# Patient Record
Sex: FEMALE | Race: WHITE | Employment: FULL TIME | URBAN - METROPOLITAN AREA
[De-identification: names, ages, dates, MRNs, and addresses within clinical notes are randomized per-mention and may not be internally consistent; named-entity substitution may affect disease eponyms.]

---

## 2017-01-09 ENCOUNTER — HOSPITAL ENCOUNTER (EMERGENCY)
Age: 47
Discharge: HOME | End: 2017-01-09
Attending: EMERGENCY MEDICINE
Payer: COMMERCIAL

## 2017-01-09 VITALS
HEART RATE: 86 BPM | OXYGEN SATURATION: 100 % | SYSTOLIC BLOOD PRESSURE: 143 MMHG | DIASTOLIC BLOOD PRESSURE: 87 MMHG | TEMPERATURE: 98 F | BODY MASS INDEX: 47.09 KG/M2 | RESPIRATION RATE: 16 BRPM | HEIGHT: 66 IN | WEIGHT: 293 LBS

## 2017-01-09 DIAGNOSIS — M43.6 ACUTE MUSCLE STIFFNESS OF NECK: Primary | ICD-10-CM

## 2017-01-09 PROCEDURE — 99283 EMERGENCY DEPT VISIT LOW MDM: CPT

## 2017-01-09 PROCEDURE — 96372 THER/PROPH/DIAG INJ SC/IM: CPT

## 2017-01-09 RX ORDER — LORAZEPAM 0.5 MG/1
0.5 TABLET ORAL EVERY 4 HOURS PRN
COMMUNITY
End: 2017-07-18

## 2017-01-09 RX ORDER — LOSARTAN POTASSIUM 25 MG/1
50 TABLET ORAL DAILY
COMMUNITY
End: 2017-07-08 | Stop reason: ALTCHOICE

## 2017-01-09 RX ORDER — AMLODIPINE BESYLATE 5 MG/1
10 TABLET ORAL DAILY
COMMUNITY
End: 2017-07-08 | Stop reason: ALTCHOICE

## 2017-01-09 RX ORDER — CYCLOBENZAPRINE HCL 10 MG
10 TABLET ORAL 3 TIMES DAILY PRN
Qty: 20 TABLET | Refills: 0 | Status: SHIPPED | OUTPATIENT
Start: 2017-01-09 | End: 2017-01-16

## 2017-01-09 RX ORDER — LORAZEPAM 2 MG/ML
1 INJECTION INTRAMUSCULAR ONCE
Status: COMPLETED | OUTPATIENT
Start: 2017-01-09 | End: 2017-01-09

## 2017-01-09 RX ORDER — ESCITALOPRAM OXALATE 20 MG/1
20 TABLET ORAL DAILY
COMMUNITY
End: 2018-02-22

## 2017-01-09 NOTE — ED PROVIDER NOTES
Patient Seen in: THE [facility] Department In [place]    History   Patient presents with:  Neck Pain (musculoskeletal, neurologic)    Stated Complaint: NECK PA IN    HPI    She is a 71-year-old female coming with neck pain worse on the left side.   Tray Davidson 01/09/17 0510 100 %   O2 Device 01/09/17 0510 None (Room air)       Current:/87 mmHg  Pulse 86  Temp(Src) 97.8 °F (36.6 °C) (Temporal)  Resp 16  Ht 167.6 cm (5' 6\")  Wt 136.079 kg  BMI 48.44 kg/m2  SpO2 100%        Physical Exam   Constitutional: Sh Medication List    START taking these medications    Cyclobenzaprine HCl 10 MG Oral Tab  Take 1 tablet (10 mg total) by mouth 3 (three) times daily as needed for Muscle spasms.   Qty: 20 tablet Refills: 0

## 2017-06-03 ENCOUNTER — HOSPITAL ENCOUNTER (OUTPATIENT)
Dept: CT IMAGING | Facility: HOSPITAL | Age: 47
Discharge: HOME | End: 2017-06-03
Attending: FAMILY MEDICINE

## 2017-06-03 DIAGNOSIS — Z13.9 SCREENING PROCEDURE: ICD-10-CM

## 2017-07-08 ENCOUNTER — OFFICE VISIT (OUTPATIENT)
Dept: FAMILY MEDICINE CLINIC | Facility: CLINIC | Age: 47
End: 2017-07-08

## 2017-07-08 VITALS
TEMPERATURE: 99 F | BODY MASS INDEX: 47.09 KG/M2 | SYSTOLIC BLOOD PRESSURE: 138 MMHG | DIASTOLIC BLOOD PRESSURE: 88 MMHG | RESPIRATION RATE: 72 BRPM | WEIGHT: 293 LBS | HEIGHT: 66 IN | HEART RATE: 72 BPM

## 2017-07-08 DIAGNOSIS — I10 ESSENTIAL HYPERTENSION: ICD-10-CM

## 2017-07-08 DIAGNOSIS — K42.9 UMBILICAL HERNIA WITHOUT OBSTRUCTION AND WITHOUT GANGRENE: Primary | ICD-10-CM

## 2017-07-08 DIAGNOSIS — F32.A ANXIETY AND DEPRESSION: ICD-10-CM

## 2017-07-08 DIAGNOSIS — F41.9 ANXIETY AND DEPRESSION: ICD-10-CM

## 2017-07-08 DIAGNOSIS — E03.9 ACQUIRED HYPOTHYROIDISM: ICD-10-CM

## 2017-07-08 PROCEDURE — 99202 OFFICE O/P NEW SF 15 MIN: CPT | Performed by: NURSE PRACTITIONER

## 2017-07-08 RX ORDER — LOSARTAN POTASSIUM 100 MG/1
1 TABLET ORAL NIGHTLY
COMMUNITY
End: 2017-11-10

## 2017-07-08 RX ORDER — CHLORAL HYDRATE 500 MG
2 CAPSULE ORAL DAILY
COMMUNITY

## 2017-07-08 RX ORDER — AMLODIPINE BESYLATE 10 MG/1
10 TABLET ORAL NIGHTLY
COMMUNITY
End: 2018-02-22

## 2017-07-08 NOTE — PROGRESS NOTES
[facility] Internal Medicine Office Note  Chief Complaint:   Patient presents with:  Establish Care: former PCP was Dr. Adry Bermudez @ [facility]/ [facility]. Hernia: brought in results of CT Scan results from 12/27/2016 @ [facility].     HP Rfl:    LEVOTHYROXINE SODIUM OR Take 100 mcg by mouth. Disp:  Rfl:    escitalopram 20 MG Oral Tab Take 20 mg by mouth daily. Disp:  Rfl:    LORazepam 0.5 MG Oral Tab Take 0.5 mg by mouth every 4 (four) hours as needed for Anxiety.  Disp:  Rfl:          REVI and no guarding. Unable to palpate umbilical hernia, difficult exam r/t to body habitus    Lymphadenopathy:     She has no cervical adenopathy. Neurological: She is alert and oriented to person, place, and time.      ASSESSMENT AND PLAN:   Yanet Sampson

## 2017-07-08 NOTE — PATIENT INSTRUCTIONS
Thank you for choosing [facility] at [place] [address]  To Do: Janiya Bradley  1. Schedule appt with Dr. Saman White for physical exam  2. Schedule appt with general surgery (for hernia eval.) and make sure to bring CT results with you.     Effectiv even beyond those discussed today.  All therapies have potential risk of harm or side effects or medication interactions.  It is your duty and for your safety to discuss with the pharmacist and our office with questions, and to notify us and stop treatment

## 2017-07-13 PROBLEM — Z90.711 HISTORY OF HYSTERECTOMY, SUPRACERVICAL: Status: ACTIVE | Noted: 2017-07-13

## 2017-07-18 ENCOUNTER — OFFICE VISIT (OUTPATIENT)
Dept: FAMILY MEDICINE CLINIC | Facility: CLINIC | Age: 47
End: 2017-07-18

## 2017-07-18 ENCOUNTER — APPOINTMENT (OUTPATIENT)
Dept: LAB | Age: 47
End: 2017-07-18
Attending: INTERNAL MEDICINE
Payer: COMMERCIAL

## 2017-07-18 VITALS
RESPIRATION RATE: 16 BRPM | BODY MASS INDEX: 47.09 KG/M2 | HEIGHT: 66 IN | DIASTOLIC BLOOD PRESSURE: 84 MMHG | WEIGHT: 293 LBS | TEMPERATURE: 98 F | SYSTOLIC BLOOD PRESSURE: 138 MMHG | HEART RATE: 72 BPM

## 2017-07-18 DIAGNOSIS — R74.8 ELEVATED LIVER ENZYMES: ICD-10-CM

## 2017-07-18 DIAGNOSIS — F32.A ANXIETY AND DEPRESSION: ICD-10-CM

## 2017-07-18 DIAGNOSIS — E03.9 HYPOTHYROIDISM, UNSPECIFIED TYPE: ICD-10-CM

## 2017-07-18 DIAGNOSIS — K42.9 UMBILICAL HERNIA WITHOUT OBSTRUCTION AND WITHOUT GANGRENE: ICD-10-CM

## 2017-07-18 DIAGNOSIS — I10 ESSENTIAL HYPERTENSION: Primary | ICD-10-CM

## 2017-07-18 DIAGNOSIS — R91.8 PULMONARY NODULES: ICD-10-CM

## 2017-07-18 DIAGNOSIS — F41.9 ANXIETY AND DEPRESSION: ICD-10-CM

## 2017-07-18 DIAGNOSIS — B37.9 YEAST INFECTION: ICD-10-CM

## 2017-07-18 LAB
ALBUMIN SERPL-MCNC: 3.6 G/DL (ref 3.5–4.8)
ALP LIVER SERPL-CCNC: 88 U/L (ref 37–98)
ALT SERPL-CCNC: 73 U/L (ref 14–54)
AST SERPL-CCNC: 49 U/L (ref 15–41)
BILIRUB DIRECT SERPL-MCNC: <0.1 MG/DL (ref 0.1–0.5)
BILIRUB SERPL-MCNC: 0.3 MG/DL (ref 0.1–2)
FREE T4: 1.1 NG/DL (ref 0.9–1.8)
M PROTEIN MFR SERPL ELPH: 7.6 G/DL (ref 6.1–8.3)
TSI SER-ACNC: 0.76 MIU/ML (ref 0.35–5.5)

## 2017-07-18 PROCEDURE — 84439 ASSAY OF FREE THYROXINE: CPT | Performed by: INTERNAL MEDICINE

## 2017-07-18 PROCEDURE — 80076 HEPATIC FUNCTION PANEL: CPT | Performed by: INTERNAL MEDICINE

## 2017-07-18 PROCEDURE — 99214 OFFICE O/P EST MOD 30 MIN: CPT | Performed by: INTERNAL MEDICINE

## 2017-07-18 PROCEDURE — 36415 COLL VENOUS BLD VENIPUNCTURE: CPT | Performed by: INTERNAL MEDICINE

## 2017-07-18 PROCEDURE — 84443 ASSAY THYROID STIM HORMONE: CPT | Performed by: INTERNAL MEDICINE

## 2017-07-18 RX ORDER — ALPRAZOLAM 0.25 MG/1
0.25 TABLET ORAL 2 TIMES DAILY PRN
Qty: 30 TABLET | Refills: 3 | Status: SHIPPED | OUTPATIENT
Start: 2017-07-18 | End: 2018-02-22

## 2017-07-18 RX ORDER — FLUCONAZOLE 150 MG/1
150 TABLET ORAL ONCE
Qty: 1 TABLET | Refills: 0 | Status: SHIPPED | OUTPATIENT
Start: 2017-07-18 | End: 2017-07-18

## 2017-07-18 NOTE — PROGRESS NOTES
[facility] Internal Medicine Office Note  Chief Complaint:   Patient presents with:  Establish Care  Hernia: has appt with Dr. Hernadez Certain  Lab: previous doctor informed her the liver enzymes were elevated - would like to discuss CT calcium score  Kevin Hysterectomy  10/2013    Ovaries Remain     Family History   Problem Relation Age of Onset   • Heart Disorder Father    • Stroke Father    • COPD [OTHER] Father    • Diabetes Mother      Diet Controlled   • Hypertension Mother    • Thyroid Disorder Mother Psychiatric/Behavioral: Positive for depressed mood. The patient is nervous/anxious. EXAM:   /84   Pulse 72   Temp 97.9 °F (36.6 °C) (Temporal)   Resp 16   Ht 66\"   Wt (!) 305 lb 12.8 oz   BMI 49.36 kg/m²  Estimated body mass index is 49. 3 escitalopram 20mg  -     OP REFERRAL TO [facility]    Umbilical hernia without obstruction and without gangrene - appt scheduled with surgeon     Hypothyroidism, unspecified type - due for thyroid check   -     TSH+FREE T4; Future    Elevated liver enzymes -

## 2017-07-27 ENCOUNTER — OFFICE VISIT (OUTPATIENT)
Dept: SURGERY | Facility: CLINIC | Age: 47
End: 2017-07-27

## 2017-07-27 VITALS
SYSTOLIC BLOOD PRESSURE: 120 MMHG | DIASTOLIC BLOOD PRESSURE: 78 MMHG | HEART RATE: 67 BPM | HEIGHT: 66 IN | WEIGHT: 293 LBS | BODY MASS INDEX: 47.09 KG/M2

## 2017-07-27 DIAGNOSIS — K42.9 UMBILICAL HERNIA WITHOUT OBSTRUCTION AND WITHOUT GANGRENE: Primary | ICD-10-CM

## 2017-07-27 DIAGNOSIS — K43.9 VENTRAL HERNIA WITHOUT OBSTRUCTION OR GANGRENE: ICD-10-CM

## 2017-07-27 PROCEDURE — 99244 OFF/OP CNSLTJ NEW/EST MOD 40: CPT | Performed by: SURGERY

## 2017-07-27 NOTE — H&P
New Patient Visit Note       Active Problems      1. Umbilical hernia without obstruction and without gangrene    2.  Ventral hernia without obstruction or gangrene        Chief Complaint   Patient presents with:  Hernia: New pt ref by Dr. Dane Beard for umbilic The past medical and past surgical history have been reviewed by me today.     Past Medical History:   Diagnosis Date   • Anxiety    • Depression    • Essential hypertension    • Hyperlipidemia    • Hypothyroidism      Past Surgical History:  10/2013: KUSUM omega-3 fatty acids 1000 MG Oral Cap Take 2 capsules by mouth daily. Disp:  Rfl:    LEVOTHYROXINE SODIUM OR Take 100 mcg by mouth. Disp:  Rfl:    escitalopram 20 MG Oral Tab Take 20 mg by mouth daily.  Disp:  Rfl:          Review of Systems  The Review of Bowel sounds are normal. She exhibits no distension. There is no tenderness. There is no rebound and no guarding. A hernia is present. Hernia confirmed positive in the ventral area. Umbilical and ventral hernia, reducible.    Musculoskeletal: Normal r

## 2017-08-28 RX ORDER — HEPARIN SODIUM 5000 [USP'U]/ML
5000 INJECTION, SOLUTION INTRAVENOUS; SUBCUTANEOUS ONCE
Status: CANCELLED | OUTPATIENT
Start: 2017-08-28

## 2017-09-01 ENCOUNTER — APPOINTMENT (OUTPATIENT)
Dept: LAB | Facility: HOSPITAL | Age: 47
End: 2017-09-01
Payer: COMMERCIAL

## 2017-09-01 DIAGNOSIS — K43.9 VENTRAL HERNIA WITHOUT OBSTRUCTION OR GANGRENE: ICD-10-CM

## 2017-09-01 LAB
ATRIAL RATE: 63 BPM
BUN BLD-MCNC: 11 MG/DL (ref 8–20)
CALCIUM BLD-MCNC: 9.2 MG/DL (ref 8.3–10.3)
CHLORIDE: 104 MMOL/L (ref 101–111)
CO2: 30 MMOL/L (ref 22–32)
CREAT BLD-MCNC: 0.81 MG/DL (ref 0.55–1.02)
GLUCOSE BLD-MCNC: 127 MG/DL (ref 70–99)
P AXIS: 42 DEGREES
P-R INTERVAL: 158 MS
POTASSIUM SERPL-SCNC: 4.1 MMOL/L (ref 3.6–5.1)
Q-T INTERVAL: 418 MS
QRS DURATION: 76 MS
QTC CALCULATION (BEZET): 427 MS
R AXIS: 4 DEGREES
SODIUM SERPL-SCNC: 140 MMOL/L (ref 136–144)
T AXIS: 27 DEGREES
VENTRICULAR RATE: 63 BPM

## 2017-09-01 PROCEDURE — 80048 BASIC METABOLIC PNL TOTAL CA: CPT

## 2017-09-01 PROCEDURE — 93010 ELECTROCARDIOGRAM REPORT: CPT | Performed by: INTERNAL MEDICINE

## 2017-09-01 PROCEDURE — 36415 COLL VENOUS BLD VENIPUNCTURE: CPT

## 2017-09-01 PROCEDURE — 93005 ELECTROCARDIOGRAM TRACING: CPT

## 2017-09-22 ENCOUNTER — SURGERY (OUTPATIENT)
Age: 47
End: 2017-09-22

## 2017-09-22 ENCOUNTER — HOSPITAL ENCOUNTER (OUTPATIENT)
Facility: HOSPITAL | Age: 47
Discharge: HOME | End: 2017-09-23
Attending: SURGERY | Admitting: SURGERY
Payer: COMMERCIAL

## 2017-09-22 ENCOUNTER — ANESTHESIA (OUTPATIENT)
Dept: SURGERY | Facility: HOSPITAL | Age: 47
End: 2017-09-22
Payer: COMMERCIAL

## 2017-09-22 ENCOUNTER — ANESTHESIA EVENT (OUTPATIENT)
Dept: SURGERY | Facility: HOSPITAL | Age: 47
End: 2017-09-22
Payer: COMMERCIAL

## 2017-09-22 DIAGNOSIS — K43.9 VENTRAL HERNIA WITHOUT OBSTRUCTION OR GANGRENE: Primary | ICD-10-CM

## 2017-09-22 PROCEDURE — 0WUF0JZ SUPPLEMENT ABDOMINAL WALL WITH SYNTHETIC SUBSTITUTE, OPEN APPROACH: ICD-10-PCS | Performed by: SURGERY

## 2017-09-22 DEVICE — VENTRALEX ST HERNIA PATCH
Type: IMPLANTABLE DEVICE | Site: ABDOMEN | Status: FUNCTIONAL
Brand: VENTRALEX ST HERNIA PATCH

## 2017-09-22 DEVICE — VENTRIO ST HERNIA PATCH
Type: IMPLANTABLE DEVICE | Site: ABDOMEN | Status: FUNCTIONAL
Brand: VENTRIO ST HERNIA PATCH

## 2017-09-22 RX ORDER — ONDANSETRON 2 MG/ML
4 INJECTION INTRAMUSCULAR; INTRAVENOUS EVERY 6 HOURS PRN
Status: DISCONTINUED | OUTPATIENT
Start: 2017-09-22 | End: 2017-09-23

## 2017-09-22 RX ORDER — NALOXONE HYDROCHLORIDE 0.4 MG/ML
80 INJECTION, SOLUTION INTRAMUSCULAR; INTRAVENOUS; SUBCUTANEOUS AS NEEDED
Status: DISCONTINUED | OUTPATIENT
Start: 2017-09-22 | End: 2017-09-22 | Stop reason: HOSPADM

## 2017-09-22 RX ORDER — ALPRAZOLAM 0.25 MG/1
0.25 TABLET ORAL 2 TIMES DAILY PRN
Status: DISCONTINUED | OUTPATIENT
Start: 2017-09-22 | End: 2017-09-23

## 2017-09-22 RX ORDER — HYDROMORPHONE HYDROCHLORIDE 1 MG/ML
INJECTION, SOLUTION INTRAMUSCULAR; INTRAVENOUS; SUBCUTANEOUS
Status: COMPLETED
Start: 2017-09-22 | End: 2017-09-22

## 2017-09-22 RX ORDER — HYDROCODONE BITARTRATE AND ACETAMINOPHEN 5; 325 MG/1; MG/1
1 TABLET ORAL ONCE
Status: COMPLETED | OUTPATIENT
Start: 2017-09-22 | End: 2017-09-22

## 2017-09-22 RX ORDER — AMLODIPINE BESYLATE 5 MG/1
10 TABLET ORAL NIGHTLY
Status: DISCONTINUED | OUTPATIENT
Start: 2017-09-22 | End: 2017-09-23

## 2017-09-22 RX ORDER — HYDROMORPHONE HYDROCHLORIDE 1 MG/ML
1.2 INJECTION, SOLUTION INTRAMUSCULAR; INTRAVENOUS; SUBCUTANEOUS EVERY 2 HOUR PRN
Status: DISCONTINUED | OUTPATIENT
Start: 2017-09-22 | End: 2017-09-23

## 2017-09-22 RX ORDER — HEPARIN SODIUM 5000 [USP'U]/ML
5000 INJECTION, SOLUTION INTRAVENOUS; SUBCUTANEOUS ONCE
Status: COMPLETED | OUTPATIENT
Start: 2017-09-22 | End: 2017-09-22

## 2017-09-22 RX ORDER — HYDROMORPHONE HYDROCHLORIDE 1 MG/ML
0.8 INJECTION, SOLUTION INTRAMUSCULAR; INTRAVENOUS; SUBCUTANEOUS EVERY 2 HOUR PRN
Status: DISCONTINUED | OUTPATIENT
Start: 2017-09-22 | End: 2017-09-23

## 2017-09-22 RX ORDER — BACITRACIN 50000 [USP'U]/1
INJECTION, POWDER, LYOPHILIZED, FOR SOLUTION INTRAMUSCULAR AS NEEDED
Status: DISCONTINUED | OUTPATIENT
Start: 2017-09-22 | End: 2017-09-22 | Stop reason: HOSPADM

## 2017-09-22 RX ORDER — IBUPROFEN 400 MG/1
400 TABLET ORAL EVERY 4 HOURS PRN
Status: DISCONTINUED | OUTPATIENT
Start: 2017-09-22 | End: 2017-09-23

## 2017-09-22 RX ORDER — IBUPROFEN 600 MG/1
600 TABLET ORAL EVERY 4 HOURS PRN
Status: DISCONTINUED | OUTPATIENT
Start: 2017-09-22 | End: 2017-09-23

## 2017-09-22 RX ORDER — SODIUM CHLORIDE, SODIUM LACTATE, POTASSIUM CHLORIDE, CALCIUM CHLORIDE 600; 310; 30; 20 MG/100ML; MG/100ML; MG/100ML; MG/100ML
INJECTION, SOLUTION INTRAVENOUS CONTINUOUS
Status: DISCONTINUED | OUTPATIENT
Start: 2017-09-22 | End: 2017-09-22

## 2017-09-22 RX ORDER — HYDROCODONE BITARTRATE AND ACETAMINOPHEN 5; 325 MG/1; MG/1
1 TABLET ORAL EVERY 4 HOURS PRN
Status: DISCONTINUED | OUTPATIENT
Start: 2017-09-22 | End: 2017-09-23

## 2017-09-22 RX ORDER — AMLODIPINE BESYLATE 5 MG/1
10 TABLET ORAL DAILY
Status: DISCONTINUED | OUTPATIENT
Start: 2017-09-22 | End: 2017-09-22

## 2017-09-22 RX ORDER — HYDROCODONE BITARTRATE AND ACETAMINOPHEN 5; 325 MG/1; MG/1
2 TABLET ORAL EVERY 4 HOURS PRN
Status: DISCONTINUED | OUTPATIENT
Start: 2017-09-22 | End: 2017-09-23

## 2017-09-22 RX ORDER — LOSARTAN POTASSIUM 100 MG/1
100 TABLET ORAL DAILY
Status: DISCONTINUED | OUTPATIENT
Start: 2017-09-22 | End: 2017-09-22

## 2017-09-22 RX ORDER — ESCITALOPRAM OXALATE 20 MG/1
20 TABLET ORAL DAILY
Status: DISCONTINUED | OUTPATIENT
Start: 2017-09-23 | End: 2017-09-23

## 2017-09-22 RX ORDER — METOCLOPRAMIDE HYDROCHLORIDE 5 MG/ML
10 INJECTION INTRAMUSCULAR; INTRAVENOUS EVERY 6 HOURS PRN
Status: DISCONTINUED | OUTPATIENT
Start: 2017-09-22 | End: 2017-09-23

## 2017-09-22 RX ORDER — HYDROCODONE BITARTRATE AND ACETAMINOPHEN 5; 325 MG/1; MG/1
TABLET ORAL
Qty: 30 TABLET | Refills: 0 | Status: SHIPPED | OUTPATIENT
Start: 2017-09-22 | End: 2017-10-05 | Stop reason: ALTCHOICE

## 2017-09-22 RX ORDER — IBUPROFEN 200 MG
200 TABLET ORAL EVERY 4 HOURS PRN
Status: DISCONTINUED | OUTPATIENT
Start: 2017-09-22 | End: 2017-09-23

## 2017-09-22 RX ORDER — HYDROMORPHONE HYDROCHLORIDE 1 MG/ML
0.4 INJECTION, SOLUTION INTRAMUSCULAR; INTRAVENOUS; SUBCUTANEOUS EVERY 2 HOUR PRN
Status: DISCONTINUED | OUTPATIENT
Start: 2017-09-22 | End: 2017-09-23

## 2017-09-22 RX ORDER — HYDROCODONE BITARTRATE AND ACETAMINOPHEN 5; 325 MG/1; MG/1
2 TABLET ORAL ONCE
Status: COMPLETED | OUTPATIENT
Start: 2017-09-22 | End: 2017-09-22

## 2017-09-22 RX ORDER — MAGNESIUM HYDROXIDE 1200 MG/15ML
LIQUID ORAL CONTINUOUS PRN
Status: DISCONTINUED | OUTPATIENT
Start: 2017-09-22 | End: 2017-09-22

## 2017-09-22 RX ORDER — SODIUM CHLORIDE, SODIUM LACTATE, POTASSIUM CHLORIDE, CALCIUM CHLORIDE 600; 310; 30; 20 MG/100ML; MG/100ML; MG/100ML; MG/100ML
INJECTION, SOLUTION INTRAVENOUS CONTINUOUS
Status: DISCONTINUED | OUTPATIENT
Start: 2017-09-22 | End: 2017-09-23

## 2017-09-22 RX ORDER — CEFADROXIL 500 MG/1
500 CAPSULE ORAL 2 TIMES DAILY
Qty: 20 CAPSULE | Refills: 0 | Status: SHIPPED | OUTPATIENT
Start: 2017-09-22 | End: 2017-10-02

## 2017-09-22 RX ORDER — LEVOTHYROXINE SODIUM 0.1 MG/1
100 TABLET ORAL
Status: DISCONTINUED | OUTPATIENT
Start: 2017-09-22 | End: 2017-09-23

## 2017-09-22 RX ORDER — LOSARTAN POTASSIUM 100 MG/1
100 TABLET ORAL NIGHTLY
Status: DISCONTINUED | OUTPATIENT
Start: 2017-09-22 | End: 2017-09-23

## 2017-09-22 RX ORDER — BUPIVACAINE HYDROCHLORIDE AND EPINEPHRINE 5; 5 MG/ML; UG/ML
INJECTION, SOLUTION EPIDURAL; INTRACAUDAL; PERINEURAL AS NEEDED
Status: DISCONTINUED | OUTPATIENT
Start: 2017-09-22 | End: 2017-09-22 | Stop reason: HOSPADM

## 2017-09-22 RX ORDER — HYDROMORPHONE HYDROCHLORIDE 1 MG/ML
0.4 INJECTION, SOLUTION INTRAMUSCULAR; INTRAVENOUS; SUBCUTANEOUS EVERY 5 MIN PRN
Status: DISCONTINUED | OUTPATIENT
Start: 2017-09-22 | End: 2017-09-22 | Stop reason: HOSPADM

## 2017-09-22 NOTE — H&P
History & Physical Examination    Patient Name: Damián Holden  MRN: [id number]  CSN: [id number]  YOB: 1970    Diagnosis: Ventral incisional hernia ×2    Present Illness:  The patient is a pleasant 43-year-old female who presents in consult Allergies: No Known Allergies    Past Medical History:   Diagnosis Date   • Anxiety    • Depression    • Essential hypertension    • Hyperlipidemia    • Hypothyroidism      Past Surgical History:  No date: CARPAL TUNNEL RELEASE Bilateral  No date: CO

## 2017-09-22 NOTE — OR NURSING
3319-Patient received from PACU.   Transferred to recChanning Homer  Pt unable to keep O2 Sat >92%-O2 applied  1700-O2 removed to test if patient could keep O2>92% and she could not  Anesthesia notified and he stated that patient would perhaps be admitted because of

## 2017-09-22 NOTE — ANESTHESIA PREPROCEDURE EVALUATION
PRE-OP EVALUATION    Patient Name: Maryam Foil    Pre-op Diagnosis: Ventral hernia without obstruction or gangrene [K43.9]    Procedure(s):  VENTRAL HERNIA REPAIR WITH MESH-COMPLEX X2 ,MODERATE X2      Surgeon(s) and Role:     Fabio Camacho, 140 09/01/2017   K 4.1 09/01/2017    09/01/2017   CO2 30.0 09/01/2017   BUN 11 09/01/2017   CREATSERUM 0.81 09/01/2017    (H) 09/01/2017   CA 9.2 09/01/2017            Airway      Mallampati: II  Mouth opening: 3 FB  TM distance: 4 - 6 cm  Nec

## 2017-09-22 NOTE — ANESTHESIA POSTPROCEDURE EVALUATION
[address] Patient Status:  Hospital Outpatient Surgery   Age/Gender 56year old female MRN [id number]   [facility] SURGERY Attending Alvarado Berumen MD   [facility] Day # 0 PCP Jonnathan Bonner MD       Anesthesia Post-

## 2017-09-22 NOTE — OPERATIVE REPORT
PREOPERATIVE DIAGNOSIS:  Incisional ventral hernia x 2.    POSTOPERATIVE DIAGNOSIS:  Incisional ventral hernia x 2.    PROCEDURE PERFORMED: Repair of incarcerated ventral hernia with mesh x 2  Symone Webster MD  ASSISTANT:  Bebo Paz fascial edges were grasped. Subcutaneous flaps were created. The hernia contents were reduced back into the abdomen. This hernia defect measured approximately 3\" x 1.5\".   A Ventrio mesh measuring approximately 4\" x 3\" was passed on the field.  It wa

## 2017-09-23 VITALS
DIASTOLIC BLOOD PRESSURE: 69 MMHG | RESPIRATION RATE: 20 BRPM | HEART RATE: 60 BPM | OXYGEN SATURATION: 99 % | HEIGHT: 66 IN | TEMPERATURE: 98 F | BODY MASS INDEX: 46.12 KG/M2 | WEIGHT: 287 LBS | SYSTOLIC BLOOD PRESSURE: 115 MMHG

## 2017-09-23 NOTE — PROGRESS NOTES
NURSING DISCHARGE NOTE    Discharged Home via Wheelchair. Accompanied by Family member and Spouse  Belongings Taken by patient/family. PATIENT DISCHARGED HOME IN STABLE CONDITION.  PATIENT LEFT FLOOR PER WHEELCHAIR AND WAS ACCOMPANIED BY  AND

## 2017-09-23 NOTE — PLAN OF CARE
DISCHARGE PLANNING    • Discharge to home or other facility with appropriate resources Completed        GASTROINTESTINAL - ADULT    • Minimal or absence of nausea and vomiting Completed    • Maintains or returns to baseline bowel function Completed    • Ma

## 2017-09-23 NOTE — PROGRESS NOTES
[facility]  Progress Note    Scott Barron Patient Status:  Outpatient in a Bed    1970 MRN [id number]   [facility] 3NW-A Attending Angel Lopez MD   [facility] Day # 0 PCP Nicole Mcgarry MD     Subjective:  Pt feeling good.

## 2017-09-28 ENCOUNTER — OFFICE VISIT (OUTPATIENT)
Dept: SURGERY | Facility: CLINIC | Age: 47
End: 2017-09-28

## 2017-09-28 VITALS
HEIGHT: 66 IN | WEIGHT: 293 LBS | HEART RATE: 61 BPM | DIASTOLIC BLOOD PRESSURE: 69 MMHG | TEMPERATURE: 98 F | SYSTOLIC BLOOD PRESSURE: 109 MMHG | BODY MASS INDEX: 47.09 KG/M2

## 2017-09-28 DIAGNOSIS — K43.9 VENTRAL HERNIA WITHOUT OBSTRUCTION OR GANGRENE: Primary | ICD-10-CM

## 2017-09-28 PROCEDURE — 99024 POSTOP FOLLOW-UP VISIT: CPT | Performed by: PHYSICIAN ASSISTANT

## 2017-09-28 RX ORDER — FLUCONAZOLE 200 MG/1
200 TABLET ORAL DAILY
Qty: 3 TABLET | Refills: 0 | Status: SHIPPED | OUTPATIENT
Start: 2017-09-28 | End: 2017-10-01

## 2017-09-28 NOTE — PROGRESS NOTES
Post Operative Visit Note       Active Problems  1.  Ventral hernia without obstruction or gangrene         Chief Complaint   Patient presents with:  Post-Op: 9/22 Repair of incarcerated ventral hernia with mesh x 2, off Norco, WILIAN Hunter-P drain date: TONSILLECTOMY    The family history and social history have been reviewed by me today.     Family History   Problem Relation Age of Onset   • Heart Disorder Father    • Stroke Father    • COPD [OTHER] Father    • Diabetes Mother      Diet Controlled nightly. Disp:  Rfl:    LEVOTHYROXINE SODIUM OR Take 100 mcg by mouth. Disp:  Rfl:    escitalopram 20 MG Oral Tab Take 20 mg by mouth daily.  Disp:  Rfl:    HYDROcodone-acetaminophen (NORCO) 5-325 MG Oral Tab 1-2 tablets by mouth every 4-6 hours as needed tenderness. There is no rebound and no guarding. No hernia. The patient's abdomen is soft, nontender, nondistended, with normoactive bowel sounds. Her midline incision is clean, dry, intact, without erythema or drainage.   Her surgical staples are in [place], [place]

## 2017-10-05 ENCOUNTER — OFFICE VISIT (OUTPATIENT)
Dept: SURGERY | Facility: CLINIC | Age: 47
End: 2017-10-05

## 2017-10-05 VITALS
RESPIRATION RATE: 18 BRPM | DIASTOLIC BLOOD PRESSURE: 77 MMHG | BODY MASS INDEX: 47.09 KG/M2 | HEIGHT: 66 IN | WEIGHT: 293 LBS | SYSTOLIC BLOOD PRESSURE: 118 MMHG | TEMPERATURE: 98 F | HEART RATE: 67 BPM

## 2017-10-05 DIAGNOSIS — K43.9 VENTRAL HERNIA WITHOUT OBSTRUCTION OR GANGRENE: Primary | ICD-10-CM

## 2017-10-05 PROCEDURE — 99024 POSTOP FOLLOW-UP VISIT: CPT | Performed by: PHYSICIAN ASSISTANT

## 2017-10-05 NOTE — PROGRESS NOTES
Post Operative Visit Note       Active Problems  1. Ventral hernia without obstruction or gangrene         Chief Complaint   Patient presents with:  Post-Op: 2nd p/o hernia repair- need staples removed.  PT thinks that she still has a yeast infection but de • Hypertension Mother    • Thyroid Disorder Mother    • Cancer Mother      Thyroid & Skin   • Cancer Maternal Grandmother      Liver   • Heart Disorder Maternal Grandfather    • Stroke Maternal Grandfather    • Diabetes Paternal Grandmother    • Heart At loss, nosebleeds, sore throat and trouble swallowing. Respiratory: Negative for apnea, cough, shortness of breath and wheezing. Cardiovascular: Negative for chest pain, palpitations and leg swelling.    Gastrointestinal: Negative for abdominal distent sterilely without complication. She tolerated this well. No further bandages or dressings were required. I discussed with the patient she may continue to shower like normal allowing soap and water to run over the incision.   She should avoid soaking in a

## 2017-11-10 RX ORDER — LOSARTAN POTASSIUM 100 MG/1
100 TABLET ORAL NIGHTLY
Qty: 90 TABLET | Refills: 1 | Status: SHIPPED | OUTPATIENT
Start: 2017-11-10 | End: 2018-02-22

## 2017-11-10 NOTE — TELEPHONE ENCOUNTER
Requesting Losartan 100mg daily  LOV: 7/18/17  RTC: 6 months  Last Relevant Labs: 9/1/17  Filled: last filled by old PCP    No future appointments. Passed protocol - med approved.

## 2018-01-11 ENCOUNTER — HOSPITAL ENCOUNTER (OUTPATIENT)
Dept: OTHER | Facility: HOSPITAL | Age: 48
Setting detail: THERAPIES SERIES
Discharge: HOME | End: 2018-01-11
Attending: INTERNAL MEDICINE

## 2018-01-11 VITALS
BODY MASS INDEX: 47.09 KG/M2 | DIASTOLIC BLOOD PRESSURE: 80 MMHG | WEIGHT: 293 LBS | SYSTOLIC BLOOD PRESSURE: 114 MMHG | HEIGHT: 66 IN | HEART RATE: 72 BPM

## 2018-01-11 NOTE — PROGRESS NOTES
Patient seen for Phase 1 Lifestyle Under Construction this am. Start date is 02/06/18. Her initial measurements were done and documented on flow sheet. Fasting Cholesterol and Blood sugar complete.  Patient has seen Alanis Cole the dietician for initial evaluation

## 2018-01-16 NOTE — ADDENDUM NOTE
Encounter addended by: Danie Salas on: 1/16/2018 11:56 AM<BR>    Actions taken: Charge Capture section accepted

## 2018-02-06 ENCOUNTER — HOSPITAL ENCOUNTER (OUTPATIENT)
Dept: OTHER | Facility: HOSPITAL | Age: 48
Setting detail: THERAPIES SERIES
Discharge: HOME | End: 2018-02-06
Attending: INTERNAL MEDICINE

## 2018-02-13 ENCOUNTER — HOSPITAL ENCOUNTER (OUTPATIENT)
Dept: OTHER | Facility: HOSPITAL | Age: 48
Setting detail: THERAPIES SERIES
Discharge: HOME | End: 2018-02-13
Attending: INTERNAL MEDICINE

## 2018-02-15 VITALS
WEIGHT: 293 LBS | HEART RATE: 82 BPM | DIASTOLIC BLOOD PRESSURE: 80 MMHG | SYSTOLIC BLOOD PRESSURE: 126 MMHG | BODY MASS INDEX: 49 KG/M2

## 2018-02-20 ENCOUNTER — HOSPITAL ENCOUNTER (OUTPATIENT)
Dept: OTHER | Facility: HOSPITAL | Age: 48
Setting detail: THERAPIES SERIES
Discharge: HOME | End: 2018-02-20
Attending: INTERNAL MEDICINE

## 2018-02-22 ENCOUNTER — OFFICE VISIT (OUTPATIENT)
Dept: INTERNAL MEDICINE CLINIC | Facility: CLINIC | Age: 48
End: 2018-02-22

## 2018-02-22 ENCOUNTER — LAB ENCOUNTER (OUTPATIENT)
Dept: LAB | Age: 48
End: 2018-02-22
Attending: INTERNAL MEDICINE
Payer: COMMERCIAL

## 2018-02-22 VITALS
BODY MASS INDEX: 47.09 KG/M2 | HEIGHT: 66 IN | DIASTOLIC BLOOD PRESSURE: 78 MMHG | WEIGHT: 293 LBS | RESPIRATION RATE: 16 BRPM | SYSTOLIC BLOOD PRESSURE: 128 MMHG | HEART RATE: 78 BPM

## 2018-02-22 DIAGNOSIS — E03.9 ACQUIRED HYPOTHYROIDISM: ICD-10-CM

## 2018-02-22 DIAGNOSIS — Z00.00 WELLNESS EXAMINATION: Primary | ICD-10-CM

## 2018-02-22 DIAGNOSIS — F32.A ANXIETY AND DEPRESSION: ICD-10-CM

## 2018-02-22 DIAGNOSIS — Z00.00 LABORATORY EXAM ORDERED AS PART OF ROUTINE GENERAL MEDICAL EXAMINATION: ICD-10-CM

## 2018-02-22 DIAGNOSIS — F41.9 ANXIETY AND DEPRESSION: ICD-10-CM

## 2018-02-22 DIAGNOSIS — I10 ESSENTIAL HYPERTENSION: ICD-10-CM

## 2018-02-22 LAB
ALBUMIN SERPL-MCNC: 4 G/DL (ref 3.5–4.8)
ALP LIVER SERPL-CCNC: 81 U/L (ref 39–100)
ALT SERPL-CCNC: 82 U/L (ref 14–54)
AST SERPL-CCNC: 61 U/L (ref 15–41)
BASOPHILS # BLD AUTO: 0.05 X10(3) UL (ref 0–0.1)
BASOPHILS NFR BLD AUTO: 0.5 %
BILIRUB SERPL-MCNC: 0.5 MG/DL (ref 0.1–2)
BUN BLD-MCNC: 12 MG/DL (ref 8–20)
CALCIUM BLD-MCNC: 9.4 MG/DL (ref 8.3–10.3)
CHLORIDE: 105 MMOL/L (ref 101–111)
CHOLEST SMN-MCNC: 177 MG/DL (ref ?–200)
CO2: 26 MMOL/L (ref 22–32)
CREAT BLD-MCNC: 0.75 MG/DL (ref 0.55–1.02)
EOSINOPHIL # BLD AUTO: 0.15 X10(3) UL (ref 0–0.3)
EOSINOPHIL NFR BLD AUTO: 1.6 %
ERYTHROCYTE [DISTWIDTH] IN BLOOD BY AUTOMATED COUNT: 13 % (ref 11.5–16)
FREE T4: 1.3 NG/DL (ref 0.9–1.8)
GLUCOSE BLD-MCNC: 95 MG/DL (ref 70–99)
HCT VFR BLD AUTO: 45.8 % (ref 34–50)
HDLC SERPL-MCNC: 37 MG/DL (ref 45–?)
HDLC SERPL: 4.78 {RATIO} (ref ?–4.44)
HGB BLD-MCNC: 15 G/DL (ref 12–16)
IMMATURE GRANULOCYTE COUNT: 0.02 X10(3) UL (ref 0–1)
IMMATURE GRANULOCYTE RATIO %: 0.2 %
LDLC SERPL CALC-MCNC: 109 MG/DL (ref ?–130)
LYMPHOCYTES # BLD AUTO: 3.04 X10(3) UL (ref 0.9–4)
LYMPHOCYTES NFR BLD AUTO: 33.1 %
M PROTEIN MFR SERPL ELPH: 8.1 G/DL (ref 6.1–8.3)
MCH RBC QN AUTO: 30.9 PG (ref 27–33.2)
MCHC RBC AUTO-ENTMCNC: 32.8 G/DL (ref 31–37)
MCV RBC AUTO: 94.4 FL (ref 81–100)
MONOCYTES # BLD AUTO: 0.68 X10(3) UL (ref 0.1–1)
MONOCYTES NFR BLD AUTO: 7.4 %
NEUTROPHIL ABS PRELIM: 5.25 X10 (3) UL (ref 1.3–6.7)
NEUTROPHILS # BLD AUTO: 5.25 X10(3) UL (ref 1.3–6.7)
NEUTROPHILS NFR BLD AUTO: 57.2 %
NONHDLC SERPL-MCNC: 140 MG/DL (ref ?–130)
PLATELET # BLD AUTO: 276 10(3)UL (ref 150–450)
POTASSIUM SERPL-SCNC: 4.3 MMOL/L (ref 3.6–5.1)
RBC # BLD AUTO: 4.85 X10(6)UL (ref 3.8–5.1)
RED CELL DISTRIBUTION WIDTH-SD: 44.9 FL (ref 35.1–46.3)
SODIUM SERPL-SCNC: 139 MMOL/L (ref 136–144)
TRIGL SERPL-MCNC: 154 MG/DL (ref ?–150)
TSI SER-ACNC: 0.76 MIU/ML (ref 0.35–5.5)
VLDLC SERPL CALC-MCNC: 31 MG/DL (ref 5–40)
WBC # BLD AUTO: 9.2 X10(3) UL (ref 4–13)

## 2018-02-22 PROCEDURE — 90471 IMMUNIZATION ADMIN: CPT | Performed by: INTERNAL MEDICINE

## 2018-02-22 PROCEDURE — 36415 COLL VENOUS BLD VENIPUNCTURE: CPT | Performed by: INTERNAL MEDICINE

## 2018-02-22 PROCEDURE — 80050 GENERAL HEALTH PANEL: CPT | Performed by: INTERNAL MEDICINE

## 2018-02-22 PROCEDURE — 80061 LIPID PANEL: CPT | Performed by: INTERNAL MEDICINE

## 2018-02-22 PROCEDURE — 90715 TDAP VACCINE 7 YRS/> IM: CPT | Performed by: INTERNAL MEDICINE

## 2018-02-22 PROCEDURE — 84439 ASSAY OF FREE THYROXINE: CPT | Performed by: INTERNAL MEDICINE

## 2018-02-22 PROCEDURE — 99396 PREV VISIT EST AGE 40-64: CPT | Performed by: INTERNAL MEDICINE

## 2018-02-22 RX ORDER — ALPRAZOLAM 0.25 MG/1
0.25 TABLET ORAL 2 TIMES DAILY PRN
Qty: 30 TABLET | Refills: 5 | Status: SHIPPED | OUTPATIENT
Start: 2018-02-22 | End: 2019-09-17

## 2018-02-22 RX ORDER — LEVOTHYROXINE SODIUM 0.1 MG/1
100 TABLET ORAL
Qty: 90 TABLET | Refills: 3 | Status: SHIPPED | OUTPATIENT
Start: 2018-02-22 | End: 2019-02-25

## 2018-02-22 RX ORDER — LEVOTHYROXINE SODIUM 0.1 MG/1
100 TABLET ORAL
Refills: 2 | COMMUNITY
Start: 2018-02-03 | End: 2018-02-22

## 2018-02-22 RX ORDER — ESCITALOPRAM OXALATE 20 MG/1
20 TABLET ORAL DAILY
Qty: 90 TABLET | Refills: 3 | Status: SHIPPED | OUTPATIENT
Start: 2018-02-22 | End: 2019-02-25

## 2018-02-22 RX ORDER — LOSARTAN POTASSIUM 100 MG/1
100 TABLET ORAL NIGHTLY
Qty: 90 TABLET | Refills: 3 | Status: SHIPPED | OUTPATIENT
Start: 2018-02-22 | End: 2019-02-25

## 2018-02-22 RX ORDER — AMLODIPINE BESYLATE 10 MG/1
10 TABLET ORAL NIGHTLY
Qty: 90 TABLET | Refills: 3 | Status: SHIPPED | OUTPATIENT
Start: 2018-02-22 | End: 2019-03-09

## 2018-02-22 NOTE — PROGRESS NOTES
[facility] Internal Medicine Office Note  Chief Complaint:   Patient presents with:  Physical      HPI:   This is a 56year old female coming in for physical  HPI  HTN; blood pressure has been well controlled    She enrolled in a wellness program shows  Smoking status: Never Smoker                                                              Smokeless tobacco: Never Used                      Alcohol use:  Yes              Comment: RARE    Allergies:  No Known Allergies    Current Outpatient Prescrip posterior oropharyngeal erythema. Eyes: Conjunctivae are normal.   Neck: Neck supple. Cardiovascular: Normal rate, regular rhythm and normal heart sounds. Pulmonary/Chest: Effort normal and breath sounds normal.   Abdominal: Soft.  There is no tender [E]      Comp Metabolic Panel (14) [E]      Lipid Panel [E]      TSH and Free T4 [E]      TdaP (Adacel, Boostrix) (35787) (DX V06.1/Z23)    Meds & Refills for this Visit:  Signed Prescriptions Disp Refills    Levothyroxine Sodium 100 MCG Oral Tab 90 tablet

## 2018-02-26 ENCOUNTER — TELEPHONE (OUTPATIENT)
Dept: INTERNAL MEDICINE CLINIC | Facility: CLINIC | Age: 48
End: 2018-02-26

## 2018-02-26 DIAGNOSIS — R74.8 ELEVATED LIVER ENZYMES: Primary | ICD-10-CM

## 2018-02-26 NOTE — TELEPHONE ENCOUNTER
Pt stated that she was to hold off on sending in refill on levothyroxine, losartan and amlodipine until lab results came back. Would like to know if she can send in refills. Please advise. TY!

## 2018-02-27 ENCOUNTER — HOSPITAL ENCOUNTER (OUTPATIENT)
Dept: OTHER | Facility: HOSPITAL | Age: 48
Setting detail: THERAPIES SERIES
Discharge: HOME | End: 2018-02-27
Attending: INTERNAL MEDICINE

## 2018-02-27 ENCOUNTER — APPOINTMENT (OUTPATIENT)
Dept: LAB | Facility: HOSPITAL | Age: 48
End: 2018-02-27
Attending: INTERNAL MEDICINE
Payer: COMMERCIAL

## 2018-02-27 DIAGNOSIS — R74.8 ELEVATED LIVER ENZYMES: ICD-10-CM

## 2018-02-27 LAB
HBV SURFACE AB SER QL: NONREACTIVE
HBV SURFACE AB SERPL IA-ACNC: 3.41 MIU/ML
HBV SURFACE AG SERPL QL IA: NONREACTIVE
HEPATITIS B SURFACE ANTIGEN INDEX: <0.1
HEPATITIS C VIRUS AB INTERPRETATION: NONREACTIVE
TRANSFERRIN: 212 MG/DL (ref 200–360)

## 2018-02-27 PROCEDURE — 87340 HEPATITIS B SURFACE AG IA: CPT

## 2018-02-27 PROCEDURE — 84466 ASSAY OF TRANSFERRIN: CPT

## 2018-02-27 PROCEDURE — 36415 COLL VENOUS BLD VENIPUNCTURE: CPT

## 2018-02-27 PROCEDURE — 86803 HEPATITIS C AB TEST: CPT

## 2018-02-27 PROCEDURE — 86706 HEP B SURFACE ANTIBODY: CPT

## 2018-02-27 NOTE — TELEPHONE ENCOUNTER
Results were discussed with her already per result note.   Ok to refill levothyroxine and other meds

## 2018-03-01 VITALS
DIASTOLIC BLOOD PRESSURE: 70 MMHG | BODY MASS INDEX: 49 KG/M2 | WEIGHT: 293 LBS | SYSTOLIC BLOOD PRESSURE: 128 MMHG | HEART RATE: 77 BPM

## 2018-03-01 VITALS
SYSTOLIC BLOOD PRESSURE: 122 MMHG | WEIGHT: 293 LBS | DIASTOLIC BLOOD PRESSURE: 85 MMHG | DIASTOLIC BLOOD PRESSURE: 80 MMHG | SYSTOLIC BLOOD PRESSURE: 120 MMHG | HEART RATE: 78 BPM | HEART RATE: 82 BPM | BODY MASS INDEX: 48 KG/M2

## 2018-03-06 ENCOUNTER — HOSPITAL ENCOUNTER (OUTPATIENT)
Dept: OTHER | Facility: HOSPITAL | Age: 48
Setting detail: THERAPIES SERIES
Discharge: HOME | End: 2018-03-06
Attending: INTERNAL MEDICINE

## 2018-03-08 VITALS
SYSTOLIC BLOOD PRESSURE: 128 MMHG | HEART RATE: 84 BPM | DIASTOLIC BLOOD PRESSURE: 74 MMHG | BODY MASS INDEX: 48 KG/M2 | WEIGHT: 293 LBS

## 2018-03-13 ENCOUNTER — HOSPITAL ENCOUNTER (OUTPATIENT)
Dept: OTHER | Facility: HOSPITAL | Age: 48
Setting detail: THERAPIES SERIES
Discharge: HOME | End: 2018-03-13
Attending: INTERNAL MEDICINE

## 2018-03-20 ENCOUNTER — HOSPITAL ENCOUNTER (OUTPATIENT)
Dept: OTHER | Facility: HOSPITAL | Age: 48
Setting detail: THERAPIES SERIES
Discharge: HOME | End: 2018-03-20
Attending: INTERNAL MEDICINE

## 2018-03-22 VITALS — WEIGHT: 292.5 LBS | BODY MASS INDEX: 47 KG/M2

## 2018-03-22 VITALS — BODY MASS INDEX: 48 KG/M2 | WEIGHT: 293 LBS

## 2018-03-27 ENCOUNTER — HOSPITAL ENCOUNTER (OUTPATIENT)
Dept: OTHER | Facility: HOSPITAL | Age: 48
Setting detail: THERAPIES SERIES
Discharge: HOME | End: 2018-03-27
Attending: INTERNAL MEDICINE

## 2018-03-29 VITALS
DIASTOLIC BLOOD PRESSURE: 70 MMHG | HEART RATE: 72 BPM | BODY MASS INDEX: 47 KG/M2 | SYSTOLIC BLOOD PRESSURE: 122 MMHG | WEIGHT: 292.13 LBS

## 2018-04-03 ENCOUNTER — HOSPITAL ENCOUNTER (OUTPATIENT)
Dept: OTHER | Facility: HOSPITAL | Age: 48
Setting detail: THERAPIES SERIES
Discharge: HOME | End: 2018-04-03
Attending: INTERNAL MEDICINE

## 2018-04-07 VITALS
HEART RATE: 74 BPM | WEIGHT: 288.38 LBS | BODY MASS INDEX: 47 KG/M2 | DIASTOLIC BLOOD PRESSURE: 70 MMHG | SYSTOLIC BLOOD PRESSURE: 120 MMHG

## 2018-04-10 ENCOUNTER — HOSPITAL ENCOUNTER (OUTPATIENT)
Dept: OTHER | Facility: HOSPITAL | Age: 48
Setting detail: THERAPIES SERIES
Discharge: HOME | End: 2018-04-10
Attending: INTERNAL MEDICINE

## 2018-04-12 VITALS
DIASTOLIC BLOOD PRESSURE: 76 MMHG | SYSTOLIC BLOOD PRESSURE: 126 MMHG | HEART RATE: 73 BPM | BODY MASS INDEX: 46 KG/M2 | WEIGHT: 287 LBS

## 2018-04-17 ENCOUNTER — HOSPITAL ENCOUNTER (OUTPATIENT)
Dept: OTHER | Facility: HOSPITAL | Age: 48
Setting detail: THERAPIES SERIES
Discharge: HOME | End: 2018-04-17
Attending: INTERNAL MEDICINE

## 2018-04-19 VITALS
DIASTOLIC BLOOD PRESSURE: 80 MMHG | HEART RATE: 63 BPM | SYSTOLIC BLOOD PRESSURE: 120 MMHG | BODY MASS INDEX: 47 KG/M2 | WEIGHT: 289 LBS

## 2018-04-24 ENCOUNTER — HOSPITAL ENCOUNTER (OUTPATIENT)
Dept: OTHER | Facility: HOSPITAL | Age: 48
Setting detail: THERAPIES SERIES
Discharge: HOME | End: 2018-04-24
Attending: INTERNAL MEDICINE

## 2018-04-26 VITALS
HEART RATE: 81 BPM | SYSTOLIC BLOOD PRESSURE: 122 MMHG | WEIGHT: 283 LBS | DIASTOLIC BLOOD PRESSURE: 80 MMHG | BODY MASS INDEX: 46 KG/M2

## 2018-05-17 ENCOUNTER — HOSPITAL ENCOUNTER (OUTPATIENT)
Dept: CARDIOLOGY CLINIC | Facility: HOSPITAL | Age: 48
Discharge: HOME | End: 2018-05-17
Attending: INTERNAL MEDICINE

## 2018-05-17 DIAGNOSIS — Z13.9 ENCOUNTER FOR SCREENING: ICD-10-CM

## 2018-05-22 VITALS
HEART RATE: 60 BPM | SYSTOLIC BLOOD PRESSURE: 122 MMHG | DIASTOLIC BLOOD PRESSURE: 79 MMHG | WEIGHT: 275.25 LBS | BODY MASS INDEX: 44.24 KG/M2 | HEIGHT: 66 IN

## 2018-05-22 NOTE — PROGRESS NOTES
Pt completed LUCERO program and attended 12/12 sessions. Pt did diet journal consistently and increased exercise. Pt los a total of 31 pounds and 3  inches off of her waist. Pt is extremely motivated to continue with lifestyle changes.      Fasting cholest

## 2018-07-05 NOTE — PATIENT INSTRUCTIONS
Rehab consult noted. Care manager met with patient and his wife to discuss choices for inpatient rehab. They have chosen [address] [place] [address] location. Referral made through [facility].  Maricel Camejo RN,CRM Thank you for choosing Hay Tello MD at [place] [address]  To Do: Saleem Garcia  1. Blood work today  2. CT chest - due in December for follow up. Call central scheduling 1-2 months prior to schedule  3.  Start daily probiotic and yogurt such as activ treatment even beyond those discussed today.  All therapies have potential risk of harm or side effects or medication interactions.  It is your duty and for your safety to discuss with the pharmacist and our office with questions, and to notify us and stop

## 2019-02-05 ENCOUNTER — TELEPHONE (OUTPATIENT)
Dept: INTERNAL MEDICINE CLINIC | Facility: CLINIC | Age: 49
End: 2019-02-05

## 2019-02-05 DIAGNOSIS — Z00.00 LABORATORY EXAM ORDERED AS PART OF ROUTINE GENERAL MEDICAL EXAMINATION: ICD-10-CM

## 2019-02-05 DIAGNOSIS — E03.9 ACQUIRED HYPOTHYROIDISM: ICD-10-CM

## 2019-02-05 DIAGNOSIS — Z00.00 LABORATORY EXAMINATION ORDERED AS PART OF A ROUTINE GENERAL MEDICAL EXAMINATION: Primary | ICD-10-CM

## 2019-02-05 NOTE — TELEPHONE ENCOUNTER
Pt requested we order labs to be completed before her appt on 3/9/19. Please notify pt when order is in system.

## 2019-02-20 ENCOUNTER — APPOINTMENT (OUTPATIENT)
Dept: LAB | Age: 49
End: 2019-02-20
Attending: INTERNAL MEDICINE
Payer: COMMERCIAL

## 2019-02-20 LAB
ALBUMIN SERPL-MCNC: 3.8 G/DL (ref 3.4–5)
ALBUMIN/GLOB SERPL: 1 {RATIO} (ref 1–2)
ALP LIVER SERPL-CCNC: 74 U/L (ref 39–100)
ALT SERPL-CCNC: 29 U/L (ref 13–56)
ANION GAP SERPL CALC-SCNC: 7 MMOL/L (ref 0–18)
AST SERPL-CCNC: 27 U/L (ref 15–37)
BASOPHILS # BLD AUTO: 0.08 X10(3) UL (ref 0–0.2)
BASOPHILS NFR BLD AUTO: 0.9 %
BILIRUB SERPL-MCNC: 0.4 MG/DL (ref 0.1–2)
BUN BLD-MCNC: 12 MG/DL (ref 7–18)
BUN/CREAT SERPL: 15.2 (ref 10–20)
CALCIUM BLD-MCNC: 9.3 MG/DL (ref 8.5–10.1)
CHLORIDE SERPL-SCNC: 105 MMOL/L (ref 98–107)
CHOLEST SMN-MCNC: 186 MG/DL (ref ?–200)
CO2 SERPL-SCNC: 28 MMOL/L (ref 21–32)
CREAT BLD-MCNC: 0.79 MG/DL (ref 0.55–1.02)
DEPRECATED RDW RBC AUTO: 41.9 FL (ref 35.1–46.3)
EOSINOPHIL # BLD AUTO: 0.22 X10(3) UL (ref 0–0.7)
EOSINOPHIL NFR BLD AUTO: 2.5 %
ERYTHROCYTE [DISTWIDTH] IN BLOOD BY AUTOMATED COUNT: 12.2 % (ref 11–15)
GLOBULIN PLAS-MCNC: 3.9 G/DL (ref 2.8–4.4)
GLUCOSE BLD-MCNC: 115 MG/DL (ref 70–99)
HCT VFR BLD AUTO: 43.9 % (ref 35–48)
HDLC SERPL-MCNC: 39 MG/DL (ref 40–59)
HGB BLD-MCNC: 14.4 G/DL (ref 12–16)
IMM GRANULOCYTES # BLD AUTO: 0.03 X10(3) UL (ref 0–1)
IMM GRANULOCYTES NFR BLD: 0.3 %
LDLC SERPL CALC-MCNC: 104 MG/DL (ref ?–100)
LYMPHOCYTES # BLD AUTO: 2.34 X10(3) UL (ref 1–4)
LYMPHOCYTES NFR BLD AUTO: 26.8 %
M PROTEIN MFR SERPL ELPH: 7.7 G/DL (ref 6.4–8.2)
MCH RBC QN AUTO: 30.3 PG (ref 26–34)
MCHC RBC AUTO-ENTMCNC: 32.8 G/DL (ref 31–37)
MCV RBC AUTO: 92.4 FL (ref 80–100)
MONOCYTES # BLD AUTO: 0.52 X10(3) UL (ref 0.1–1)
MONOCYTES NFR BLD AUTO: 6 %
NEUTROPHILS # BLD AUTO: 5.54 X10 (3) UL (ref 1.5–7.7)
NEUTROPHILS # BLD AUTO: 5.54 X10(3) UL (ref 1.5–7.7)
NEUTROPHILS NFR BLD AUTO: 63.5 %
NONHDLC SERPL-MCNC: 147 MG/DL (ref ?–130)
OSMOLALITY SERPL CALC.SUM OF ELEC: 291 MOSM/KG (ref 275–295)
PLATELET # BLD AUTO: 270 10(3)UL (ref 150–450)
POTASSIUM SERPL-SCNC: 4 MMOL/L (ref 3.5–5.1)
RBC # BLD AUTO: 4.75 X10(6)UL (ref 3.8–5.3)
SODIUM SERPL-SCNC: 140 MMOL/L (ref 136–145)
T4 FREE SERPL-MCNC: 1 NG/DL (ref 0.8–1.7)
TRIGL SERPL-MCNC: 213 MG/DL (ref 30–149)
TSI SER-ACNC: 0.93 MIU/ML (ref 0.36–3.74)
VLDLC SERPL CALC-MCNC: 43 MG/DL (ref 0–30)
WBC # BLD AUTO: 8.7 X10(3) UL (ref 4–11)

## 2019-02-26 RX ORDER — ESCITALOPRAM OXALATE 20 MG/1
TABLET ORAL
Qty: 90 TABLET | Refills: 3 | Status: SHIPPED | OUTPATIENT
Start: 2019-02-26 | End: 2020-02-26

## 2019-02-26 RX ORDER — LOSARTAN POTASSIUM 100 MG/1
TABLET ORAL
Qty: 90 TABLET | Refills: 0 | Status: SHIPPED | OUTPATIENT
Start: 2019-02-26 | End: 2019-05-15

## 2019-02-26 RX ORDER — LEVOTHYROXINE SODIUM 0.1 MG/1
TABLET ORAL
Qty: 90 TABLET | Refills: 0 | Status: SHIPPED | OUTPATIENT
Start: 2019-02-26 | End: 2019-05-15

## 2019-02-26 NOTE — TELEPHONE ENCOUNTER
Refill requested:   Requested Prescriptions     Pending Prescriptions Disp Refills   • ESCITALOPRAM 20 MG Oral Tab [Pharmacy Med Name: ESCITALOPRAM TABS 20MG] 90 tablet 3     Sig: TAKE 1 TABLET DAILY   • LEVOTHYROXINE SODIUM 100 MCG Oral Tab [Pharmacy Med

## 2019-03-09 ENCOUNTER — TELEPHONE (OUTPATIENT)
Dept: INTERNAL MEDICINE CLINIC | Facility: CLINIC | Age: 49
End: 2019-03-09

## 2019-03-09 ENCOUNTER — OFFICE VISIT (OUTPATIENT)
Dept: INTERNAL MEDICINE CLINIC | Facility: CLINIC | Age: 49
End: 2019-03-09
Payer: COMMERCIAL

## 2019-03-09 VITALS
HEIGHT: 65.75 IN | DIASTOLIC BLOOD PRESSURE: 88 MMHG | TEMPERATURE: 98 F | WEIGHT: 291.5 LBS | HEART RATE: 78 BPM | RESPIRATION RATE: 14 BRPM | BODY MASS INDEX: 47.41 KG/M2 | SYSTOLIC BLOOD PRESSURE: 128 MMHG

## 2019-03-09 DIAGNOSIS — Z12.39 SCREENING FOR BREAST CANCER: ICD-10-CM

## 2019-03-09 DIAGNOSIS — Z00.00 WELLNESS EXAMINATION: Primary | ICD-10-CM

## 2019-03-09 DIAGNOSIS — E66.01 CLASS 3 SEVERE OBESITY DUE TO EXCESS CALORIES WITH SERIOUS COMORBIDITY AND BODY MASS INDEX (BMI) OF 45.0 TO 49.9 IN ADULT (HCC): ICD-10-CM

## 2019-03-09 DIAGNOSIS — F41.9 ANXIETY AND DEPRESSION: ICD-10-CM

## 2019-03-09 DIAGNOSIS — R73.01 ELEVATED FASTING GLUCOSE: ICD-10-CM

## 2019-03-09 DIAGNOSIS — F32.A ANXIETY AND DEPRESSION: ICD-10-CM

## 2019-03-09 DIAGNOSIS — E78.1 HYPERTRIGLYCERIDEMIA: ICD-10-CM

## 2019-03-09 DIAGNOSIS — I10 ESSENTIAL HYPERTENSION: ICD-10-CM

## 2019-03-09 PROBLEM — E66.813 CLASS 3 SEVERE OBESITY DUE TO EXCESS CALORIES WITH SERIOUS COMORBIDITY AND BODY MASS INDEX (BMI) OF 45.0 TO 49.9 IN ADULT (HCC): Status: ACTIVE | Noted: 2019-03-09

## 2019-03-09 PROBLEM — E66.813 CLASS 3 SEVERE OBESITY DUE TO EXCESS CALORIES WITH SERIOUS COMORBIDITY AND BODY MASS INDEX (BMI) OF 45.0 TO 49.9 IN ADULT: Status: ACTIVE | Noted: 2019-03-09

## 2019-03-09 LAB
CARTRIDGE LOT#: 973 NUMERIC
HEMOGLOBIN A1C: 5.4 % (ref 4.3–5.6)

## 2019-03-09 PROCEDURE — 83036 HEMOGLOBIN GLYCOSYLATED A1C: CPT | Performed by: INTERNAL MEDICINE

## 2019-03-09 PROCEDURE — 99396 PREV VISIT EST AGE 40-64: CPT | Performed by: INTERNAL MEDICINE

## 2019-03-09 RX ORDER — AMLODIPINE BESYLATE 10 MG/1
10 TABLET ORAL NIGHTLY
Qty: 90 TABLET | Refills: 3 | Status: SHIPPED | OUTPATIENT
Start: 2019-03-09 | End: 2020-03-17

## 2019-03-09 NOTE — TELEPHONE ENCOUNTER
Patient brought in wellness form. Wellness form completed and faxed to [phone]. Confirmation was received and copy sent to scanning. Original placed in teal accordion file. Patient aware.

## 2019-03-09 NOTE — PATIENT INSTRUCTIONS
Decrease carbs (bread, rice, pasta, potatoes, chips, crackers, juice, soda, alcohol)     Exercise on most days of the week goal 30 min/day      Consider trial off of escitalopram if loose stools continue - can cut tablet in half to take 10mg daily for 5 da

## 2019-03-09 NOTE — PROGRESS NOTES
[facility] Internal Medicine Office Note  Chief Complaint:   Patient presents with:  Physical: Labs done      HPI:   This is a 53year old female coming in for  HPI  High glu of 115 and high trig on recent blood work    hypothryoidism - TSH at go (10 mg total) by mouth nightly.  Disp: 90 tablet Rfl: 3   ESCITALOPRAM 20 MG Oral Tab TAKE 1 TABLET DAILY Disp: 90 tablet Rfl: 3   LEVOTHYROXINE SODIUM 100 MCG Oral Tab TAKE 1 TABLET DAILY Disp: 90 tablet Rfl: 0   LOSARTAN 100 MG Oral Tab TAKE 1 TABLET NIGH Shantell Callahan is a 53year old female with  Wellness examination  (primary encounter diagnosis)  Elevated fasting glucose  Hypertriglyceridemia  Essential hypertension  Anxiety and depression  Screening for breast cancer  Class 3 severe obesity due barriers to learning. Medical education done. Outcome: Patient verbalizes understanding. Patient is notified to call with any questions, complications, allergies, or worsening or changing symptoms.   Patient is to call with any side effects or complications

## 2019-05-16 RX ORDER — LEVOTHYROXINE SODIUM 0.1 MG/1
TABLET ORAL
Qty: 90 TABLET | Refills: 1 | Status: SHIPPED | OUTPATIENT
Start: 2019-05-16 | End: 2019-11-18

## 2019-05-16 RX ORDER — LOSARTAN POTASSIUM 100 MG/1
TABLET ORAL
Qty: 90 TABLET | Refills: 1 | Status: SHIPPED | OUTPATIENT
Start: 2019-05-16 | End: 2019-11-18

## 2019-05-16 NOTE — TELEPHONE ENCOUNTER
Losartan 50mg  Last OV relevant to medication: 3/9/2019  Last refill date: 2/26/2019    #/refills: 0  When pt was asked to return for OV: 9/9/2019   Upcoming appt/reason: Blood pressure Follow-Up    Levothyroxine 100mcg  Last OV relevant to medication: 3/9

## 2019-09-17 ENCOUNTER — TELEPHONE (OUTPATIENT)
Dept: INTERNAL MEDICINE CLINIC | Facility: CLINIC | Age: 49
End: 2019-09-17

## 2019-09-17 NOTE — TELEPHONE ENCOUNTER
Records Requested from: Tasneem Flores     Fax: [phone]    **Mammogram**    Confirmation was received. Copy of form made and placed in teal accordion file. Original form sent to scanning.

## 2019-09-17 NOTE — PROGRESS NOTES
[facility] Internal Medicine Office Note  Chief Complaint:   Patient presents with:   Follow - Up: 6 months/BP check      HPI:   This is a 51year old female coming in for follow up  HPI  Mood concerns since going off of escitalopram   She notes t Sister    • Other (Prothrombin Gene Mutation) Sister         I reviewed her's Past Medical History, Past Surgical History, Family History and   Social History updated shows  Social History    Tobacco Use      Smoking status: Never Smoker      Smokeless tob Cardiovascular: Normal rate, regular rhythm and normal heart sounds. Pulmonary/Chest: Effort normal and breath sounds normal.   Neurological: She is alert. Skin: Skin is warm and dry.    Psychiatric:   +tearful       ASSESSMENT AND PLAN:   Soheila Marroquin

## 2019-09-17 NOTE — PATIENT INSTRUCTIONS
Start escitalopram half tablet for the first 10 days     Continue exercising every day for at least 20 minutes     Focus on healthy eating - increase the number of servings daily of fruits and vegetables.  Limit processed foods, fast food, restaurant food

## 2019-10-15 NOTE — PATIENT INSTRUCTIONS
See how things go from a side effect and mood perspective for another 1-2 weeks.  Consider zoloft/sertraline      Consider mindfulness/meditation to help with stress management such as Headspace lee

## 2019-10-15 NOTE — PROGRESS NOTES
[facility] Internal Medicine Office Note  Chief Complaint:   Patient presents with:   Follow - Up: 1 month-declines flu shot      HPI:   This is a 51year old female coming in for f/u for depression  HPI  She started escitalopram at 10mg and then Maternal Grandmother         Liver   • Heart Disorder Maternal Grandfather    • Stroke Maternal Grandfather    • Diabetes Paternal Grandmother    • Heart Attack Paternal Grandmother    • Stroke Paternal Grandfather    • Other (Brain Tumor) Paternal Ayush Alter following:    Height as of this encounter: 66\". Weight as of this encounter: 297 lb (134.7 kg). Vital signs reviewed. Appears stated age, well groomed. Physical Exam   Vitals reviewed. Constitutional: She appears well-developed. No distress.    HEN with any side effects or complications from the treatments as a result of today.      Over 30 min spent face to face with patient, over half in counseling regarding depression/anxiety     Ganesh Tobin MD

## 2019-11-19 RX ORDER — LEVOTHYROXINE SODIUM 0.1 MG/1
100 TABLET ORAL
Qty: 90 TABLET | Refills: 1 | Status: SHIPPED | OUTPATIENT
Start: 2019-11-19 | End: 2020-03-17

## 2019-11-19 RX ORDER — LOSARTAN POTASSIUM 100 MG/1
100 TABLET ORAL NIGHTLY
Qty: 90 TABLET | Refills: 1 | Status: SHIPPED | OUTPATIENT
Start: 2019-11-19 | End: 2020-03-03

## 2019-11-19 NOTE — TELEPHONE ENCOUNTER
Levothyroxine Sodium 100 mcg Oral Tab    Passed Protocol    Last OV relevant to medication: 10/15/2019  Last refill date: 5/16/2019     #/refills: #90 w/ 1 refill   When pt was asked to return for OV: 6 weeks  Upcoming appt/reason: No future appointments

## 2020-01-09 ENCOUNTER — OFFICE VISIT (OUTPATIENT)
Dept: INTERNAL MEDICINE CLINIC | Facility: CLINIC | Age: 50
End: 2020-01-09
Payer: COMMERCIAL

## 2020-01-09 ENCOUNTER — TELEPHONE (OUTPATIENT)
Dept: INTERNAL MEDICINE CLINIC | Facility: CLINIC | Age: 50
End: 2020-01-09

## 2020-01-09 VITALS
OXYGEN SATURATION: 98 % | DIASTOLIC BLOOD PRESSURE: 72 MMHG | SYSTOLIC BLOOD PRESSURE: 124 MMHG | HEART RATE: 68 BPM | RESPIRATION RATE: 16 BRPM | TEMPERATURE: 98 F | WEIGHT: 293 LBS | HEIGHT: 66 IN | BODY MASS INDEX: 47.09 KG/M2

## 2020-01-09 DIAGNOSIS — F41.9 ANXIETY AND DEPRESSION: ICD-10-CM

## 2020-01-09 DIAGNOSIS — I10 ESSENTIAL HYPERTENSION: Primary | ICD-10-CM

## 2020-01-09 DIAGNOSIS — K64.4 EXTERNAL HEMORRHOID: ICD-10-CM

## 2020-01-09 DIAGNOSIS — F32.A ANXIETY AND DEPRESSION: ICD-10-CM

## 2020-01-09 PROCEDURE — 99213 OFFICE O/P EST LOW 20 MIN: CPT | Performed by: INTERNAL MEDICINE

## 2020-01-09 NOTE — TELEPHONE ENCOUNTER
Pt notified of provider message below and verbalized understanding. Pt agreeable to come in for OV. OV scheduled.   Future Appointments   Date Time Provider Shannan Ling   1/9/2020 12:30 PM Chantal Mccord MD [facility] [place]

## 2020-01-09 NOTE — PROGRESS NOTES
[facility] Internal Medicine Office Note  Chief Complaint:   Patient presents with:  Hemorrhoids      HPI:   This is a 51year old female coming in for anal pain  HPI   For the past 4-6 weeks, she has had irritation at the anal area  For 20 years Sister    • Other (Prothrombin Gene Mutation) Sister    • Other (Blood Clot) Sister    • Other (Prothrombin Gene Mutation) Sister         I reviewed her's Past Medical History, Past Surgical History, Family History and   Social History updated shows  Alta signs reviewed. Appears stated age, well groomed. Physical Exam   Vitals reviewed. Constitutional: She appears well-developed. No distress. HENT:   Head: Normocephalic and atraumatic. Eyes: Conjunctivae are normal.   Neck: Neck supple.    Cardiovascu

## 2020-01-09 NOTE — TELEPHONE ENCOUNTER
Patient has had external hemorrhoid ongoing but this morning she feels it is much worse; she would like to discuss with nurse please callback

## 2020-01-09 NOTE — TELEPHONE ENCOUNTER
Pt believes she has an external hemorrhoid (describes as being on the outside of rectum). Pt states it has been \"causing problems\" the past couple weeks.  Pt states within last 12-24 hours she is having severe pain even with sitting, laying in bed, or aft

## 2020-01-09 NOTE — TELEPHONE ENCOUNTER
Ok to use preparation H but if severe pain, may be related to anal fissure.  I can see her today at 12:30 if she would like

## 2020-01-09 NOTE — PATIENT INSTRUCTIONS
Prescription - Proctofoam    Preparation H     Witch hazel pads (Tucks)     Sitz bath/take a bath after bowel movements and at least twice daily     Try [facility] or [facility] or [facility] for donut pillow     Call today to make appointmen

## 2020-01-28 ENCOUNTER — OFFICE VISIT (OUTPATIENT)
Dept: SURGERY | Facility: CLINIC | Age: 50
End: 2020-01-28
Payer: COMMERCIAL

## 2020-01-28 VITALS
HEART RATE: 67 BPM | RESPIRATION RATE: 18 BRPM | SYSTOLIC BLOOD PRESSURE: 177 MMHG | DIASTOLIC BLOOD PRESSURE: 92 MMHG | WEIGHT: 293 LBS | TEMPERATURE: 98 F | HEIGHT: 66 IN | BODY MASS INDEX: 47.09 KG/M2

## 2020-01-28 DIAGNOSIS — I10 ESSENTIAL HYPERTENSION: ICD-10-CM

## 2020-01-28 DIAGNOSIS — K62.0 ANAL POLYP: Primary | ICD-10-CM

## 2020-01-28 DIAGNOSIS — E66.01 CLASS 3 SEVERE OBESITY DUE TO EXCESS CALORIES WITH SERIOUS COMORBIDITY AND BODY MASS INDEX (BMI) OF 45.0 TO 49.9 IN ADULT (HCC): ICD-10-CM

## 2020-01-28 DIAGNOSIS — E03.9 ACQUIRED HYPOTHYROIDISM: ICD-10-CM

## 2020-01-28 PROCEDURE — 99215 OFFICE O/P EST HI 40 MIN: CPT | Performed by: COLON & RECTAL SURGERY

## 2020-01-28 NOTE — H&P
New Patient Visit Note       Active Problems      1. Anal polyp    2. Class 3 severe obesity due to excess calories with serious comorbidity and body mass index (BMI) of 45.0 to 49.9 in adult ([facility]    3. Essential hypertension    4.  Acquired hypothyroidism distention, nausea, vomiting, or unintentional weight loss. She does state that she has had a nodule on her rectum that has been there for many years but has never caused her any issues.   The patient has had a colonoscopy greater than 10 years ago alcides Diagnosis Date   • Anxiety    • Depression    • Essential hypertension    • Hyperlipidemia    • Hypothyroidism      Past Surgical History:   Procedure Laterality Date   • CARPAL TUNNEL RELEASE Bilateral    • COLONOSCOPY      X2   • COLONOSCOPY     • D & 100 MG Oral Tab, Take 1 tablet (100 mg total) by mouth nightly., Disp: 90 tablet, Rfl: 1  •  Levothyroxine Sodium 100 MCG Oral Tab, Take 1 tablet (100 mcg total) by mouth once daily. , Disp: 90 tablet, Rfl: 1  •  ALPRAZolam 0.25 MG Oral Tab, Take 1 tablet ( 47.94 kg/m²   Physical Exam   Constitutional: She is oriented to person, place, and time. She appears well-developed and well-nourished. She is cooperative. No distress. Eyes: Conjunctivae are normal. No scleral icterus.    Neck: Trachea normal. Neck supp examination of the patient's rectum reveals a large anterior midline anal canal polyp. This is exquisitely tender to palpation. I am unable to determine if it is associated with a fissure due to the patient's pain at this time.   There are no other visibl is afraid to have bowel movements due to the severe pain. She states that the pain is greater on the right than the left. She states that after she does have a bowel movement she states she will feel rectal spasms when she is sitting.   She also feels t activity normal pitch. There  is no rebounding tenderness or guarding. There are no signs of ascites or peritonitis. The liver and spleen are nonpalpable. There are no palpable masses or hernias. Body mass index is 47.94 kg/m².    Clinical examination encounter. Imaging & Referrals   None    Follow Up  Return for Surgery.     Warrick Dancer, MD

## 2020-01-29 ENCOUNTER — TELEPHONE (OUTPATIENT)
Dept: SURGERY | Facility: CLINIC | Age: 50
End: 2020-01-29

## 2020-01-29 NOTE — PATIENT INSTRUCTIONS
The patient presents today in consultation of Dr. Daniela Armendariz for multiple rectal complaints. The patient states that approximately 2 months ago she was in the shower when she began to feel a pain in her rectum.   The patient states that the pain became very has a past surgical history significant for a hysterectomy secondary to fibroids. She has subsequently underwent a ventral hernia repair with Dr. Maggie Crow. The patient is a G0, P0.     The patient has a past medical history significant for hypertension, hyp scheduled to undergo a transanal [facility] an [facility]. All risks, benefits, complications and alternatives to the proposed operation were fully discussed with the patient.  All questions from t

## 2020-01-29 NOTE — TELEPHONE ENCOUNTER
Pt. Contacted the office stating she needs to get in sooner for surgery. Pt. States she is in 10/10 pain and just found out she has a family history of colon and rectal cancer.  Pt. Stated she is following the guidelines that SANGEETHA told her to yesterday, and

## 2020-01-29 NOTE — TELEPHONE ENCOUNTER
Pt called back to surgery scheduler and asked her if she could be moved up earlier. Surgery scheduler transferred her to us stating there is no opening unless someone else cancels. Pt is not currently taking any pain medication.  Writer informed pt tylenol

## 2020-02-05 ENCOUNTER — PATIENT MESSAGE (OUTPATIENT)
Dept: INTERNAL MEDICINE CLINIC | Facility: CLINIC | Age: 50
End: 2020-02-05

## 2020-02-06 ENCOUNTER — APPOINTMENT (OUTPATIENT)
Dept: LAB | Age: 50
End: 2020-02-06
Attending: COLON & RECTAL SURGERY
Payer: COMMERCIAL

## 2020-02-06 DIAGNOSIS — K63.5 POLYP OF SIGMOID COLON, UNSPECIFIED TYPE: ICD-10-CM

## 2020-02-06 LAB
ALBUMIN SERPL-MCNC: 4.1 G/DL (ref 3.4–5)
ALBUMIN/GLOB SERPL: 0.9 {RATIO} (ref 1–2)
ALP LIVER SERPL-CCNC: 82 U/L (ref 39–100)
ALT SERPL-CCNC: 54 U/L (ref 13–56)
ANION GAP SERPL CALC-SCNC: 4 MMOL/L (ref 0–18)
AST SERPL-CCNC: 40 U/L (ref 15–37)
ATRIAL RATE: 61 BPM
BILIRUB SERPL-MCNC: 0.7 MG/DL (ref 0.1–2)
BUN BLD-MCNC: 9 MG/DL (ref 7–18)
BUN/CREAT SERPL: 9.6 (ref 10–20)
CALCIUM BLD-MCNC: 8.8 MG/DL (ref 8.5–10.1)
CHLORIDE SERPL-SCNC: 106 MMOL/L (ref 98–112)
CO2 SERPL-SCNC: 27 MMOL/L (ref 21–32)
CREAT BLD-MCNC: 0.94 MG/DL (ref 0.55–1.02)
GLOBULIN PLAS-MCNC: 4.4 G/DL (ref 2.8–4.4)
GLUCOSE BLD-MCNC: 128 MG/DL (ref 70–99)
M PROTEIN MFR SERPL ELPH: 8.5 G/DL (ref 6.4–8.2)
OSMOLALITY SERPL CALC.SUM OF ELEC: 284 MOSM/KG (ref 275–295)
P AXIS: 46 DEGREES
P-R INTERVAL: 158 MS
PATIENT FASTING Y/N/NP: YES
POTASSIUM SERPL-SCNC: 4.3 MMOL/L (ref 3.5–5.1)
Q-T INTERVAL: 430 MS
QRS DURATION: 78 MS
QTC CALCULATION (BEZET): 432 MS
R AXIS: 1 DEGREES
SODIUM SERPL-SCNC: 137 MMOL/L (ref 136–145)
T AXIS: 20 DEGREES
VENTRICULAR RATE: 61 BPM

## 2020-02-06 PROCEDURE — 80053 COMPREHEN METABOLIC PANEL: CPT

## 2020-02-06 PROCEDURE — 36415 COLL VENOUS BLD VENIPUNCTURE: CPT

## 2020-02-06 PROCEDURE — 93010 ELECTROCARDIOGRAM REPORT: CPT | Performed by: INTERNAL MEDICINE

## 2020-02-06 PROCEDURE — 93005 ELECTROCARDIOGRAM TRACING: CPT

## 2020-02-07 NOTE — TELEPHONE ENCOUNTER
Patient will stop by tomorrow please can we put paperwork up front for patient to fill out/?   Advised orders 8am-12pm

## 2020-02-07 NOTE — TELEPHONE ENCOUNTER
Form placed at  file folder under \"W\". Copy placed in back pod file folder under \"W\". Copy sent to scan.

## 2020-02-08 NOTE — TELEPHONE ENCOUNTER
Sarwat Parnell,   I faxed your paperwork to [phone] and I received confirmation. Any questions you can reach the office at [phone] on Monday.      Thank you,   Mario Moise

## 2020-02-11 ENCOUNTER — TELEPHONE (OUTPATIENT)
Dept: SURGERY | Facility: CLINIC | Age: 50
End: 2020-02-11

## 2020-02-13 PROCEDURE — 88305 TISSUE EXAM BY PATHOLOGIST: CPT | Performed by: COLON & RECTAL SURGERY

## 2020-02-14 ENCOUNTER — LAB REQUISITION (OUTPATIENT)
Dept: LAB | Facility: HOSPITAL | Age: 50
End: 2020-02-14
Payer: COMMERCIAL

## 2020-02-14 DIAGNOSIS — K62.0 ANAL POLYP: ICD-10-CM

## 2020-02-19 ENCOUNTER — PATIENT MESSAGE (OUTPATIENT)
Dept: INTERNAL MEDICINE CLINIC | Facility: CLINIC | Age: 50
End: 2020-02-19

## 2020-02-20 ENCOUNTER — OFFICE VISIT (OUTPATIENT)
Dept: SURGERY | Facility: CLINIC | Age: 50
End: 2020-02-20

## 2020-02-20 VITALS — DIASTOLIC BLOOD PRESSURE: 79 MMHG | TEMPERATURE: 99 F | HEART RATE: 64 BPM | SYSTOLIC BLOOD PRESSURE: 117 MMHG

## 2020-02-20 DIAGNOSIS — E66.01 CLASS 3 SEVERE OBESITY DUE TO EXCESS CALORIES WITH SERIOUS COMORBIDITY AND BODY MASS INDEX (BMI) OF 45.0 TO 49.9 IN ADULT (HCC): ICD-10-CM

## 2020-02-20 DIAGNOSIS — E78.1 HYPERTRIGLYCERIDEMIA: ICD-10-CM

## 2020-02-20 DIAGNOSIS — I10 ESSENTIAL HYPERTENSION: ICD-10-CM

## 2020-02-20 DIAGNOSIS — K62.0 ANAL POLYP: Primary | ICD-10-CM

## 2020-02-20 DIAGNOSIS — F41.9 ANXIETY AND DEPRESSION: ICD-10-CM

## 2020-02-20 DIAGNOSIS — E03.9 ACQUIRED HYPOTHYROIDISM: ICD-10-CM

## 2020-02-20 DIAGNOSIS — F32.A ANXIETY AND DEPRESSION: ICD-10-CM

## 2020-02-20 DIAGNOSIS — Z90.711 HISTORY OF HYSTERECTOMY, SUPRACERVICAL: ICD-10-CM

## 2020-02-20 PROCEDURE — 99024 POSTOP FOLLOW-UP VISIT: CPT | Performed by: COLON & RECTAL SURGERY

## 2020-02-20 NOTE — PROGRESS NOTES
Post Operative Visit Note       Active Problems  1. Anal polyp    2. Essential hypertension    3. Anxiety and depression    4. Acquired hypothyroidism    5. History of hysterectomy, supracervical    6.  Class 3 severe obesity due to excess calories with ser Authorizing Provider:  Misael Scales MD          Collected:           02/13/2020 10:40 AM           Ordering Location:     [facility]        Received:            02/14/2020 08:06 PM           Pathologist:           [place] • Cancer Maternal Grandmother         Liver   • Heart Disorder Maternal Grandfather    • Stroke Maternal Grandfather    • Diabetes Paternal Grandmother    • Heart Attack Paternal Grandmother    • Stroke Paternal Grandfather    • Other (Brain Tumor) [employer]   Constitutional: Negative for chills, diaphoresis, fatigue, fever and unexpected weight change. HENT: Negative for hearing loss, nosebleeds, sore throat and trouble swallowing.     Respiratory: Negative for apnea, cough, shortness of breath and w benign. Results are consistent with a greatly enlarged hypertrophic anal papillae. It had granulation tissue with acute and chronic inflammation which accounts for its neoplastic appearance. Patient is doing well. She has no fever or chills.   She h

## 2020-02-21 NOTE — PATIENT INSTRUCTIONS
I am seeing this patient for further consultation regarding an anal canal polyp. I removed on February 13, 2020. I am happy to report that the results are completely benign. Results are consistent with a greatly enlarged hypertrophic anal papillae.   I

## 2020-02-25 ENCOUNTER — PATIENT MESSAGE (OUTPATIENT)
Dept: INTERNAL MEDICINE CLINIC | Facility: CLINIC | Age: 50
End: 2020-02-25

## 2020-02-26 NOTE — TELEPHONE ENCOUNTER
From: April Lozada  To: Dorothy Garcia MD  Sent: 2/25/2020 2:16 PM CST  Subject: Other    Hi Dr. Vasquez Lorenzo,    I have a few items that I need your assistance with.     My company requires a \"release to [employer] Leigh Renu" note from you for me to return to work on [employer]

## 2020-02-26 NOTE — TELEPHONE ENCOUNTER
From: Emile Palmer  To: Davida Burr MD  Sent: 2/25/2020 2:18 PM CST  Subject: Other    I apologize, just realized I didn't list the 2 medications I need from my message I just sent.     Escitalopram 20MG  Losartan 100MG    ThanksSoheila

## 2020-02-27 RX ORDER — ALPRAZOLAM 0.25 MG/1
0.25 TABLET ORAL 2 TIMES DAILY PRN
Qty: 30 TABLET | Refills: 3 | Status: SHIPPED | OUTPATIENT
Start: 2020-02-27 | End: 2021-09-12

## 2020-02-27 RX ORDER — ESCITALOPRAM OXALATE 20 MG/1
20 TABLET ORAL
Qty: 90 TABLET | Refills: 3 | Status: SHIPPED | OUTPATIENT
Start: 2020-02-27 | End: 2021-03-15

## 2020-03-05 RX ORDER — LOSARTAN POTASSIUM 100 MG/1
100 TABLET ORAL NIGHTLY
Qty: 90 TABLET | Refills: 1 | Status: SHIPPED | OUTPATIENT
Start: 2020-03-05 | End: 2020-09-02

## 2020-03-05 NOTE — TELEPHONE ENCOUNTER
losartan 100 MG Oral Tab    Last OV relevant to medication: 01/09/2020  Last refill date: 11/19/2019     #/refills: #90, 1 refill   When pt was asked to return for OV: none noted   Upcoming appt/reason: 03/31/2020  Related labs: [facility] 02/06/2020

## 2020-03-06 ENCOUNTER — MED REC SCAN ONLY (OUTPATIENT)
Dept: SURGERY | Facility: CLINIC | Age: 50
End: 2020-03-06

## 2020-03-10 RX ORDER — POLYETHYLENE GLYCOL 3350, SODIUM CHLORIDE, SODIUM BICARBONATE, POTASSIUM CHLORIDE 420; 11.2; 5.72; 1.48 G/4L; G/4L; G/4L; G/4L
POWDER, FOR SOLUTION ORAL
Qty: 1 BOTTLE | Refills: 0 | Status: SHIPPED | OUTPATIENT
Start: 2020-03-10 | End: 2020-11-18

## 2020-03-19 ENCOUNTER — MED REC SCAN ONLY (OUTPATIENT)
Dept: SURGERY | Facility: CLINIC | Age: 50
End: 2020-03-19

## 2020-03-19 ENCOUNTER — PATIENT MESSAGE (OUTPATIENT)
Dept: INTERNAL MEDICINE CLINIC | Facility: CLINIC | Age: 50
End: 2020-03-19

## 2020-03-19 RX ORDER — AMLODIPINE BESYLATE 10 MG/1
10 TABLET ORAL NIGHTLY
Qty: 90 TABLET | Refills: 1 | Status: SHIPPED | OUTPATIENT
Start: 2020-03-19 | End: 2020-10-09

## 2020-03-19 RX ORDER — LEVOTHYROXINE SODIUM 0.1 MG/1
100 TABLET ORAL
Qty: 90 TABLET | Refills: 3 | Status: SHIPPED | OUTPATIENT
Start: 2020-03-19 | End: 2021-03-15

## 2020-05-26 ENCOUNTER — TELEPHONE (OUTPATIENT)
Dept: SURGERY | Facility: CLINIC | Age: 50
End: 2020-05-26

## 2020-05-26 DIAGNOSIS — Z01.818 PREPROCEDURAL EXAMINATION: Primary | ICD-10-CM

## 2020-05-26 NOTE — TELEPHONE ENCOUNTER
[facility] stating this pt needs a Covid 19 test prior to her procedure scheduled 6/2/2020.   This writer placed the order for the test and this writer attempted to call to inform the pt but the phone just rang, no voicemail pi

## 2020-05-27 ENCOUNTER — TELEPHONE (OUTPATIENT)
Dept: SURGERY | Facility: CLINIC | Age: 50
End: 2020-05-27

## 2020-05-30 ENCOUNTER — LAB ENCOUNTER (OUTPATIENT)
Dept: LAB | Facility: HOSPITAL | Age: 50
End: 2020-05-30
Attending: COLON & RECTAL SURGERY
Payer: COMMERCIAL

## 2020-05-30 DIAGNOSIS — Z01.818 PREPROCEDURAL EXAMINATION: ICD-10-CM

## 2020-06-01 ENCOUNTER — TELEPHONE (OUTPATIENT)
Dept: SURGERY | Facility: CLINIC | Age: 50
End: 2020-06-01

## 2020-06-01 DIAGNOSIS — Z01.812 PRE-PROCEDURE LAB EXAM: Primary | ICD-10-CM

## 2020-06-01 DIAGNOSIS — Z01.818 PRE-OP TESTING: Primary | ICD-10-CM

## 2020-06-01 NOTE — TELEPHONE ENCOUNTER
Called to inform pt that we do not have COVID test results, that we will be watching for them and contact her if they arrive tonight. Another order placed for the rapid test, CFS will be contacting pt on further measures.  Procedure is tomorrow

## 2020-06-02 ENCOUNTER — LAB REQUISITION (OUTPATIENT)
Dept: LAB | Facility: HOSPITAL | Age: 50
End: 2020-06-02
Payer: COMMERCIAL

## 2020-06-02 DIAGNOSIS — Z12.11 ENCOUNTER FOR SCREENING FOR MALIGNANT NEOPLASM OF COLON: ICD-10-CM

## 2020-06-02 DIAGNOSIS — Z80.0 FAMILY HISTORY OF MALIGNANT NEOPLASM OF DIGESTIVE ORGANS: ICD-10-CM

## 2020-06-02 PROCEDURE — 88305 TISSUE EXAM BY PATHOLOGIST: CPT | Performed by: COLON & RECTAL SURGERY

## 2020-06-11 ENCOUNTER — PATIENT OUTREACH (OUTPATIENT)
Dept: SURGERY | Facility: CLINIC | Age: 50
End: 2020-06-11

## 2020-06-11 NOTE — PROGRESS NOTES
Per the colonoscopy results sheet and op note, patient is due for a 3 year colonoscopy recall due to adenoma. Recall placed. Patient notified and voiced understanding. No further questions from patient at this time.  Will call office with any questions or c

## 2020-06-18 ENCOUNTER — OFFICE VISIT (OUTPATIENT)
Dept: INTERNAL MEDICINE CLINIC | Facility: CLINIC | Age: 50
End: 2020-06-18
Payer: COMMERCIAL

## 2020-06-18 VITALS
BODY MASS INDEX: 47.65 KG/M2 | SYSTOLIC BLOOD PRESSURE: 126 MMHG | HEART RATE: 78 BPM | HEIGHT: 65.75 IN | DIASTOLIC BLOOD PRESSURE: 70 MMHG | TEMPERATURE: 98 F | WEIGHT: 293 LBS

## 2020-06-18 DIAGNOSIS — Z00.00 ENCOUNTER FOR ROUTINE ADULT MEDICAL EXAMINATION: Primary | ICD-10-CM

## 2020-06-18 DIAGNOSIS — R20.0 BILATERAL HAND NUMBNESS: ICD-10-CM

## 2020-06-18 DIAGNOSIS — F32.A ANXIETY AND DEPRESSION: ICD-10-CM

## 2020-06-18 DIAGNOSIS — M79.605 LEG PAIN, LATERAL, LEFT: ICD-10-CM

## 2020-06-18 DIAGNOSIS — I10 ESSENTIAL HYPERTENSION: ICD-10-CM

## 2020-06-18 DIAGNOSIS — Z00.00 LABORATORY EXAM ORDERED AS PART OF ROUTINE GENERAL MEDICAL EXAMINATION: ICD-10-CM

## 2020-06-18 DIAGNOSIS — F41.9 ANXIETY AND DEPRESSION: ICD-10-CM

## 2020-06-18 DIAGNOSIS — Z12.31 ENCOUNTER FOR SCREENING MAMMOGRAM FOR BREAST CANCER: ICD-10-CM

## 2020-06-18 DIAGNOSIS — E03.9 HYPOTHYROIDISM, UNSPECIFIED TYPE: ICD-10-CM

## 2020-06-18 PROCEDURE — 99396 PREV VISIT EST AGE 40-64: CPT | Performed by: INTERNAL MEDICINE

## 2020-06-18 NOTE — PROGRESS NOTES
[facility] Internal Medicine Office Note  Chief Complaint:   Patient presents with:  CPX: fasting       HPI:   This is a 51year old female coming in for physical  HPI  Due for mammo and has appt with gyne     HTN - has been well controlled    An (Prothrombin Gene Mutation) Sister    • Other (Blood Clot) Sister    • Other (Prothrombin Gene Mutation) Sister         I reviewed her's Past Medical History, Past Surgical History, Family History and   Social History updated shows  Social History    Anaac °C) (Oral)   Ht 65.75\"   Wt 298 lb 8 oz (135.4 kg)   BMI 48.55 kg/m²  Estimated body mass index is 48.55 kg/m² as calculated from the following:    Height as of this encounter: 65.75\". Weight as of this encounter: 298 lb 8 oz (135.4 kg).    Vital signs PT. She is seeing chiropracter     Bilateral hand numbness - check vit B12; h/o carpal tunnel release     Orders Placed This Encounter      CBC W Differential W Platelet [E]      Comp Metabolic Panel (14) [E]      Lipid Panel [E]      TSH W Reflex To Free

## 2020-06-18 NOTE — PATIENT INSTRUCTIONS
Blood work    Call to make appointment for mammogram     Increase exercise to 20 minutes/day on most days of the week   Consider low-impact exercise such as pilates/yoga

## 2020-06-30 ENCOUNTER — TELEPHONE (OUTPATIENT)
Dept: INTERNAL MEDICINE CLINIC | Facility: CLINIC | Age: 50
End: 2020-06-30

## 2020-06-30 NOTE — TELEPHONE ENCOUNTER
Incoming fax from [facility] with mammogram results     Placed in  in basket for review

## 2020-09-03 RX ORDER — LOSARTAN POTASSIUM 100 MG/1
100 TABLET ORAL NIGHTLY
Qty: 90 TABLET | Refills: 1 | Status: SHIPPED | OUTPATIENT
Start: 2020-09-03 | End: 2021-03-15

## 2020-10-09 ENCOUNTER — HOSPITAL ENCOUNTER (OUTPATIENT)
Age: 50
Discharge: HOME | End: 2020-10-09
Payer: COMMERCIAL

## 2020-10-09 VITALS
SYSTOLIC BLOOD PRESSURE: 124 MMHG | HEART RATE: 62 BPM | RESPIRATION RATE: 16 BRPM | BODY MASS INDEX: 34 KG/M2 | WEIGHT: 210 LBS | TEMPERATURE: 98 F | DIASTOLIC BLOOD PRESSURE: 57 MMHG | OXYGEN SATURATION: 98 %

## 2020-10-09 DIAGNOSIS — R42 VERTIGO: Primary | ICD-10-CM

## 2020-10-09 PROCEDURE — 99213 OFFICE O/P EST LOW 20 MIN: CPT

## 2020-10-09 PROCEDURE — 99204 OFFICE O/P NEW MOD 45 MIN: CPT

## 2020-10-09 RX ORDER — MECLIZINE HYDROCHLORIDE 25 MG/1
25 TABLET ORAL 3 TIMES DAILY PRN
Qty: 15 TABLET | Refills: 0 | Status: SHIPPED | OUTPATIENT
Start: 2020-10-09 | End: 2021-09-22

## 2020-10-09 RX ORDER — AMLODIPINE BESYLATE 10 MG/1
10 TABLET ORAL NIGHTLY
Qty: 90 TABLET | Refills: 1 | Status: SHIPPED | OUTPATIENT
Start: 2020-10-09 | End: 2021-03-15

## 2020-10-09 RX ORDER — MECLIZINE HCL 12.5 MG/1
25 TABLET ORAL ONCE
Status: COMPLETED | OUTPATIENT
Start: 2020-10-09 | End: 2020-10-09

## 2020-10-09 NOTE — ED PROVIDER NOTES
Patient Seen in: [facility]      History   Patient presents with:  Dizziness  Nausea/Vomiting/Diarrhea    Stated Complaint: NAUSEA/VOMITING/DIZZINESS/LIGHT SENSITIVITY SINCE LAST NIGHT    HPI    67-year-old female with past medi [10/09/20 1559]   /86   Pulse 62   Resp 16   Temp 98.2 °F (36.8 °C)   Temp src Temporal   SpO2 97 %   O2 Device None (Room air)       Current:/57   Pulse 62   Temp 98.2 °F (36.8 °C) (Temporal)   Resp 16   Wt 95.3 kg   SpO2 98%   BMI 34.15 kg/m² consistent with peripheral vertigo. Patient is able to ambulate without difficulty. Will trial a course of meclizine.   Patient is agreeable to this plan      Re-evaluation and MDM at 17:38 : Patient reports significantly better after meclizine administer

## 2020-10-09 NOTE — ED INITIAL ASSESSMENT (HPI)
Patient reports dizziness beginning last night continued today followed by nausea and vomited x2 since 2pm. Still feels like room is spinning.

## 2020-10-09 NOTE — TELEPHONE ENCOUNTER
Medication(s) to Refill:   Requested Prescriptions     Pending Prescriptions Disp Refills   • amLODIPine Besylate 10 MG Oral Tab 90 tablet 1     Sig: Take 1 tablet (10 mg total) by mouth nightly.        LOV: 6/18/2020   RTC: none noted     Last Refill: 3/19

## 2020-10-16 ENCOUNTER — TELEMEDICINE (OUTPATIENT)
Dept: INTERNAL MEDICINE CLINIC | Facility: CLINIC | Age: 50
End: 2020-10-16
Payer: COMMERCIAL

## 2020-10-16 DIAGNOSIS — R42 VERTIGO: Primary | ICD-10-CM

## 2020-10-16 DIAGNOSIS — R09.81 NASAL CONGESTION: ICD-10-CM

## 2020-10-16 DIAGNOSIS — M54.50 CHRONIC MIDLINE LOW BACK PAIN WITHOUT SCIATICA: ICD-10-CM

## 2020-10-16 DIAGNOSIS — G89.29 CHRONIC MIDLINE LOW BACK PAIN WITHOUT SCIATICA: ICD-10-CM

## 2020-10-16 PROCEDURE — 99213 OFFICE O/P EST LOW 20 MIN: CPT | Performed by: INTERNAL MEDICINE

## 2020-10-16 NOTE — PROGRESS NOTES
This is a telemedicine visit with live, interactive video and audio. Patient understands and accepts financial responsibility for any deductible, co-insurance and/or co-pays associated with this service.     SUBJECTIVE    She was seen in urgent care for Maternal Grandfather    • Diabetes Paternal Grandmother    • Heart Attack Paternal Grandmother    • Stroke Paternal Grandfather    • Other (Brain Tumor) Paternal Grandfather    • Infectious Disease Sister    • Other (Prothrombin Gene Mutation) Sister    • midline low back pain without sciatica - she has been seeking chiropractic care for past year but symptoms persist. Recommend X-ray and nsaids for 10 day course of aleve bid prn  -     XR LUMBAR SPINE (MIN 4 VIEWS) (CPT=72110);  Future    Nasal congestion -

## 2020-10-17 ENCOUNTER — HOSPITAL ENCOUNTER (OUTPATIENT)
Dept: GENERAL RADIOLOGY | Age: 50
Discharge: HOME | End: 2020-10-17
Attending: INTERNAL MEDICINE
Payer: COMMERCIAL

## 2020-10-17 DIAGNOSIS — G89.29 CHRONIC MIDLINE LOW BACK PAIN WITHOUT SCIATICA: ICD-10-CM

## 2020-10-17 DIAGNOSIS — M54.50 CHRONIC MIDLINE LOW BACK PAIN WITHOUT SCIATICA: ICD-10-CM

## 2020-10-17 PROCEDURE — 72110 X-RAY EXAM L-2 SPINE 4/>VWS: CPT | Performed by: INTERNAL MEDICINE

## 2020-10-27 ENCOUNTER — TELEPHONE (OUTPATIENT)
Dept: INTERNAL MEDICINE CLINIC | Facility: CLINIC | Age: 50
End: 2020-10-27

## 2020-11-02 ENCOUNTER — PATIENT MESSAGE (OUTPATIENT)
Dept: INTERNAL MEDICINE CLINIC | Facility: CLINIC | Age: 50
End: 2020-11-02

## 2020-11-02 DIAGNOSIS — R93.7 ABNORMAL MRI, LUMBAR SPINE: Primary | ICD-10-CM

## 2020-11-02 NOTE — TELEPHONE ENCOUNTER
From: Yamila Fregoso  To: Tatum Salguero MD  Sent: 11/2/2020 10:01 AM CST  Subject: Referral Request    Hi Dr. Flori Donald contacted Dr. Yanira Gomes office to make an appointment after the MRI on my back.  They've advised me they need a referral from

## 2020-11-04 ENCOUNTER — PATIENT MESSAGE (OUTPATIENT)
Dept: INTERNAL MEDICINE CLINIC | Facility: CLINIC | Age: 50
End: 2020-11-04

## 2020-11-04 NOTE — TELEPHONE ENCOUNTER
From: Mau Watson  To: Carlos Marx MD  Sent: 11/4/2020 10:39 AM CST  Subject: Referral Request    Thanks Dr. Lynnette Valencia for the referral to Dr. Raquel Evans. I've called the office and I have an appointment with Dr. Raquel Evans on Dec 17.  The office advised

## 2020-11-18 ENCOUNTER — OFFICE VISIT (OUTPATIENT)
Dept: SURGERY | Facility: CLINIC | Age: 50
End: 2020-11-18
Payer: COMMERCIAL

## 2020-11-18 VITALS
BODY MASS INDEX: 47.09 KG/M2 | WEIGHT: 293 LBS | SYSTOLIC BLOOD PRESSURE: 116 MMHG | HEART RATE: 63 BPM | HEIGHT: 66 IN | DIASTOLIC BLOOD PRESSURE: 71 MMHG

## 2020-11-18 DIAGNOSIS — M47.816 LUMBAR SPONDYLOSIS: Primary | ICD-10-CM

## 2020-11-18 PROCEDURE — 3008F BODY MASS INDEX DOCD: CPT | Performed by: PHYSICIAN ASSISTANT

## 2020-11-18 PROCEDURE — 3078F DIAST BP <80 MM HG: CPT | Performed by: PHYSICIAN ASSISTANT

## 2020-11-18 PROCEDURE — 3074F SYST BP LT 130 MM HG: CPT | Performed by: PHYSICIAN ASSISTANT

## 2020-11-18 PROCEDURE — 99203 OFFICE O/P NEW LOW 30 MIN: CPT | Performed by: PHYSICIAN ASSISTANT

## 2020-11-18 RX ORDER — MELOXICAM 15 MG/1
15 TABLET ORAL DAILY
Qty: 30 TABLET | Refills: 0 | Status: SHIPPED | OUTPATIENT
Start: 2020-11-18 | End: 2021-02-19

## 2020-11-18 RX ORDER — CYCLOBENZAPRINE HCL 10 MG
10 TABLET ORAL 3 TIMES DAILY PRN
Qty: 60 TABLET | Refills: 0 | Status: SHIPPED | OUTPATIENT
Start: 2020-11-18

## 2020-11-18 NOTE — PROGRESS NOTES
Patient here for consult, referred by her PCP for low back pain. Patient has had back pain for years, worsening the past 6 months. Back pain is tight, stiff, spanning across whole back, but more often right sided. Pain radiates to right shoulder and knee.

## 2020-11-18 NOTE — H&P
Neurosurgery Clinic Visit  2020    Soheila Red PCP:  Carlos Marx MD    1970 MRN [id number]       CHIEF COMPLAINT:  Patient presents with:  Consult: Red by PCP  low back pain      HISTORY OF PRESENT ILLNESS:  Soheila Red is a(n tablet 3   • ALPRAZolam 0.25 MG Oral Tab Take 1 tablet (0.25 mg total) by mouth 2 (two) times daily as needed for Anxiety. 30 tablet 3   • omega-3 fatty acids 1000 MG Oral Cap Take 2 capsules by mouth daily.      • Meclizine HCl 25 MG Oral Tab Take 1 tablet care coordinator. PHYSICAL EXAMINATION:  Vital Signs:  /71   Pulse 63   Ht 66\"   Wt 300 lb (136.1 kg)   BMI 48.42 kg/m²   General: The patient is in no acute distress. HEENT: The head is atraumatic and normocephalic.   The eyes, ears, nose and th Able to heel, toe, and tandem walk. Able to heel-to-toe walk. Spine:  No neck/back pain on flexion and extension. Nontender to palpation over cervical/lumbar area without spasms. Negative Spurling's. Negative L'Hermitte's. Negative SLR.   Negative Luis Staton

## 2020-11-22 ENCOUNTER — HOSPITAL ENCOUNTER (OUTPATIENT)
Dept: GENERAL RADIOLOGY | Age: 50
Discharge: HOME | End: 2020-11-22
Attending: PHYSICIAN ASSISTANT
Payer: COMMERCIAL

## 2020-11-22 DIAGNOSIS — M47.816 LUMBAR SPONDYLOSIS: ICD-10-CM

## 2020-11-22 PROCEDURE — 72120 X-RAY BEND ONLY L-S SPINE: CPT | Performed by: PHYSICIAN ASSISTANT

## 2021-02-12 ENCOUNTER — PATIENT MESSAGE (OUTPATIENT)
Dept: INTERNAL MEDICINE CLINIC | Facility: CLINIC | Age: 51
End: 2021-02-12

## 2021-02-12 DIAGNOSIS — M25.569 KNEE PAIN, UNSPECIFIED CHRONICITY, UNSPECIFIED LATERALITY: Primary | ICD-10-CM

## 2021-02-12 NOTE — TELEPHONE ENCOUNTER
From: Moriah Mariano  To: Anahy Shoemaker MD  Sent: 2/12/2021 7:36 AM CST  Subject: Referral Request    Good Morning Dr. Joe Rya,    I have been experiencing knee pain that I think should be looked at.  Can you provide a referral for an orthopedic doctor I

## 2021-02-17 ENCOUNTER — TELEPHONE (OUTPATIENT)
Dept: ORTHOPEDICS CLINIC | Facility: CLINIC | Age: 51
End: 2021-02-17

## 2021-02-17 DIAGNOSIS — M25.562 PAIN IN BOTH KNEES, UNSPECIFIED CHRONICITY: Primary | ICD-10-CM

## 2021-02-17 DIAGNOSIS — M25.561 PAIN IN BOTH KNEES, UNSPECIFIED CHRONICITY: Primary | ICD-10-CM

## 2021-02-19 ENCOUNTER — HOSPITAL ENCOUNTER (OUTPATIENT)
Dept: GENERAL RADIOLOGY | Age: 51
Discharge: HOME | End: 2021-02-19
Attending: ORTHOPAEDIC SURGERY
Payer: COMMERCIAL

## 2021-02-19 ENCOUNTER — OFFICE VISIT (OUTPATIENT)
Dept: ORTHOPEDICS CLINIC | Facility: CLINIC | Age: 51
End: 2021-02-19
Payer: COMMERCIAL

## 2021-02-19 VITALS — BODY MASS INDEX: 45.99 KG/M2 | HEART RATE: 72 BPM | WEIGHT: 293 LBS | HEIGHT: 67 IN | OXYGEN SATURATION: 99 %

## 2021-02-19 DIAGNOSIS — M25.562 PAIN IN BOTH KNEES, UNSPECIFIED CHRONICITY: ICD-10-CM

## 2021-02-19 DIAGNOSIS — M17.0 PRIMARY OSTEOARTHRITIS OF BOTH KNEES: Primary | ICD-10-CM

## 2021-02-19 DIAGNOSIS — M25.561 PAIN IN BOTH KNEES, UNSPECIFIED CHRONICITY: ICD-10-CM

## 2021-02-19 PROCEDURE — 73564 X-RAY EXAM KNEE 4 OR MORE: CPT | Performed by: ORTHOPAEDIC SURGERY

## 2021-02-19 PROCEDURE — 99213 OFFICE O/P EST LOW 20 MIN: CPT | Performed by: ORTHOPAEDIC SURGERY

## 2021-02-19 PROCEDURE — 3008F BODY MASS INDEX DOCD: CPT | Performed by: ORTHOPAEDIC SURGERY

## 2021-02-19 RX ORDER — MELOXICAM 15 MG/1
15 TABLET ORAL DAILY
Qty: 60 TABLET | Refills: 0 | Status: SHIPPED | OUTPATIENT
Start: 2021-02-19 | End: 2021-04-20

## 2021-02-19 NOTE — H&P
[facility] New Patient Note    CC: Patient presents with:  Knee Pain: BILATERAL KNEE PAIN       HPI: This 53year old female presents today with complaints of bilateral knee pain.   Patient reports that this has been ongoing for a while but worsening mouth 3 (three) times daily as needed for Dizziness. 15 tablet 0   • losartan 100 MG Oral Tab Take 1 tablet (100 mg total) by mouth nightly. 90 tablet 1   • Levothyroxine Sodium 100 MCG Oral Tab Take 1 tablet (100 mcg total) by mouth once daily.  90 tablet knees  Bilateral lower extremity:  • Inspection: skin intact, no lesions or effusions  • Palpation: Nontender to palpation about medial and lateral joint line, patellofemoral joint, posterior aspect of the knees  • Range of motion: Hip flexion does not rep would recommend would be consideration for steroid injection.   If she does not get sufficient relief from anti-inflammatory for acute pain, as well as therapy and BMI optimization for long-term pain relief, she will call us to set up an injection only appo

## 2021-03-12 ENCOUNTER — OFFICE VISIT (OUTPATIENT)
Dept: ORTHOPEDICS CLINIC | Facility: CLINIC | Age: 51
End: 2021-03-12
Payer: COMMERCIAL

## 2021-03-12 VITALS — OXYGEN SATURATION: 98 % | HEART RATE: 68 BPM | DIASTOLIC BLOOD PRESSURE: 86 MMHG | SYSTOLIC BLOOD PRESSURE: 160 MMHG

## 2021-03-12 DIAGNOSIS — M17.12 PRIMARY OSTEOARTHRITIS OF LEFT KNEE: Primary | ICD-10-CM

## 2021-03-12 PROCEDURE — 3077F SYST BP >= 140 MM HG: CPT | Performed by: ORTHOPAEDIC SURGERY

## 2021-03-12 PROCEDURE — 20610 DRAIN/INJ JOINT/BURSA W/O US: CPT | Performed by: ORTHOPAEDIC SURGERY

## 2021-03-12 PROCEDURE — 3079F DIAST BP 80-89 MM HG: CPT | Performed by: ORTHOPAEDIC SURGERY

## 2021-03-12 RX ORDER — TRIAMCINOLONE ACETONIDE 40 MG/ML
40 INJECTION, SUSPENSION INTRA-ARTICULAR; INTRAMUSCULAR ONCE
Status: COMPLETED | OUTPATIENT
Start: 2021-03-12 | End: 2021-03-12

## 2021-03-12 RX ADMIN — TRIAMCINOLONE ACETONIDE 40 MG: 40 INJECTION, SUSPENSION INTRA-ARTICULAR; INTRAMUSCULAR at 15:21:00

## 2021-03-13 DIAGNOSIS — Z23 NEED FOR VACCINATION: ICD-10-CM

## 2021-03-16 ENCOUNTER — IMMUNIZATION (OUTPATIENT)
Dept: LAB | Age: 51
End: 2021-03-16
Attending: HOSPITALIST
Payer: COMMERCIAL

## 2021-03-16 DIAGNOSIS — Z23 NEED FOR VACCINATION: Primary | ICD-10-CM

## 2021-03-16 PROCEDURE — 0001A SARSCOV2 VAC 30MCG/0.3ML IM: CPT

## 2021-03-16 RX ORDER — AMLODIPINE BESYLATE 10 MG/1
10 TABLET ORAL NIGHTLY
Qty: 90 TABLET | Refills: 1 | Status: SHIPPED | OUTPATIENT
Start: 2021-03-16 | End: 2021-09-17

## 2021-03-16 RX ORDER — LOSARTAN POTASSIUM 100 MG/1
100 TABLET ORAL NIGHTLY
Qty: 90 TABLET | Refills: 1 | Status: SHIPPED | OUTPATIENT
Start: 2021-03-16 | End: 2021-09-07

## 2021-03-16 RX ORDER — ESCITALOPRAM OXALATE 20 MG/1
20 TABLET ORAL
Qty: 90 TABLET | Refills: 3 | Status: SHIPPED | OUTPATIENT
Start: 2021-03-16 | End: 2021-09-22

## 2021-03-16 RX ORDER — LEVOTHYROXINE SODIUM 0.1 MG/1
100 TABLET ORAL
Qty: 90 TABLET | Refills: 3 | Status: SHIPPED | OUTPATIENT
Start: 2021-03-16 | End: 2022-02-24

## 2021-03-16 NOTE — TELEPHONE ENCOUNTER
No protocol   escitalopram 20mg filled 2/27/2020 #90 3 refills     Protocol passed   Levothyroxine 100mg filled 3/19/2020 #90 3 refills     Protocol failed   Losartan 100mg filled 9/3/2020 #90 1 refill   Amlodipine 10mg filled 10/9/2020 #90 1 refill     LOV: 6/18/2020   RTC: none noted   FOV: none

## 2021-03-16 NOTE — TELEPHONE ENCOUNTER
Also please let her know she is due for 6 month follow up appt for medication check and blood pressure

## 2021-03-26 ENCOUNTER — MED REC SCAN ONLY (OUTPATIENT)
Dept: INTERNAL MEDICINE CLINIC | Facility: CLINIC | Age: 51
End: 2021-03-26

## 2021-04-06 ENCOUNTER — IMMUNIZATION (OUTPATIENT)
Dept: LAB | Age: 51
End: 2021-04-06
Attending: HOSPITALIST
Payer: COMMERCIAL

## 2021-04-06 DIAGNOSIS — Z23 NEED FOR VACCINATION: Primary | ICD-10-CM

## 2021-04-06 PROCEDURE — 0002A SARSCOV2 VAC 30MCG/0.3ML IM: CPT

## 2021-06-04 ENCOUNTER — PATIENT MESSAGE (OUTPATIENT)
Dept: INTERNAL MEDICINE CLINIC | Facility: CLINIC | Age: 51
End: 2021-06-04

## 2021-06-04 DIAGNOSIS — I10 ESSENTIAL HYPERTENSION: ICD-10-CM

## 2021-06-04 DIAGNOSIS — R73.01 ELEVATED FASTING GLUCOSE: ICD-10-CM

## 2021-06-04 DIAGNOSIS — E78.1 HYPERTRIGLYCERIDEMIA: ICD-10-CM

## 2021-06-04 DIAGNOSIS — E03.9 ACQUIRED HYPOTHYROIDISM: ICD-10-CM

## 2021-06-04 DIAGNOSIS — Z00.00 LABORATORY EXAMINATION ORDERED AS PART OF A ROUTINE GENERAL MEDICAL EXAMINATION: Primary | ICD-10-CM

## 2021-06-07 NOTE — TELEPHONE ENCOUNTER
From: Caro Hooks  To: Jeremi Wiseman MD  Sent: 6/4/2021 11:04 PM CDT  Subject: Non-Urgent Medical Question    Dr. Javed Iqbal,  I have a physical scheduled with you on Monday, June 21. Will I need blood work done?  If so, should I have it done prior to my

## 2021-06-18 DIAGNOSIS — R73.01 ELEVATED FASTING GLUCOSE: Primary | ICD-10-CM

## 2021-06-21 ENCOUNTER — OFFICE VISIT (OUTPATIENT)
Dept: INTERNAL MEDICINE CLINIC | Facility: CLINIC | Age: 51
End: 2021-06-21
Payer: COMMERCIAL

## 2021-06-21 VITALS
RESPIRATION RATE: 16 BRPM | BODY MASS INDEX: 45.99 KG/M2 | TEMPERATURE: 98 F | WEIGHT: 293 LBS | DIASTOLIC BLOOD PRESSURE: 80 MMHG | OXYGEN SATURATION: 99 % | SYSTOLIC BLOOD PRESSURE: 118 MMHG | HEIGHT: 66.75 IN | HEART RATE: 86 BPM

## 2021-06-21 DIAGNOSIS — R73.03 PRE-DIABETES: ICD-10-CM

## 2021-06-21 DIAGNOSIS — Z00.00 ENCOUNTER FOR ROUTINE ADULT MEDICAL EXAMINATION: Primary | ICD-10-CM

## 2021-06-21 DIAGNOSIS — R74.8 ELEVATED LIVER ENZYMES: ICD-10-CM

## 2021-06-21 DIAGNOSIS — I10 ESSENTIAL HYPERTENSION: ICD-10-CM

## 2021-06-21 DIAGNOSIS — E03.9 ACQUIRED HYPOTHYROIDISM: ICD-10-CM

## 2021-06-21 DIAGNOSIS — E78.00 PURE HYPERCHOLESTEROLEMIA: ICD-10-CM

## 2021-06-21 DIAGNOSIS — M17.0 PRIMARY OSTEOARTHRITIS OF BOTH KNEES: ICD-10-CM

## 2021-06-21 PROCEDURE — 3079F DIAST BP 80-89 MM HG: CPT | Performed by: INTERNAL MEDICINE

## 2021-06-21 PROCEDURE — 99396 PREV VISIT EST AGE 40-64: CPT | Performed by: INTERNAL MEDICINE

## 2021-06-21 PROCEDURE — 3074F SYST BP LT 130 MM HG: CPT | Performed by: INTERNAL MEDICINE

## 2021-06-21 PROCEDURE — 3008F BODY MASS INDEX DOCD: CPT | Performed by: INTERNAL MEDICINE

## 2021-06-21 RX ORDER — MULTIVIT-MIN/IRON/FOLIC ACID/K 18-600-40
CAPSULE ORAL
COMMUNITY

## 2021-06-21 NOTE — PROGRESS NOTES
[facility] Internal Medicine Office Note  Chief Complaint:   Patient presents with:  Physical      HPI:   This is a 53year old female coming in for physical  HPI  HTN - well controlled  On losartan 100mg and amlodipine 10mg      She has been rodriguez Mother         Diet Controlled   • Hypertension Mother    • Thyroid Disorder Mother    • Cancer Mother         Thyroid & Skin   • Cancer Maternal Grandmother         Liver   • Heart Disorder Maternal Grandfather    • Stroke Maternal Grandfather    • Diabet Constitutional: Negative for fever. HENT: Negative for congestion. Eyes: Negative for visual disturbance. Respiratory: Negative for shortness of breath. Cardiovascular: Negative for chest pain. Gastrointestinal: Negative for constipation.    G adult [facility])      The plan is as follows  Soheila was seen today for physical.    Diagnoses and all orders for this visit:    Encounter for routine adult medical examination  -mammo up to date - due 7/2021  Colonoscopy up to date  -flu shot this fall     Es complications from the treatments as a result of today.      Riley Javier MD

## 2021-06-21 NOTE — PATIENT INSTRUCTIONS
Low carb diet - decrease bread, rice, pasta, potatoes, juice, soda, alcohol, chips, crackers, desserts, candy    Repeat blood work 1 month     Call to schedule ultrasound of abdomen/liver     Cut escitalopram in half and take 10mg daily to assess side effe

## 2021-06-25 ENCOUNTER — PATIENT MESSAGE (OUTPATIENT)
Dept: INTERNAL MEDICINE CLINIC | Facility: CLINIC | Age: 51
End: 2021-06-25

## 2021-06-25 NOTE — TELEPHONE ENCOUNTER
From: Meme Evans  To: Luis Alfredo Peraza MD  Sent: 6/25/2021 10:30 AM CDT  Subject: Referral Request    I'm trying to schedule an ultrasound at [address] [place] [address] [phone] at [facility].  They advised they need a refe

## 2021-07-01 ENCOUNTER — TELEPHONE (OUTPATIENT)
Dept: INTERNAL MEDICINE CLINIC | Facility: CLINIC | Age: 51
End: 2021-07-01

## 2021-07-01 NOTE — TELEPHONE ENCOUNTER
Abdomen Complete ultrasound results (from [facility]) placed in Dr. Jessica Amaro in-box for review.

## 2021-07-20 ENCOUNTER — TELEPHONE (OUTPATIENT)
Dept: INTERNAL MEDICINE CLINIC | Facility: CLINIC | Age: 51
End: 2021-07-20

## 2021-07-20 NOTE — TELEPHONE ENCOUNTER
Incoming fax from [place] with patients mammogram results   HM updated   Placed in LS in-basket for review

## 2021-07-25 LAB
ALBUMIN/GLOBULIN RATIO: 1.5 (CALC) (ref 1–2.5)
ALBUMIN: 4.3 G/DL (ref 3.6–5.1)
ALKALINE PHOSPHATASE: 78 U/L (ref 31–125)
ALT: 53 U/L (ref 6–29)
AST: 44 U/L (ref 10–35)
BILIRUBIN, DIRECT: 0.1 MG/DL
BILIRUBIN, INDIRECT: 0.3 MG/DL (CALC) (ref 0.2–1.2)
BILIRUBIN, TOTAL: 0.4 MG/DL (ref 0.2–1.2)
GLOBULIN: 2.9 G/DL (CALC) (ref 1.9–3.7)
PROTEIN, TOTAL: 7.2 G/DL (ref 6.1–8.1)

## 2021-09-06 ENCOUNTER — PATIENT MESSAGE (OUTPATIENT)
Dept: INTERNAL MEDICINE CLINIC | Facility: CLINIC | Age: 51
End: 2021-09-06

## 2021-09-06 DIAGNOSIS — R74.8 ELEVATED LIVER ENZYMES: Primary | ICD-10-CM

## 2021-09-07 RX ORDER — LOSARTAN POTASSIUM 100 MG/1
TABLET ORAL
Qty: 90 TABLET | Refills: 0 | Status: SHIPPED | OUTPATIENT
Start: 2021-09-07 | End: 2022-01-05

## 2021-09-07 NOTE — TELEPHONE ENCOUNTER
Patient had blood work done on 6/17/21     Besides her a1c, does patient need any other blood work   Please advise

## 2021-09-07 NOTE — TELEPHONE ENCOUNTER
From: Yosi Garcia  To: Taniya Reina MD  Sent: 9/6/2021 7:26 PM CDT  Subject: Other    Hi Dr. Marlin Morales,  For my visit on Sep 22, I do need to get blood work done, correct? If so, how far in advance of the appointment do I need to have done?  Do I need

## 2021-09-13 RX ORDER — ALPRAZOLAM 0.25 MG/1
0.25 TABLET ORAL 2 TIMES DAILY PRN
Qty: 30 TABLET | Refills: 3 | Status: SHIPPED | OUTPATIENT
Start: 2021-09-13

## 2021-09-15 ENCOUNTER — OFFICE VISIT (OUTPATIENT)
Dept: SURGERY | Facility: CLINIC | Age: 51
End: 2021-09-15
Payer: COMMERCIAL

## 2021-09-15 VITALS
SYSTOLIC BLOOD PRESSURE: 147 MMHG | DIASTOLIC BLOOD PRESSURE: 86 MMHG | BODY MASS INDEX: 49 KG/M2 | WEIGHT: 293 LBS | OXYGEN SATURATION: 95 % | RESPIRATION RATE: 18 BRPM | HEART RATE: 84 BPM | TEMPERATURE: 98 F

## 2021-09-15 DIAGNOSIS — R79.89 ELEVATED LIVER FUNCTION TESTS: Primary | ICD-10-CM

## 2021-09-15 NOTE — PROGRESS NOTES
[facility]  [address]  [address]  [phone] ([phone]) Sister       Social History    Tobacco Use      Smoking status: Never Smoker      Smokeless tobacco: Never Used    Vaping Use      Vaping Use: Never used    Alcohol use: Yes      Comment: RARE    Drug use: No         Current Outpatient Medications:   •  AL assess at future visit possible elastography versus ELF test given recent approval.   - Follow up 6 months     Carl Gilbert MD  Director of Hepatology  Medical Director of [address] [place] [address] of [facility] [place] [address]

## 2021-09-17 RX ORDER — AMLODIPINE BESYLATE 10 MG/1
TABLET ORAL
Qty: 90 TABLET | Refills: 0 | Status: SHIPPED | OUTPATIENT
Start: 2021-09-17 | End: 2021-12-18

## 2021-09-17 NOTE — TELEPHONE ENCOUNTER
Protocol passed     Requesting: amlodipine 10mg     LOV: 6/21/21   RTC: 3 months   Filled: 3/16/21 #90 1 refill   Recent Labs: 6/17/21     Upcoming OV : 9/22/21

## 2021-09-18 PROCEDURE — 3044F HG A1C LEVEL LT 7.0%: CPT | Performed by: INTERNAL MEDICINE

## 2021-09-19 LAB
ALBUMIN/GLOBULIN RATIO: 1.4 (CALC) (ref 1–2.5)
ALBUMIN: 4.2 G/DL (ref 3.6–5.1)
ALKALINE PHOSPHATASE: 76 U/L (ref 37–153)
ALT: 49 U/L (ref 6–29)
AST: 43 U/L (ref 10–35)
BILIRUBIN, DIRECT: 0.1 MG/DL
BILIRUBIN, INDIRECT: 0.4 MG/DL (CALC) (ref 0.2–1.2)
BILIRUBIN, TOTAL: 0.5 MG/DL (ref 0.2–1.2)
GLOBULIN: 3 G/DL (CALC) (ref 1.9–3.7)
HEMOGLOBIN A1C: 6.4 % OF TOTAL HGB
PROTEIN, TOTAL: 7.2 G/DL (ref 6.1–8.1)

## 2021-09-22 ENCOUNTER — OFFICE VISIT (OUTPATIENT)
Dept: INTERNAL MEDICINE CLINIC | Facility: CLINIC | Age: 51
End: 2021-09-22
Payer: COMMERCIAL

## 2021-09-22 VITALS
SYSTOLIC BLOOD PRESSURE: 150 MMHG | OXYGEN SATURATION: 98 % | DIASTOLIC BLOOD PRESSURE: 84 MMHG | HEART RATE: 82 BPM | BODY MASS INDEX: 45.99 KG/M2 | TEMPERATURE: 99 F | RESPIRATION RATE: 18 BRPM | HEIGHT: 66.75 IN | WEIGHT: 293 LBS

## 2021-09-22 DIAGNOSIS — R74.8 ELEVATED LIVER ENZYMES: ICD-10-CM

## 2021-09-22 DIAGNOSIS — R73.01 ELEVATED FASTING GLUCOSE: ICD-10-CM

## 2021-09-22 DIAGNOSIS — I10 ESSENTIAL HYPERTENSION: Primary | ICD-10-CM

## 2021-09-22 PROCEDURE — 3077F SYST BP >= 140 MM HG: CPT | Performed by: INTERNAL MEDICINE

## 2021-09-22 PROCEDURE — 3008F BODY MASS INDEX DOCD: CPT | Performed by: INTERNAL MEDICINE

## 2021-09-22 PROCEDURE — 99214 OFFICE O/P EST MOD 30 MIN: CPT | Performed by: INTERNAL MEDICINE

## 2021-09-22 PROCEDURE — 3079F DIAST BP 80-89 MM HG: CPT | Performed by: INTERNAL MEDICINE

## 2021-09-22 RX ORDER — ESCITALOPRAM OXALATE 10 MG/1
10 TABLET ORAL DAILY
Qty: 90 TABLET | Refills: 3 | Status: SHIPPED | OUTPATIENT
Start: 2021-09-22

## 2021-09-22 NOTE — PATIENT INSTRUCTIONS
Start metformin once daily for the first week and then increase to twice daily if doing well from a side effect perspective     Decrease carbs in the diet - decrease bread, rice, pasta, potatoes, juice, soda, alcohol, crackers, chips, tortillas, candy, declan

## 2021-09-22 NOTE — PROGRESS NOTES
[facility] Internal Medicine Office Note  Chief Complaint:   Patient presents with:   Follow - Up      HPI:   This is a 49year old female coming in for HTN and pre-diabetes  HPI  Elevated liver enzymes; has been stable   She saw Dr. Bobby Grandchild and had Grandmother    • Heart Attack Paternal Grandmother    • Stroke Paternal Grandfather    • Other (Brain Tumor) Paternal Grandfather    • Infectious Disease Sister    • Other (Prothrombin Gene Mutation) Sister    • Other (Blood Clot) Sister    • Other (Prothr Psychiatric/Behavioral: Negative.          EXAM:   /84   Pulse 82   Temp 98.7 °F (37.1 °C) (Oral)   Resp 18   Ht 5' 6.75\" (1.695 m)   Wt (!) 311 lb 6.4 oz (141.3 kg)   SpO2 98%   BMI 49.14 kg/m²  Estimated body mass index is 49.14 kg/m² as calculat through his office      BMI 45.0-49.9, adult [facility]) - she has appt scheduled with weight loss clinic and will benefit from multidisciplinary psychologist and dietitian support. She noted being overwhelmed with different diets - focus on low carb diet.

## 2021-10-06 ENCOUNTER — OFFICE VISIT (OUTPATIENT)
Dept: INTERNAL MEDICINE CLINIC | Facility: CLINIC | Age: 51
End: 2021-10-06
Payer: COMMERCIAL

## 2021-10-06 VITALS
BODY MASS INDEX: 47.09 KG/M2 | WEIGHT: 293 LBS | HEART RATE: 78 BPM | RESPIRATION RATE: 16 BRPM | DIASTOLIC BLOOD PRESSURE: 90 MMHG | SYSTOLIC BLOOD PRESSURE: 160 MMHG | HEIGHT: 66 IN

## 2021-10-06 DIAGNOSIS — R06.81 APNEA: ICD-10-CM

## 2021-10-06 DIAGNOSIS — I10 ESSENTIAL HYPERTENSION: Primary | ICD-10-CM

## 2021-10-06 DIAGNOSIS — F41.9 ANXIETY AND DEPRESSION: ICD-10-CM

## 2021-10-06 DIAGNOSIS — E78.1 HYPERTRIGLYCERIDEMIA: ICD-10-CM

## 2021-10-06 DIAGNOSIS — E66.01 MORBID OBESITY (HCC): ICD-10-CM

## 2021-10-06 DIAGNOSIS — Z51.81 THERAPEUTIC DRUG MONITORING: ICD-10-CM

## 2021-10-06 DIAGNOSIS — E11.9 DIET-CONTROLLED DIABETES MELLITUS (HCC): ICD-10-CM

## 2021-10-06 DIAGNOSIS — F32.A ANXIETY AND DEPRESSION: ICD-10-CM

## 2021-10-06 DIAGNOSIS — R06.83 SNORING: ICD-10-CM

## 2021-10-06 DIAGNOSIS — R73.01 ELEVATED FASTING GLUCOSE: ICD-10-CM

## 2021-10-06 PROCEDURE — 3080F DIAST BP >= 90 MM HG: CPT | Performed by: INTERNAL MEDICINE

## 2021-10-06 PROCEDURE — 3077F SYST BP >= 140 MM HG: CPT | Performed by: INTERNAL MEDICINE

## 2021-10-06 PROCEDURE — 3008F BODY MASS INDEX DOCD: CPT | Performed by: INTERNAL MEDICINE

## 2021-10-06 PROCEDURE — 99204 OFFICE O/P NEW MOD 45 MIN: CPT | Performed by: INTERNAL MEDICINE

## 2021-10-06 RX ORDER — SEMAGLUTIDE 1.34 MG/ML
0.5 INJECTION, SOLUTION SUBCUTANEOUS WEEKLY
Qty: 1 EACH | Refills: 0 | Status: SHIPPED | OUTPATIENT
Start: 2021-10-06

## 2021-10-06 RX ORDER — SEMAGLUTIDE 1.34 MG/ML
0.25 INJECTION, SOLUTION SUBCUTANEOUS
Qty: 1 EACH | Refills: 0 | COMMUNITY
Start: 2021-10-06 | End: 2021-11-03

## 2021-10-06 NOTE — PROGRESS NOTES
Patient teaching on Ozempic performed. Patient demonstrated back, all questions were answered and patient verbalized understanding.  RAMON

## 2021-10-06 NOTE — PATIENT INSTRUCTIONS
Factor 75   Freshly   Premier protein   Power crunch  Fair life       Plan:  Continue with medications:   Go to the lab for blood work   Follow up with me in 1 month  Schedule follow up appointments: Anastasia Jc (dietitian) or Roma Betancourt (presurgery seeds soaked in water or almond milk  -soy nuts  -cured meats (monitor for sodium issues)   -hummus with vegetables  -bean dip with vegetables    FRUIT  Low carb fruit options   Raspberries: Half a cup (60 grams) contains 3 grams of carbs.   Blackberries: H

## 2021-10-06 NOTE — PROGRESS NOTES
HISTORY OF PRESENT ILLNESS  Patient presents with:  Weight Problem: ref by Parag Peraza and PCP, tried Lifestye at [facility], and Phentermine 2007 and 2009      Soheila Pisano is a 49year old female new to our office today for initiation of medical weight lo disease: negative   Diabetes: YES   Thyroid disease: negative   Constipation: negative  DVT: negative    Family or personal history of Pancreatic issues / Medullary Thyroid Cancer: negative    History of bariatric surgery: negative     [address] reviewed: obesit GFRAA 95 06/17/2021    CA 9.7 06/17/2021    OSMOCALC 284 02/06/2020    ALKPHO 76 09/18/2021    AST 43 (H) 09/18/2021    ALT 49 (H) 09/18/2021    BILT 0.5 09/18/2021    TP 7.2 09/18/2021    ALB 4.2 09/18/2021    GLOBULT 3.0 09/18/2021    GLOBULIN 4.4 02/06/ facility-administered medications on file prior to visit.       ASSESSMENT  Initial Weight Data and Goal Weight Loss:       Diagnoses and all orders for this visit:    Essential hypertension    Elevated fasting glucose    Therapeutic drug monitoring bars  Power crunch bars   Siggi yogurt (9% milk fat)   [place] balanced breaks (cheese and nuts)- without chocolate   5.  Reduce carbohydrates which includes sweets as well as rice, pasta, potatoes, bread, corn and instead choose whole grain options or mor about 6 weeks (around 11/17/2021). Patient verbalizes understanding.     Nj Hoyos MD

## 2021-10-08 ENCOUNTER — ORDER TRANSCRIPTION (OUTPATIENT)
Dept: SLEEP CENTER | Age: 51
End: 2021-10-08

## 2021-10-08 DIAGNOSIS — G47.33 OSA (OBSTRUCTIVE SLEEP APNEA): Primary | ICD-10-CM

## 2021-10-22 ENCOUNTER — IMMUNIZATION (OUTPATIENT)
Dept: LAB | Facility: HOSPITAL | Age: 51
End: 2021-10-22
Attending: EMERGENCY MEDICINE
Payer: COMMERCIAL

## 2021-10-22 DIAGNOSIS — Z23 NEED FOR VACCINATION: Primary | ICD-10-CM

## 2021-10-22 PROCEDURE — 0003A SARSCOV2 VAC 30MCG/0.3ML IM: CPT

## 2021-11-03 ENCOUNTER — OFFICE VISIT (OUTPATIENT)
Dept: INTERNAL MEDICINE CLINIC | Facility: CLINIC | Age: 51
End: 2021-11-03
Payer: COMMERCIAL

## 2021-11-03 VITALS
DIASTOLIC BLOOD PRESSURE: 80 MMHG | SYSTOLIC BLOOD PRESSURE: 122 MMHG | BODY MASS INDEX: 47.09 KG/M2 | WEIGHT: 293 LBS | RESPIRATION RATE: 16 BRPM | HEIGHT: 66 IN | HEART RATE: 78 BPM

## 2021-11-03 DIAGNOSIS — E78.1 HYPERTRIGLYCERIDEMIA: ICD-10-CM

## 2021-11-03 DIAGNOSIS — I10 ESSENTIAL HYPERTENSION: ICD-10-CM

## 2021-11-03 DIAGNOSIS — E66.01 MORBID OBESITY (HCC): ICD-10-CM

## 2021-11-03 DIAGNOSIS — E53.8 B12 DEFICIENCY: ICD-10-CM

## 2021-11-03 DIAGNOSIS — E53.8 FOLIC ACID DEFICIENCY: ICD-10-CM

## 2021-11-03 DIAGNOSIS — Z51.81 THERAPEUTIC DRUG MONITORING: Primary | ICD-10-CM

## 2021-11-03 PROCEDURE — 96372 THER/PROPH/DIAG INJ SC/IM: CPT | Performed by: INTERNAL MEDICINE

## 2021-11-03 PROCEDURE — 99214 OFFICE O/P EST MOD 30 MIN: CPT | Performed by: INTERNAL MEDICINE

## 2021-11-03 PROCEDURE — 3008F BODY MASS INDEX DOCD: CPT | Performed by: INTERNAL MEDICINE

## 2021-11-03 PROCEDURE — 3074F SYST BP LT 130 MM HG: CPT | Performed by: INTERNAL MEDICINE

## 2021-11-03 PROCEDURE — 3079F DIAST BP 80-89 MM HG: CPT | Performed by: INTERNAL MEDICINE

## 2021-11-03 RX ORDER — CYANOCOBALAMIN 1000 UG/ML
1000 INJECTION INTRAMUSCULAR; SUBCUTANEOUS ONCE
Status: COMPLETED | OUTPATIENT
Start: 2021-11-03 | End: 2021-11-03

## 2021-11-03 RX ORDER — FOLIC ACID 1 MG/1
1 TABLET ORAL DAILY
Qty: 90 TABLET | Refills: 3 | Status: SHIPPED | OUTPATIENT
Start: 2021-11-03

## 2021-11-03 RX ORDER — SEMAGLUTIDE 1.34 MG/ML
0.5 INJECTION, SOLUTION SUBCUTANEOUS
Qty: 1 EACH | Refills: 2 | Status: SHIPPED | OUTPATIENT
Start: 2021-11-03 | End: 2021-12-01

## 2021-11-03 RX ADMIN — CYANOCOBALAMIN 1000 MCG: 1000 INJECTION INTRAMUSCULAR; SUBCUTANEOUS at 10:08:00

## 2021-11-03 NOTE — PROGRESS NOTES
HISTORY OF PRESENT ILLNESS  Patient presents with:  Weight Check: down 11 lb      Soheila Ba is a 49year old female here for follow up in medical weight loss program.     Denies chest pain, shortness of breath, dizziness, blurred vision, headache, MCHC 33.0 06/17/2021    RDW 12.6 06/17/2021     06/17/2021     Lab Results   Component Value Date     (H) 06/17/2021    BUN 14 06/17/2021    BUNCREA 9.6 (L) 02/06/2020    CREATSERUM 0.84 06/17/2021    ANIONGAP 4 02/06/2020    GFR 87 09/01/ total) by mouth once daily. , Disp: 90 tablet, Rfl: 3  cyclobenzaprine 10 MG Oral Tab, Take 1 tablet (10 mg total) by mouth 3 (three) times daily as needed for Muscle spasms. , Disp: 60 tablet, Rfl: 0  omega-3 fatty acids 1000 MG Oral Cap, Take 2 capsules by weight loss)   · Weight Loss consent to treat reviewed and signed     There are no Patient Instructions on file for this visit. Return in about 8 weeks (around 12/29/2021). Patient verbalizes understanding.     Paula Loera MD

## 2021-12-17 ENCOUNTER — TELEPHONE (OUTPATIENT)
Dept: INTERNAL MEDICINE CLINIC | Facility: CLINIC | Age: 51
End: 2021-12-17

## 2021-12-17 DIAGNOSIS — E78.1 HYPERTRIGLYCERIDEMIA: Primary | ICD-10-CM

## 2021-12-17 DIAGNOSIS — R73.03 PRE-DIABETES: ICD-10-CM

## 2021-12-17 NOTE — TELEPHONE ENCOUNTER
Pt called stating her 3 month f/u appt with LS got canceled because she is leaving for maternity leave.  Pt states LS wanted her to follow up because she wanted her to do an a1c lab test. Pt would like to know if she can get an order for her a1c lab test fo

## 2021-12-18 RX ORDER — AMLODIPINE BESYLATE 10 MG/1
TABLET ORAL
Qty: 90 TABLET | Refills: 0 | Status: SHIPPED | OUTPATIENT
Start: 2021-12-18

## 2021-12-20 ENCOUNTER — OFFICE VISIT (OUTPATIENT)
Dept: INTERNAL MEDICINE CLINIC | Facility: CLINIC | Age: 51
End: 2021-12-20
Payer: COMMERCIAL

## 2021-12-20 VITALS
HEART RATE: 78 BPM | RESPIRATION RATE: 16 BRPM | WEIGHT: 293 LBS | HEIGHT: 66 IN | DIASTOLIC BLOOD PRESSURE: 78 MMHG | BODY MASS INDEX: 47.09 KG/M2 | SYSTOLIC BLOOD PRESSURE: 138 MMHG

## 2021-12-20 DIAGNOSIS — E53.8 B12 DEFICIENCY: ICD-10-CM

## 2021-12-20 DIAGNOSIS — R07.9 CHEST PAIN, UNSPECIFIED TYPE: ICD-10-CM

## 2021-12-20 DIAGNOSIS — F41.9 ANXIETY AND DEPRESSION: ICD-10-CM

## 2021-12-20 DIAGNOSIS — F32.A ANXIETY AND DEPRESSION: ICD-10-CM

## 2021-12-20 DIAGNOSIS — I10 ESSENTIAL HYPERTENSION: ICD-10-CM

## 2021-12-20 DIAGNOSIS — R73.03 PRE-DIABETES: ICD-10-CM

## 2021-12-20 DIAGNOSIS — Z51.81 THERAPEUTIC DRUG MONITORING: Primary | ICD-10-CM

## 2021-12-20 PROCEDURE — 3008F BODY MASS INDEX DOCD: CPT | Performed by: INTERNAL MEDICINE

## 2021-12-20 PROCEDURE — 93000 ELECTROCARDIOGRAM COMPLETE: CPT | Performed by: INTERNAL MEDICINE

## 2021-12-20 PROCEDURE — 3075F SYST BP GE 130 - 139MM HG: CPT | Performed by: INTERNAL MEDICINE

## 2021-12-20 PROCEDURE — 3078F DIAST BP <80 MM HG: CPT | Performed by: INTERNAL MEDICINE

## 2021-12-20 PROCEDURE — 99214 OFFICE O/P EST MOD 30 MIN: CPT | Performed by: INTERNAL MEDICINE

## 2021-12-20 NOTE — TELEPHONE ENCOUNTER
Let her know I placed an order for A1c.  If A1c is at 6.5 or above, she will need follow up appointment, otherwise ok to wait for follow up until I'm back in April 2022

## 2021-12-20 NOTE — PROGRESS NOTES
HISTORY OF PRESENT ILLNESS  Patient presents with:  Weight Check: down 4 lb, Ozmepic 0.25 mg      Soheila Prema Sullivan is a 49year old female here for follow up in medical weight loss program.     Denies chest pain, shortness of breath, dizziness, blurred v 06/17/2021    MCV 90.2 06/17/2021    MCH 29.8 06/17/2021    MCHC 33.0 06/17/2021    RDW 12.6 06/17/2021     06/17/2021     Lab Results   Component Value Date     (H) 06/17/2021    BUN 14 06/17/2021    BUNCREA NOT APPLICABLE 42/10/4967    CREA BY MOUTH EVERY DAY AT NIGHT, Disp: 90 tablet, Rfl: 0  Cholecalciferol (VITAMIN D) 50 MCG (2000 UT) Oral Cap, Take by mouth., Disp: , Rfl:   Levothyroxine Sodium 100 MCG Oral Tab, Take 1 tablet (100 mcg total) by mouth once daily. , Disp: 90 tablet, Rfl: 3 loss)   · Weight Loss consent to treat reviewed and signed     There are no Patient Instructions on file for this visit. No follow-ups on file. Patient verbalizes understanding.     Vida Begum MD      Answers for HPI/ROS submitted by the patient on 12

## 2021-12-21 RX ORDER — SEMAGLUTIDE 1.34 MG/ML
0.5 INJECTION, SOLUTION SUBCUTANEOUS WEEKLY
Qty: 1 EACH | Refills: 0 | Status: SHIPPED | OUTPATIENT
Start: 2021-12-21

## 2022-01-05 ENCOUNTER — PATIENT MESSAGE (OUTPATIENT)
Dept: INTERNAL MEDICINE CLINIC | Facility: CLINIC | Age: 52
End: 2022-01-05

## 2022-01-05 RX ORDER — LOSARTAN POTASSIUM 100 MG/1
100 TABLET ORAL NIGHTLY
Qty: 90 TABLET | Refills: 0 | Status: SHIPPED | OUTPATIENT
Start: 2022-01-05

## 2022-01-05 NOTE — TELEPHONE ENCOUNTER
Edwin Gordillo LPN 8/6/4952 90:90 AM CST      ----- Message -----  From: Tobin Norman  Sent: 1/5/2022 10:32 AM CST  To: [facility] Clinical Staff  Subject: Medication refill request     Good Morning,   I am requesting a refill on my Losartan.  I was Jhon Nunez

## 2022-01-05 NOTE — TELEPHONE ENCOUNTER
Last refill.  She is due for labs (I ordered BMP) and Carl christianson ordered several labs in 9/2021

## 2022-01-05 NOTE — TELEPHONE ENCOUNTER
LOV: 9/22/2021 with Dr. Camryn Acuna  RTC: no follow-up on file  Last Relevant Labs: 12/21/2021 (A1C)  Filled: 9/7/2021    #90 with 0 refills    Future Appointments   Date Time Provider Shannan Dubon   2/23/2022  9:40 AM Delphine Felton MD [facility]

## 2022-01-10 ENCOUNTER — TELEPHONE (OUTPATIENT)
Dept: INTERNAL MEDICINE CLINIC | Facility: CLINIC | Age: 52
End: 2022-01-10

## 2022-01-10 LAB
ALBUMIN/GLOBULIN RATIO: 1.4
ALBUMIN: 4.2 G/DL
ALKALINE PHOSPHATASE: 76
ALT: 49
ANA SCREEN, IFA: NEGATIVE
AST: 43
BILIRUBIN, DIRECT: 0.1 MG/DL
BILIRUBIN, INDIRECT: 0.4 MG/DL
BILIRUBIN, TOTAL: 0.5 MG/DL
FERRITIN: 269
GLOBULIN: 3
HEMOGLOBIN A1C: 6.4
IMMUNOGLOBULIN A: 245
IMMUNOGLOBULIN G: 1216
IMMUNOGLOBULIN M: 95
MITOCHONDRIAL AB SCREEN: NEGATIVE
PROTEIN, TOTAL: 7.2

## 2022-01-18 ENCOUNTER — LAB ENCOUNTER (OUTPATIENT)
Dept: LAB | Age: 52
End: 2022-01-18
Attending: INTERNAL MEDICINE
Payer: COMMERCIAL

## 2022-01-18 DIAGNOSIS — I10 ESSENTIAL HYPERTENSION: ICD-10-CM

## 2022-01-18 DIAGNOSIS — E53.8 B12 DEFICIENCY: ICD-10-CM

## 2022-01-18 DIAGNOSIS — F32.A ANXIETY AND DEPRESSION: ICD-10-CM

## 2022-01-18 DIAGNOSIS — R73.03 PRE-DIABETES: ICD-10-CM

## 2022-01-18 DIAGNOSIS — Z51.81 THERAPEUTIC DRUG MONITORING: ICD-10-CM

## 2022-01-18 DIAGNOSIS — R07.9 CHEST PAIN, UNSPECIFIED TYPE: ICD-10-CM

## 2022-01-18 DIAGNOSIS — F41.9 ANXIETY AND DEPRESSION: ICD-10-CM

## 2022-01-19 LAB — SARS-COV-2 RNA RESP QL NAA+PROBE: NOT DETECTED

## 2022-01-21 ENCOUNTER — HOSPITAL ENCOUNTER (OUTPATIENT)
Dept: CV DIAGNOSTICS | Facility: HOSPITAL | Age: 52
Discharge: HOME | End: 2022-01-21
Attending: INTERNAL MEDICINE
Payer: COMMERCIAL

## 2022-01-21 DIAGNOSIS — E53.8 B12 DEFICIENCY: ICD-10-CM

## 2022-01-21 DIAGNOSIS — R73.03 PRE-DIABETES: ICD-10-CM

## 2022-01-21 DIAGNOSIS — R07.9 CHEST PAIN, UNSPECIFIED TYPE: ICD-10-CM

## 2022-01-21 DIAGNOSIS — I10 ESSENTIAL HYPERTENSION: ICD-10-CM

## 2022-01-21 DIAGNOSIS — Z51.81 THERAPEUTIC DRUG MONITORING: ICD-10-CM

## 2022-01-21 DIAGNOSIS — F32.A ANXIETY AND DEPRESSION: ICD-10-CM

## 2022-01-21 DIAGNOSIS — F41.9 ANXIETY AND DEPRESSION: ICD-10-CM

## 2022-01-21 PROCEDURE — 93350 STRESS TTE ONLY: CPT | Performed by: INTERNAL MEDICINE

## 2022-01-21 PROCEDURE — 93017 CV STRESS TEST TRACING ONLY: CPT | Performed by: INTERNAL MEDICINE

## 2022-01-21 PROCEDURE — 93018 CV STRESS TEST I&R ONLY: CPT | Performed by: INTERNAL MEDICINE

## 2022-01-23 ENCOUNTER — PATIENT MESSAGE (OUTPATIENT)
Dept: INTERNAL MEDICINE CLINIC | Facility: CLINIC | Age: 52
End: 2022-01-23

## 2022-01-24 NOTE — TELEPHONE ENCOUNTER
LOV: 9/22/2021 with Dr. Brunilda Hernandez  RTC: \"December 18 or later for follow up appointment\"  Last Relevant Labs: 9/18/2021  Filled: 11/3/2021   #90 with 0 refills    Future Appointments   Date Time Provider Shannan Dubon   2/23/2022  9:40 AM James Bob MD

## 2022-01-24 NOTE — TELEPHONE ENCOUNTER
From: Jamarcus Villarreal  To: Sapna Olguin MD  Sent: 1/23/2022 8:05 PM CST  Subject: Metformin refill    I have about 10 days left of my Metformin. I was going to refill the prescription but noticed I don't have a refill.  I'm not positive if Dr. Ruben Rowan on

## 2022-02-23 ENCOUNTER — OFFICE VISIT (OUTPATIENT)
Dept: INTERNAL MEDICINE CLINIC | Facility: CLINIC | Age: 52
End: 2022-02-23
Payer: COMMERCIAL

## 2022-02-23 VITALS
DIASTOLIC BLOOD PRESSURE: 78 MMHG | BODY MASS INDEX: 46.28 KG/M2 | WEIGHT: 288 LBS | RESPIRATION RATE: 16 BRPM | HEIGHT: 66 IN | HEART RATE: 88 BPM | SYSTOLIC BLOOD PRESSURE: 130 MMHG

## 2022-02-23 DIAGNOSIS — F32.A ANXIETY AND DEPRESSION: ICD-10-CM

## 2022-02-23 DIAGNOSIS — F41.9 ANXIETY AND DEPRESSION: ICD-10-CM

## 2022-02-23 DIAGNOSIS — E53.8 B12 DEFICIENCY: ICD-10-CM

## 2022-02-23 DIAGNOSIS — R07.9 CHEST PAIN, UNSPECIFIED TYPE: ICD-10-CM

## 2022-02-23 DIAGNOSIS — Z51.81 THERAPEUTIC DRUG MONITORING: Primary | ICD-10-CM

## 2022-02-23 PROCEDURE — 99214 OFFICE O/P EST MOD 30 MIN: CPT | Performed by: INTERNAL MEDICINE

## 2022-02-23 PROCEDURE — 3075F SYST BP GE 130 - 139MM HG: CPT | Performed by: INTERNAL MEDICINE

## 2022-02-23 PROCEDURE — 3078F DIAST BP <80 MM HG: CPT | Performed by: INTERNAL MEDICINE

## 2022-02-23 PROCEDURE — 3008F BODY MASS INDEX DOCD: CPT | Performed by: INTERNAL MEDICINE

## 2022-02-24 RX ORDER — LEVOTHYROXINE SODIUM 0.1 MG/1
TABLET ORAL
Qty: 90 TABLET | Refills: 0 | Status: SHIPPED | OUTPATIENT
Start: 2022-02-24

## 2022-02-24 NOTE — TELEPHONE ENCOUNTER
LOV: 9/22/2021 with Dr. Tatyana Chen  RTC: \"December 25 or later for follow up appointment\"  Last Relevant Labs: 12/21/2021 (A1C)  Filled: 3/16/2021     #90 with 3 refills    Future Appointments   Date Time Provider Shannan Dubon   3/16/2022  9:30 AM Nickolas Tran MD [facility]   4/13/2022  9:40 AM Anu Lomeli MD [address]   6/21/2022  9:40 AM [facility]

## 2022-03-09 LAB
ALBUMIN/GLOBULIN RATIO: 1.3 (CALC) (ref 1–2.5)
ALBUMIN: 4.3 G/DL (ref 3.6–5.1)
ALKALINE PHOSPHATASE: 82 U/L (ref 37–153)
ALT: 42 U/L (ref 6–29)
AST: 30 U/L (ref 10–35)
BILIRUBIN, DIRECT: 0.1 MG/DL
BILIRUBIN, INDIRECT: 0.3 MG/DL (CALC) (ref 0.2–1.2)
BILIRUBIN, TOTAL: 0.4 MG/DL (ref 0.2–1.2)
BUN: 17 MG/DL (ref 7–25)
CALCIUM: 9.9 MG/DL (ref 8.6–10.4)
CHLORIDE: 102 MMOL/L (ref 98–110)
CHOLESTEROL, TOTAL: 189 MG/DL
CREATININE: 0.88 MG/DL (ref 0.5–1.05)
EGFR IF AFRICN AM: 89 ML/MIN/1.73M2
EGFR IF NONAFRICN AM: 77 ML/MIN/1.73M2
GLOBULIN: 3.3 G/DL (CALC) (ref 1.9–3.7)
GLUCOSE: 110 MG/DL (ref 65–99)
HDL CHOLESTEROL: 44 MG/DL
LDL-CHOLESTEROL: 119 MG/DL (CALC)
NON-HDL CHOLESTEROL: 145 MG/DL (CALC)
POTASSIUM: 4.8 MMOL/L (ref 3.5–5.3)
PROTEIN, TOTAL: 7.6 G/DL (ref 6.1–8.1)
SODIUM: 137 MMOL/L (ref 135–146)
TRIGLYCERIDES: 149 MG/DL
TSH: 0.72 MIU/L

## 2022-03-16 ENCOUNTER — OFFICE VISIT (OUTPATIENT)
Dept: SURGERY | Facility: CLINIC | Age: 52
End: 2022-03-16
Payer: COMMERCIAL

## 2022-03-16 VITALS
TEMPERATURE: 97 F | BODY MASS INDEX: 47 KG/M2 | OXYGEN SATURATION: 99 % | HEART RATE: 76 BPM | RESPIRATION RATE: 18 BRPM | WEIGHT: 289 LBS | SYSTOLIC BLOOD PRESSURE: 121 MMHG | DIASTOLIC BLOOD PRESSURE: 79 MMHG

## 2022-03-16 DIAGNOSIS — K76.0 NAFLD (NONALCOHOLIC FATTY LIVER DISEASE): Primary | ICD-10-CM

## 2022-03-16 NOTE — PROGRESS NOTES
Faxed patient's lab orders to [facility] ordered by Dr. Alverto Vela dated 9/15/2021. [facility] said they were missing a diagnosis code. Patient was also given a copy of the lab orders. Fax was successful. Fax confirmation scanned into media.

## 2022-04-05 RX ORDER — LOSARTAN POTASSIUM 100 MG/1
100 TABLET ORAL NIGHTLY
Qty: 90 TABLET | Refills: 3 | Status: SHIPPED | OUTPATIENT
Start: 2022-04-05

## 2022-04-05 NOTE — TELEPHONE ENCOUNTER
Requesting metformin 500 mg  LOV: 2/23/22  RTC: 8 weeks  Last Relevant Labs: 12/21/21  Filled: 1/25/22 #90 with 0 refills    Future Appointments   Date Time Provider Shannan Dubon   4/13/2022  9:40 AM [facility]al [facility]

## 2022-04-13 ENCOUNTER — OFFICE VISIT (OUTPATIENT)
Dept: INTERNAL MEDICINE CLINIC | Facility: CLINIC | Age: 52
End: 2022-04-13
Payer: COMMERCIAL

## 2022-04-13 VITALS
RESPIRATION RATE: 16 BRPM | WEIGHT: 283 LBS | BODY MASS INDEX: 45.48 KG/M2 | SYSTOLIC BLOOD PRESSURE: 118 MMHG | DIASTOLIC BLOOD PRESSURE: 78 MMHG | HEIGHT: 66 IN | HEART RATE: 78 BPM

## 2022-04-13 DIAGNOSIS — F41.9 ANXIETY AND DEPRESSION: ICD-10-CM

## 2022-04-13 DIAGNOSIS — F32.A ANXIETY AND DEPRESSION: ICD-10-CM

## 2022-04-13 DIAGNOSIS — I10 ESSENTIAL HYPERTENSION: ICD-10-CM

## 2022-04-13 DIAGNOSIS — Z51.81 THERAPEUTIC DRUG MONITORING: Primary | ICD-10-CM

## 2022-04-13 PROCEDURE — 3008F BODY MASS INDEX DOCD: CPT | Performed by: INTERNAL MEDICINE

## 2022-04-13 PROCEDURE — 99214 OFFICE O/P EST MOD 30 MIN: CPT | Performed by: INTERNAL MEDICINE

## 2022-04-13 PROCEDURE — 3074F SYST BP LT 130 MM HG: CPT | Performed by: INTERNAL MEDICINE

## 2022-04-13 PROCEDURE — 3078F DIAST BP <80 MM HG: CPT | Performed by: INTERNAL MEDICINE

## 2022-05-23 RX ORDER — AMLODIPINE BESYLATE 10 MG/1
TABLET ORAL
Qty: 90 TABLET | Refills: 0 | Status: SHIPPED | OUTPATIENT
Start: 2022-05-23

## 2022-06-17 ENCOUNTER — PATIENT MESSAGE (OUTPATIENT)
Dept: INTERNAL MEDICINE CLINIC | Facility: CLINIC | Age: 52
End: 2022-06-17

## 2022-06-17 DIAGNOSIS — E11.9 DIET-CONTROLLED DIABETES MELLITUS (HCC): Primary | ICD-10-CM

## 2022-06-17 NOTE — TELEPHONE ENCOUNTER
Dr. Wing Garibay ordered labs on 3/2022, not sure if you want pt to get more lab work done.   Pt requesting for labs to get done @ [email or web address]

## 2022-06-17 NOTE — TELEPHONE ENCOUNTER
From: [place]  To: Osiel Ruano MD  Sent: 6/17/2022 12:04 AM CDT  Subject: Blood Work for Physical Appt    I have a physical appt scheduled w/Dr. Vin Hollins for June 29. Could you please let me know if I need to have blood work done for this appt. I believe I may need my A1c checked as well but could you please verify. If blood work is needed, please send the order to an [facility] rather than [facility] like I normally do. Please let me know if you need to know the exact location. Thank you.  Soheila

## 2022-06-20 ENCOUNTER — PATIENT MESSAGE (OUTPATIENT)
Dept: INTERNAL MEDICINE CLINIC | Facility: CLINIC | Age: 52
End: 2022-06-20

## 2022-06-21 RX ORDER — LEVOTHYROXINE SODIUM 0.1 MG/1
TABLET ORAL
Qty: 90 TABLET | Refills: 0 | Status: SHIPPED | OUTPATIENT
Start: 2022-06-21

## 2022-06-29 ENCOUNTER — OFFICE VISIT (OUTPATIENT)
Dept: INTERNAL MEDICINE CLINIC | Facility: CLINIC | Age: 52
End: 2022-06-29
Payer: COMMERCIAL

## 2022-06-29 VITALS
HEIGHT: 66 IN | OXYGEN SATURATION: 97 % | TEMPERATURE: 98 F | BODY MASS INDEX: 45.06 KG/M2 | SYSTOLIC BLOOD PRESSURE: 126 MMHG | RESPIRATION RATE: 18 BRPM | HEART RATE: 78 BPM | WEIGHT: 280.38 LBS | DIASTOLIC BLOOD PRESSURE: 88 MMHG

## 2022-06-29 VITALS
RESPIRATION RATE: 16 BRPM | DIASTOLIC BLOOD PRESSURE: 78 MMHG | HEART RATE: 78 BPM | HEIGHT: 66 IN | SYSTOLIC BLOOD PRESSURE: 118 MMHG | WEIGHT: 280 LBS | BODY MASS INDEX: 45 KG/M2

## 2022-06-29 DIAGNOSIS — F41.9 ANXIETY AND DEPRESSION: ICD-10-CM

## 2022-06-29 DIAGNOSIS — F32.A ANXIETY AND DEPRESSION: ICD-10-CM

## 2022-06-29 DIAGNOSIS — E78.00 PURE HYPERCHOLESTEROLEMIA: ICD-10-CM

## 2022-06-29 DIAGNOSIS — E11.9 DIET-CONTROLLED DIABETES MELLITUS (HCC): ICD-10-CM

## 2022-06-29 DIAGNOSIS — R73.01 ELEVATED FASTING GLUCOSE: ICD-10-CM

## 2022-06-29 DIAGNOSIS — Z00.00 ENCOUNTER FOR ROUTINE ADULT MEDICAL EXAMINATION: Primary | ICD-10-CM

## 2022-06-29 DIAGNOSIS — I10 ESSENTIAL HYPERTENSION: ICD-10-CM

## 2022-06-29 DIAGNOSIS — E03.9 ACQUIRED HYPOTHYROIDISM: ICD-10-CM

## 2022-06-29 DIAGNOSIS — Z51.81 THERAPEUTIC DRUG MONITORING: Primary | ICD-10-CM

## 2022-06-29 LAB
CARTRIDGE LOT#: 896 NUMERIC
HEMOGLOBIN A1C: 5.2 % (ref 4.3–5.6)

## 2022-06-29 PROCEDURE — 3074F SYST BP LT 130 MM HG: CPT | Performed by: INTERNAL MEDICINE

## 2022-06-29 PROCEDURE — 83036 HEMOGLOBIN GLYCOSYLATED A1C: CPT | Performed by: INTERNAL MEDICINE

## 2022-06-29 PROCEDURE — 3008F BODY MASS INDEX DOCD: CPT | Performed by: INTERNAL MEDICINE

## 2022-06-29 PROCEDURE — 3079F DIAST BP 80-89 MM HG: CPT | Performed by: INTERNAL MEDICINE

## 2022-06-29 PROCEDURE — 3078F DIAST BP <80 MM HG: CPT | Performed by: INTERNAL MEDICINE

## 2022-06-29 PROCEDURE — 3044F HG A1C LEVEL LT 7.0%: CPT | Performed by: INTERNAL MEDICINE

## 2022-06-29 PROCEDURE — 99396 PREV VISIT EST AGE 40-64: CPT | Performed by: INTERNAL MEDICINE

## 2022-06-29 PROCEDURE — 99214 OFFICE O/P EST MOD 30 MIN: CPT | Performed by: INTERNAL MEDICINE

## 2022-06-29 RX ORDER — AMLODIPINE BESYLATE 10 MG/1
10 TABLET ORAL NIGHTLY
Qty: 90 TABLET | Refills: 3 | Status: SHIPPED | OUTPATIENT
Start: 2022-06-29

## 2022-06-29 RX ORDER — ESCITALOPRAM OXALATE 10 MG/1
10 TABLET ORAL DAILY
Qty: 90 TABLET | Refills: 3 | Status: SHIPPED | OUTPATIENT
Start: 2022-06-29

## 2022-06-29 RX ORDER — LEVOTHYROXINE SODIUM 0.1 MG/1
100 TABLET ORAL DAILY
Qty: 90 TABLET | Refills: 3 | Status: SHIPPED | OUTPATIENT
Start: 2022-06-29

## 2022-06-29 RX ORDER — SEMAGLUTIDE 2.68 MG/ML
2 INJECTION, SOLUTION SUBCUTANEOUS WEEKLY
Qty: 9 ML | Refills: 1 | Status: SHIPPED | OUTPATIENT
Start: 2022-06-29

## 2022-07-07 ENCOUNTER — TELEPHONE (OUTPATIENT)
Dept: INTERNAL MEDICINE CLINIC | Facility: CLINIC | Age: 52
End: 2022-07-07

## 2022-07-07 NOTE — TELEPHONE ENCOUNTER
Per the request of Dr. Citlalli Metzger patient and left detailed message letting her know that per the information received from her insurance carrier, she does NOT have coverage for Bariatric surgery but Dietitian services are covered. Encouraged patient to call back with any additional questions or concerns. [phone]

## 2022-07-13 ENCOUNTER — TELEPHONE (OUTPATIENT)
Dept: INTERNAL MEDICINE CLINIC | Facility: CLINIC | Age: 52
End: 2022-07-13

## 2022-07-13 NOTE — TELEPHONE ENCOUNTER
Received update from pt through [address] that she is no longer taking:     cyclobenzaprine 10 MG Tabs     Reason: \"I no longer have a need for them. \"     Updated pt's medication list.

## 2022-07-25 ENCOUNTER — TELEPHONE (OUTPATIENT)
Dept: SURGERY | Facility: CLINIC | Age: 52
End: 2022-07-25

## 2022-07-25 NOTE — TELEPHONE ENCOUNTER
Called patient to discuss Why Weight Seminar that patient was signed up for but never happened. Encouraged patient to call with any questions about the seminar or Bariatric Surgery seminar so we can get her started earlier then the next August seminar.

## 2022-09-15 ENCOUNTER — OFFICE VISIT (OUTPATIENT)
Dept: INTERNAL MEDICINE CLINIC | Facility: CLINIC | Age: 52
End: 2022-09-15
Payer: COMMERCIAL

## 2022-09-15 VITALS
BODY MASS INDEX: 44.36 KG/M2 | HEIGHT: 66 IN | HEART RATE: 81 BPM | DIASTOLIC BLOOD PRESSURE: 79 MMHG | WEIGHT: 276 LBS | OXYGEN SATURATION: 97 % | RESPIRATION RATE: 16 BRPM | SYSTOLIC BLOOD PRESSURE: 118 MMHG

## 2022-09-15 DIAGNOSIS — I10 ESSENTIAL HYPERTENSION: ICD-10-CM

## 2022-09-15 DIAGNOSIS — E11.9 DIET-CONTROLLED DIABETES MELLITUS (HCC): ICD-10-CM

## 2022-09-15 DIAGNOSIS — Z51.81 THERAPEUTIC DRUG MONITORING: Primary | ICD-10-CM

## 2022-09-15 PROCEDURE — 3074F SYST BP LT 130 MM HG: CPT | Performed by: INTERNAL MEDICINE

## 2022-09-15 PROCEDURE — 3008F BODY MASS INDEX DOCD: CPT | Performed by: INTERNAL MEDICINE

## 2022-09-15 PROCEDURE — 99214 OFFICE O/P EST MOD 30 MIN: CPT | Performed by: INTERNAL MEDICINE

## 2022-09-15 PROCEDURE — 3078F DIAST BP <80 MM HG: CPT | Performed by: INTERNAL MEDICINE

## 2022-09-15 RX ORDER — SEMAGLUTIDE 2.68 MG/ML
2 INJECTION, SOLUTION SUBCUTANEOUS WEEKLY
Qty: 9 ML | Refills: 1 | Status: SHIPPED | OUTPATIENT
Start: 2022-09-15

## 2022-10-09 NOTE — TELEPHONE ENCOUNTER
Requesting Folic Acid  LOV: 2/64/02  RTC: not noted  Last Relevant Labs: 10/9/21  Filled: 11/3/21 #90 with 3 refills    Future Appointments   Date Time Provider Shannan Dubon   12/6/2022 10:00 AM Cipriano Morales MD [facility]   3/15/2023 10:00 AM Cipriano Morales MD [facility]     Do you want a repeat lab?

## 2022-10-10 RX ORDER — FOLIC ACID 1 MG/1
1 TABLET ORAL DAILY
Qty: 90 TABLET | Refills: 0 | Status: SHIPPED | OUTPATIENT
Start: 2022-10-10

## 2022-12-06 ENCOUNTER — OFFICE VISIT (OUTPATIENT)
Dept: INTERNAL MEDICINE CLINIC | Facility: CLINIC | Age: 52
End: 2022-12-06
Payer: COMMERCIAL

## 2022-12-06 VITALS
HEIGHT: 66 IN | DIASTOLIC BLOOD PRESSURE: 80 MMHG | WEIGHT: 274 LBS | RESPIRATION RATE: 16 BRPM | BODY MASS INDEX: 44.03 KG/M2 | SYSTOLIC BLOOD PRESSURE: 110 MMHG | HEART RATE: 74 BPM | OXYGEN SATURATION: 99 %

## 2022-12-06 DIAGNOSIS — F32.A ANXIETY AND DEPRESSION: ICD-10-CM

## 2022-12-06 DIAGNOSIS — F41.9 ANXIETY AND DEPRESSION: ICD-10-CM

## 2022-12-06 DIAGNOSIS — I10 ESSENTIAL HYPERTENSION: ICD-10-CM

## 2022-12-06 DIAGNOSIS — E11.9 DIET-CONTROLLED DIABETES MELLITUS (HCC): ICD-10-CM

## 2022-12-06 DIAGNOSIS — Z51.81 THERAPEUTIC DRUG MONITORING: Primary | ICD-10-CM

## 2022-12-06 PROCEDURE — 99214 OFFICE O/P EST MOD 30 MIN: CPT | Performed by: INTERNAL MEDICINE

## 2022-12-06 PROCEDURE — 3008F BODY MASS INDEX DOCD: CPT | Performed by: INTERNAL MEDICINE

## 2022-12-06 PROCEDURE — 3079F DIAST BP 80-89 MM HG: CPT | Performed by: INTERNAL MEDICINE

## 2022-12-06 PROCEDURE — 3074F SYST BP LT 130 MM HG: CPT | Performed by: INTERNAL MEDICINE

## 2022-12-06 RX ORDER — SEMAGLUTIDE 2.68 MG/ML
2 INJECTION, SOLUTION SUBCUTANEOUS WEEKLY
Qty: 9 ML | Refills: 1 | Status: SHIPPED | OUTPATIENT
Start: 2022-12-06

## 2022-12-06 RX ORDER — PHENTERMINE HYDROCHLORIDE 15 MG/1
15 CAPSULE ORAL EVERY MORNING
Qty: 30 CAPSULE | Refills: 2 | Status: SHIPPED | OUTPATIENT
Start: 2022-12-06

## 2022-12-19 RX ORDER — FOLIC ACID 1 MG/1
TABLET ORAL
Qty: 90 TABLET | Refills: 0 | Status: SHIPPED | OUTPATIENT
Start: 2022-12-19

## 2023-03-06 NOTE — TELEPHONE ENCOUNTER
Requesting phentermine 15 mg  LOV: 12/6/22  RTC: not noted  Last Relevant Labs: na  Filled: 12/6/22 #30 with 2 refills last [place] 1[place]30/23 [place]    Future Appointments   Date Time Provider Shannan Dubon   3/15/2023 10:00 AM Alana Johnson MD [facility]

## 2023-03-07 RX ORDER — PHENTERMINE HYDROCHLORIDE 15 MG/1
CAPSULE ORAL
Qty: 30 CAPSULE | Refills: 2 | Status: SHIPPED | OUTPATIENT
Start: 2023-03-07

## 2023-03-15 ENCOUNTER — OFFICE VISIT (OUTPATIENT)
Dept: INTERNAL MEDICINE CLINIC | Facility: CLINIC | Age: 53
End: 2023-03-15
Payer: COMMERCIAL

## 2023-03-15 VITALS
HEART RATE: 86 BPM | SYSTOLIC BLOOD PRESSURE: 115 MMHG | WEIGHT: 262 LBS | OXYGEN SATURATION: 98 % | DIASTOLIC BLOOD PRESSURE: 82 MMHG | HEIGHT: 66 IN | BODY MASS INDEX: 42.11 KG/M2 | RESPIRATION RATE: 16 BRPM

## 2023-03-15 DIAGNOSIS — E11.9 DIET-CONTROLLED DIABETES MELLITUS (HCC): ICD-10-CM

## 2023-03-15 DIAGNOSIS — Z51.81 THERAPEUTIC DRUG MONITORING: Primary | ICD-10-CM

## 2023-03-15 DIAGNOSIS — I10 ESSENTIAL HYPERTENSION: ICD-10-CM

## 2023-03-15 DIAGNOSIS — F32.A ANXIETY AND DEPRESSION: ICD-10-CM

## 2023-03-15 DIAGNOSIS — F41.9 ANXIETY AND DEPRESSION: ICD-10-CM

## 2023-03-15 PROCEDURE — 99214 OFFICE O/P EST MOD 30 MIN: CPT | Performed by: INTERNAL MEDICINE

## 2023-03-15 PROCEDURE — 3008F BODY MASS INDEX DOCD: CPT | Performed by: INTERNAL MEDICINE

## 2023-03-15 PROCEDURE — 3079F DIAST BP 80-89 MM HG: CPT | Performed by: INTERNAL MEDICINE

## 2023-03-15 PROCEDURE — 3074F SYST BP LT 130 MM HG: CPT | Performed by: INTERNAL MEDICINE

## 2023-04-12 RX ORDER — FOLIC ACID 1 MG/1
TABLET ORAL
Qty: 90 TABLET | Refills: 0 | Status: SHIPPED | OUTPATIENT
Start: 2023-04-12

## 2023-04-13 RX ORDER — LOSARTAN POTASSIUM 100 MG/1
100 TABLET ORAL NIGHTLY
Qty: 90 TABLET | Refills: 0 | Status: SHIPPED | OUTPATIENT
Start: 2023-04-13

## 2023-06-05 RX ORDER — PHENTERMINE HYDROCHLORIDE 15 MG/1
CAPSULE ORAL
Qty: 30 CAPSULE | Refills: 0 | Status: SHIPPED | OUTPATIENT
Start: 2023-06-05

## 2023-07-05 RX ORDER — PHENTERMINE HYDROCHLORIDE 15 MG/1
15 CAPSULE ORAL EVERY MORNING
Qty: 30 CAPSULE | Refills: 0 | Status: SHIPPED | OUTPATIENT
Start: 2023-07-05

## 2023-07-05 NOTE — TELEPHONE ENCOUNTER
Requesting   Requested Prescriptions     Pending Prescriptions Disp Refills    PHENTERMINE HCL 15 MG Oral Cap [Pharmacy Med Name: Phentermine HCl Oral Capsule 15 MG] 30 capsule 0     Sig: TAKE 1 CAPSULE BY MOUTH IN THE MORNING     LOV: 3/15/23  RTC: 8 weeks  Filled: 6/5/23 #30 with 0 refills    Future Appointments   Date Time Provider Shannan Fryi   7/12/2023  8:00 AM Dede Vela MD [facility]   7/12/2023 10:00 AM Cipriano Morales MD [facility]   11/15/2023  8:20 AM Cipriano Morales MD [facility]

## 2023-07-06 ENCOUNTER — PATIENT MESSAGE (OUTPATIENT)
Dept: INTERNAL MEDICINE CLINIC | Facility: CLINIC | Age: 53
End: 2023-07-06

## 2023-07-06 DIAGNOSIS — I10 ESSENTIAL HYPERTENSION: Primary | ICD-10-CM

## 2023-07-06 DIAGNOSIS — Z00.00 LABORATORY TESTS ORDERED AS PART OF A COMPLETE PHYSICAL EXAM (CPE): ICD-10-CM

## 2023-07-06 DIAGNOSIS — R73.01 ELEVATED FASTING GLUCOSE: ICD-10-CM

## 2023-07-06 DIAGNOSIS — E03.9 ACQUIRED HYPOTHYROIDISM: ICD-10-CM

## 2023-07-08 ENCOUNTER — LAB ENCOUNTER (OUTPATIENT)
Dept: LAB | Age: 53
End: 2023-07-08
Attending: INTERNAL MEDICINE
Payer: COMMERCIAL

## 2023-07-08 DIAGNOSIS — Z00.00 LABORATORY TESTS ORDERED AS PART OF A COMPLETE PHYSICAL EXAM (CPE): ICD-10-CM

## 2023-07-08 LAB
ALBUMIN SERPL-MCNC: 3.6 G/DL (ref 3.4–5)
ALBUMIN/GLOB SERPL: 0.9 {RATIO} (ref 1–2)
ALP LIVER SERPL-CCNC: 97 U/L
ALT SERPL-CCNC: 36 U/L
ANION GAP SERPL CALC-SCNC: 8 MMOL/L (ref 0–18)
AST SERPL-CCNC: 33 U/L (ref 15–37)
BASOPHILS # BLD AUTO: 0.09 X10(3) UL (ref 0–0.2)
BASOPHILS NFR BLD AUTO: 1 %
BILIRUB SERPL-MCNC: 0.4 MG/DL (ref 0.1–2)
BUN BLD-MCNC: 13 MG/DL (ref 7–18)
CALCIUM BLD-MCNC: 9.5 MG/DL (ref 8.5–10.1)
CHLORIDE SERPL-SCNC: 106 MMOL/L (ref 98–112)
CHOLEST SERPL-MCNC: 180 MG/DL (ref ?–200)
CO2 SERPL-SCNC: 25 MMOL/L (ref 21–32)
CREAT BLD-MCNC: 0.93 MG/DL
EOSINOPHIL # BLD AUTO: 0.16 X10(3) UL (ref 0–0.7)
EOSINOPHIL NFR BLD AUTO: 1.7 %
ERYTHROCYTE [DISTWIDTH] IN BLOOD BY AUTOMATED COUNT: 12.9 %
EST. AVERAGE GLUCOSE BLD GHB EST-MCNC: 105 MG/DL (ref 68–126)
FASTING PATIENT LIPID ANSWER: YES
FASTING STATUS PATIENT QL REPORTED: YES
GFR SERPLBLD BASED ON 1.73 SQ M-ARVRAT: 74 ML/MIN/1.73M2 (ref 60–?)
GLOBULIN PLAS-MCNC: 4.1 G/DL (ref 2.8–4.4)
GLUCOSE BLD-MCNC: 92 MG/DL (ref 70–99)
HBA1C MFR BLD: 5.3 % (ref ?–5.7)
HCT VFR BLD AUTO: 44.4 %
HDLC SERPL-MCNC: 48 MG/DL (ref 40–59)
HGB BLD-MCNC: 14.8 G/DL
IMM GRANULOCYTES # BLD AUTO: 0.05 X10(3) UL (ref 0–1)
IMM GRANULOCYTES NFR BLD: 0.5 %
LDLC SERPL CALC-MCNC: 105 MG/DL (ref ?–100)
LYMPHOCYTES # BLD AUTO: 2.94 X10(3) UL (ref 1–4)
LYMPHOCYTES NFR BLD AUTO: 31.6 %
MCH RBC QN AUTO: 29.9 PG (ref 26–34)
MCHC RBC AUTO-ENTMCNC: 33.3 G/DL (ref 31–37)
MCV RBC AUTO: 89.7 FL
MONOCYTES # BLD AUTO: 0.54 X10(3) UL (ref 0.1–1)
MONOCYTES NFR BLD AUTO: 5.8 %
NEUTROPHILS # BLD AUTO: 5.51 X10 (3) UL (ref 1.5–7.7)
NEUTROPHILS # BLD AUTO: 5.51 X10(3) UL (ref 1.5–7.7)
NEUTROPHILS NFR BLD AUTO: 59.4 %
NONHDLC SERPL-MCNC: 132 MG/DL (ref ?–130)
OSMOLALITY SERPL CALC.SUM OF ELEC: 288 MOSM/KG (ref 275–295)
PLATELET # BLD AUTO: 271 10(3)UL (ref 150–450)
POTASSIUM SERPL-SCNC: 4.3 MMOL/L (ref 3.5–5.1)
PROT SERPL-MCNC: 7.7 G/DL (ref 6.4–8.2)
RBC # BLD AUTO: 4.95 X10(6)UL
SODIUM SERPL-SCNC: 139 MMOL/L (ref 136–145)
TRIGL SERPL-MCNC: 157 MG/DL (ref 30–149)
TSI SER-ACNC: 0.8 MIU/ML (ref 0.36–3.74)
VLDLC SERPL CALC-MCNC: 26 MG/DL (ref 0–30)
WBC # BLD AUTO: 9.3 X10(3) UL (ref 4–11)

## 2023-07-08 PROCEDURE — 36415 COLL VENOUS BLD VENIPUNCTURE: CPT

## 2023-07-08 PROCEDURE — 85025 COMPLETE CBC W/AUTO DIFF WBC: CPT

## 2023-07-08 PROCEDURE — 80053 COMPREHEN METABOLIC PANEL: CPT

## 2023-07-08 PROCEDURE — 80061 LIPID PANEL: CPT

## 2023-07-08 PROCEDURE — 83036 HEMOGLOBIN GLYCOSYLATED A1C: CPT

## 2023-07-08 PROCEDURE — 84443 ASSAY THYROID STIM HORMONE: CPT

## 2023-07-12 ENCOUNTER — OFFICE VISIT (OUTPATIENT)
Dept: INTERNAL MEDICINE CLINIC | Facility: CLINIC | Age: 53
End: 2023-07-12
Payer: COMMERCIAL

## 2023-07-12 ENCOUNTER — TELEPHONE (OUTPATIENT)
Dept: INTERNAL MEDICINE CLINIC | Facility: CLINIC | Age: 53
End: 2023-07-12

## 2023-07-12 ENCOUNTER — PATIENT OUTREACH (OUTPATIENT)
Dept: CASE MANAGEMENT | Age: 53
End: 2023-07-12

## 2023-07-12 VITALS
HEART RATE: 75 BPM | OXYGEN SATURATION: 99 % | WEIGHT: 263 LBS | HEIGHT: 66 IN | BODY MASS INDEX: 42.27 KG/M2 | TEMPERATURE: 98 F | SYSTOLIC BLOOD PRESSURE: 118 MMHG | DIASTOLIC BLOOD PRESSURE: 82 MMHG | RESPIRATION RATE: 16 BRPM

## 2023-07-12 VITALS
RESPIRATION RATE: 16 BRPM | SYSTOLIC BLOOD PRESSURE: 124 MMHG | WEIGHT: 263 LBS | DIASTOLIC BLOOD PRESSURE: 78 MMHG | HEART RATE: 78 BPM | HEIGHT: 66 IN | BODY MASS INDEX: 42.27 KG/M2

## 2023-07-12 DIAGNOSIS — E11.9 DIET-CONTROLLED DIABETES MELLITUS (HCC): ICD-10-CM

## 2023-07-12 DIAGNOSIS — Z00.00 ENCOUNTER FOR ROUTINE ADULT MEDICAL EXAMINATION: Primary | ICD-10-CM

## 2023-07-12 DIAGNOSIS — E03.9 ACQUIRED HYPOTHYROIDISM: ICD-10-CM

## 2023-07-12 DIAGNOSIS — Z51.81 THERAPEUTIC DRUG MONITORING: Primary | ICD-10-CM

## 2023-07-12 DIAGNOSIS — R73.01 ELEVATED FASTING GLUCOSE: ICD-10-CM

## 2023-07-12 DIAGNOSIS — K58.0 IRRITABLE BOWEL SYNDROME WITH DIARRHEA: ICD-10-CM

## 2023-07-12 DIAGNOSIS — R19.5 LOOSE STOOLS: ICD-10-CM

## 2023-07-12 DIAGNOSIS — I10 ESSENTIAL HYPERTENSION: ICD-10-CM

## 2023-07-12 PROCEDURE — 3079F DIAST BP 80-89 MM HG: CPT | Performed by: INTERNAL MEDICINE

## 2023-07-12 PROCEDURE — 3008F BODY MASS INDEX DOCD: CPT | Performed by: INTERNAL MEDICINE

## 2023-07-12 PROCEDURE — 99213 OFFICE O/P EST LOW 20 MIN: CPT | Performed by: INTERNAL MEDICINE

## 2023-07-12 PROCEDURE — 3078F DIAST BP <80 MM HG: CPT | Performed by: INTERNAL MEDICINE

## 2023-07-12 PROCEDURE — 3074F SYST BP LT 130 MM HG: CPT | Performed by: INTERNAL MEDICINE

## 2023-07-12 PROCEDURE — 99396 PREV VISIT EST AGE 40-64: CPT | Performed by: INTERNAL MEDICINE

## 2023-07-12 PROCEDURE — 99214 OFFICE O/P EST MOD 30 MIN: CPT | Performed by: INTERNAL MEDICINE

## 2023-07-12 RX ORDER — PANCRELIPASE LIPASE, PANCRELIPASE PROTEASE, PANCRELIPASE AMYLASE 40000; 126000; 168000 [USP'U]/1; [USP'U]/1; [USP'U]/1
3 CAPSULE, DELAYED RELEASE ORAL
Qty: 540 CAPSULE | Refills: 2 | Status: SHIPPED | OUTPATIENT
Start: 2023-07-12 | End: 2023-08-11

## 2023-07-12 RX ORDER — SEMAGLUTIDE 2.68 MG/ML
2 INJECTION, SOLUTION SUBCUTANEOUS WEEKLY
Qty: 9 ML | Refills: 1 | Status: SHIPPED | OUTPATIENT
Start: 2023-07-12

## 2023-07-12 RX ORDER — PHENTERMINE HYDROCHLORIDE 37.5 MG/1
37.5 TABLET ORAL
Qty: 30 TABLET | Refills: 2 | Status: SHIPPED | OUTPATIENT
Start: 2023-07-12

## 2023-07-12 NOTE — PROCEDURES
The office order for Diabetes registry removal request is Approved and finalized on July 12, 2023.     Thanks,  [facility] [employer]

## 2023-07-12 NOTE — PATIENT INSTRUCTIONS
If your insurance is not covering the colonoscopy, talk with your doctor about potentially writing an appeal letter regarding polyp and family history and need for diagnostic colonoscopy. Consider Shingrix shingles vaccine     Allergists:  Dr. Jhon Nunez, [address], [address] Office: [phone]. Dr. Debbie Hayes to see if medication changes will improve stool symptoms:    1) Change metformin dosing to bedtime  2) If after 10 days there is no change in symptoms, then stop metformin for 7 days. 3) if no change in symptoms, next step would be stop escitalopram for 5 days. 4) if still no changes in symptoms, please discuss with Dr. Linda Mcintosh about decreasing the dose or stopping ozempic for a trial to see if this is causing your symptoms.

## 2023-07-16 ENCOUNTER — PATIENT MESSAGE (OUTPATIENT)
Dept: INTERNAL MEDICINE CLINIC | Facility: CLINIC | Age: 53
End: 2023-07-16

## 2023-07-17 NOTE — TELEPHONE ENCOUNTER
From: Guillaume Collado  To: Kaiser Mensah MD  Sent: 7/16/2023 11:15 PM CDT  Subject: Letter documentation request    Hi Dr. Tatyana Chen, can you please send me the letter that I was in for my yearly physical? My 's company requires that we send the letter for the insurance premiums. Thank you.

## 2023-07-19 RX ORDER — ALPRAZOLAM 0.25 MG/1
0.25 TABLET ORAL 2 TIMES DAILY PRN
Qty: 20 TABLET | Refills: 1 | Status: SHIPPED | OUTPATIENT
Start: 2023-07-19

## 2023-07-19 NOTE — TELEPHONE ENCOUNTER
Alprazolam 0.25 mg  Filled 9-13-21  Qty 30  3 refills  No upcoming appt.    LOV 7-12-23 LS  RTC 4wks

## 2023-07-25 RX ORDER — FOLIC ACID 1 MG/1
1 TABLET ORAL DAILY
Qty: 90 TABLET | Refills: 0 | Status: SHIPPED | OUTPATIENT
Start: 2023-07-25

## 2023-07-25 RX ORDER — LOSARTAN POTASSIUM 100 MG/1
100 TABLET ORAL NIGHTLY
Qty: 90 TABLET | Refills: 1 | Status: SHIPPED | OUTPATIENT
Start: 2023-07-25

## 2023-07-25 NOTE — TELEPHONE ENCOUNTER
Losartan Potassium Oral Tablet 100 MG          Will file in chart as: LOSARTAN 100 MG Oral Tab    Sig: TAKE 1 TABLET BY MOUTH EVERY DAY AT NIGHT    Disp: 90 tablet    Refills: 0    Start: 7/24/2023    Class: Normal    Non-formulary    Last ordered: 3 months ago by Sapna Olguin MD Last refill: 4/13/2023    Rx #: [id number]    Hypertension Medications Protocol [id number]/24/2023 08:40 AM   Protocol Details CMP or BMP in past 12 months    Last serum creatinine< 2.0    Appointment in past 6 or next 3 months      LOV 7/12/23  RTC 4 weeks  Filled 4/13/23 90 tabs 0 refills   Last labs 7/8/23  Future Appointments   Date Time Provider Shannan Dubon   11/15/2023  8:20 AM Ike Alvarez MD [facility]

## 2023-07-25 NOTE — TELEPHONE ENCOUNTER
Requesting   Requested Prescriptions     Pending Prescriptions Disp Refills    FOLIC ACID 1 MG Oral Tab [Pharmacy Med Name: Folic Acid Oral Tablet 1 MG] 90 tablet 0     Sig: TAKE 1 TABLET BY MOUTH EVERY DAY     LOV: 7/12/23  RTC: not noted  Filled: 4/12/23 #90 with 0 refills    Future Appointments   Date Time Provider Shannan Dubon   11/15/2023  8:20 AM Meryl Reeves MD [facility]

## 2023-09-11 RX ORDER — AMLODIPINE BESYLATE 10 MG/1
10 TABLET ORAL NIGHTLY
Qty: 90 TABLET | Refills: 0 | Status: SHIPPED | OUTPATIENT
Start: 2023-09-11

## 2023-09-11 NOTE — TELEPHONE ENCOUNTER
LOV: 7/12/23   RTC: 4 weeks  Filled 6/29/22 # 90 3 refills   Labs: 7/8/23   Future Appointments   Date Time Provider Shannan Dubon   11/15/2023  8:20 AM [facility]

## 2023-09-12 RX ORDER — ESCITALOPRAM OXALATE 10 MG/1
10 TABLET ORAL DAILY
Qty: 90 TABLET | Refills: 1 | Status: SHIPPED | OUTPATIENT
Start: 2023-09-12

## 2023-09-20 ENCOUNTER — PATIENT MESSAGE (OUTPATIENT)
Dept: INTERNAL MEDICINE CLINIC | Facility: CLINIC | Age: 53
End: 2023-09-20

## 2023-09-21 ENCOUNTER — NURSE TRIAGE (OUTPATIENT)
Dept: INTERNAL MEDICINE CLINIC | Facility: CLINIC | Age: 53
End: 2023-09-21

## 2023-09-21 RX ORDER — ONDANSETRON 4 MG/1
4 TABLET, ORALLY DISINTEGRATING ORAL EVERY 8 HOURS PRN
Qty: 30 TABLET | Refills: 0 | Status: SHIPPED | OUTPATIENT
Start: 2023-09-21

## 2023-09-21 NOTE — TELEPHONE ENCOUNTER
Action Requested: Summary for Provider     []  Critical Lab, Recommendations Needed  [x] Need Additional Advice  []   FYI    []   Need Orders  [] Need Medications Sent to Pharmacy  []  Other     SUMMARY: Pt's symptoms have worsened x 1 month. Pt had panic attack due to trying zip line on vacation. Ever since, sour stomach/belching has not resolved. Pt cannot yet afford to have her c-scope due to insurance coverage issues. Pt has been taking Ozempic ~ 2 years. Pt has been taking phentermine ~ 6 months. Diarrhea is chronic for pt, the sour taste is new, pt denies any pain from the belching. Reason for call: Condition Update ( GI symptoms)  Onset: 1 month ago    [employer] message copied into TE for further triage. Needed to clarify, I frequently feel nauseous for extended periods of time but I don't actual vomit. Soheila Lopez  P [facility] Clinical Staff (supporting Beth Kaminski MD)8 hours ago (11:54 PM)       For past 1 month after possible  panic attack, I have experienced frequent nausea, sour belching, gas, diarrhea. Symptoms are not always together but frequently they are. I don't have symptoms everyday, there may be a few days without them but they keep returning. I originally thought it was stress but because it keeps coming back and has been going on for over a month now, thought I should make an appt. First appt is 11/01 which I've accepted. Didn't know if something else I should do in the meantime since appt is 6 weeks out. Thank you for your assistance.   Prabhjot Howard   Reason for Disposition   Taking prescription medication that could cause nausea (e.g., narcotics/opiates, antibiotics, OCPs, many others)    Protocols used: Aileen Huston

## 2023-09-21 NOTE — TELEPHONE ENCOUNTER
I recommend stopping the ozempic for 2 weeks to re-assess symptoms. Nausea is a very common side effect and likely is related. I will also prescribe an anti-nausea medication called ondansetron to take every 8 hours as needed.  Sent to the pharmacy on file, [facility] in [place]

## 2023-09-21 NOTE — TELEPHONE ENCOUNTER
From: Bhakti oJnes  To: Ry Wills  Sent: 9/20/2023 11:54 PM CDT  Subject: Stomach Issues    For past 1 month after possible panic attack, I have experienced frequent nausea, sour belching, gas, diarrhea. Symptoms are not always together but frequently they are. I don't have symptoms everyday, there may be a few days without them but they keep returning. I originally thought it was stress but because it keeps coming back and has been going on for over a month now, thought I should make an appt. First appt is 11/01 which I've accepted. Didn't know if something else I should do in the meantime since appt is 6 weeks out. Thank you for your assistance.  Wojciech Ambrose

## 2023-09-26 RX ORDER — LEVOTHYROXINE SODIUM 0.1 MG/1
100 TABLET ORAL DAILY
Qty: 90 TABLET | Refills: 3 | Status: SHIPPED | OUTPATIENT
Start: 2023-09-26

## 2023-09-26 NOTE — TELEPHONE ENCOUNTER
Levothyroxine Sodium Oral Tablet 100 MCG          Will file in chart as: LEVOTHYROXINE 100 MCG Oral Tab    Sig: TAKE 1 TABLET BY MOUTH EVERY DAY    Disp: 90 tablet    Refills: 0    Start: 9/26/2023    Class: Normal    Non-formulary    Last ordered: 1 year ago (6/29/2022) by Ry Wills MD    Last refill: 5/17/2023    Rx #: [id number]    Thyroid Supplements Protocol Ejcavz9909/26/2023 08:45 AM   Protocol Details TSH test in past 12 months    TSH value between 0.350 and 5.500 IU/ml    Appointment in past 12 or next 3 months      LOV 7/12/23  RTC 4 weeks  Filled 6/29/22 90 tabs 3 refills   Last labs 7/8/23  Future Appointments   Date Time Provider Shannan Fryi   10/13/2023  8:30 AM Blanco Mccray MD [facility] [place]   11/15/2023  8:20 AM Mray Babcock MD [facility]

## 2023-10-13 ENCOUNTER — OFFICE VISIT (OUTPATIENT)
Dept: INTERNAL MEDICINE CLINIC | Facility: CLINIC | Age: 53
End: 2023-10-13
Payer: COMMERCIAL

## 2023-10-13 VITALS
SYSTOLIC BLOOD PRESSURE: 122 MMHG | WEIGHT: 258.63 LBS | HEART RATE: 101 BPM | RESPIRATION RATE: 16 BRPM | OXYGEN SATURATION: 100 % | TEMPERATURE: 98 F | BODY MASS INDEX: 41.56 KG/M2 | DIASTOLIC BLOOD PRESSURE: 72 MMHG | HEIGHT: 66 IN

## 2023-10-13 DIAGNOSIS — I10 ESSENTIAL HYPERTENSION: Primary | ICD-10-CM

## 2023-10-13 DIAGNOSIS — E03.9 ACQUIRED HYPOTHYROIDISM: ICD-10-CM

## 2023-10-13 DIAGNOSIS — E66.01 OBESITY, CLASS III, BMI 40-49.9 (MORBID OBESITY) (HCC): ICD-10-CM

## 2023-10-13 PROCEDURE — 99213 OFFICE O/P EST LOW 20 MIN: CPT | Performed by: INTERNAL MEDICINE

## 2023-10-13 PROCEDURE — 3078F DIAST BP <80 MM HG: CPT | Performed by: INTERNAL MEDICINE

## 2023-10-13 PROCEDURE — 3074F SYST BP LT 130 MM HG: CPT | Performed by: INTERNAL MEDICINE

## 2023-10-13 PROCEDURE — 3008F BODY MASS INDEX DOCD: CPT | Performed by: INTERNAL MEDICINE

## 2023-10-13 RX ORDER — FOLIC ACID 1 MG/1
1 TABLET ORAL DAILY
Qty: 90 TABLET | Refills: 0 | Status: SHIPPED | OUTPATIENT
Start: 2023-10-13

## 2023-10-13 RX ORDER — PHENTERMINE HYDROCHLORIDE 37.5 MG/1
37.5 TABLET ORAL
Qty: 30 TABLET | Refills: 0 | Status: SHIPPED | OUTPATIENT
Start: 2023-10-13

## 2023-10-13 NOTE — TELEPHONE ENCOUNTER
Requesting   Requested Prescriptions     Pending Prescriptions Disp Refills    FOLIC ACID 1 MG Oral Tab [Pharmacy Med Name: Folic Acid Oral Tablet 1 MG] 90 tablet 0     Sig: TAKE 1 TABLET BY MOUTH EVERY DAY    PHENTERMINE HCL 37.5 MG Oral Tab [Pharmacy Med Name: Phentermine HCl Oral Tablet 37.5 MG] 30 tablet 0     Sig: Take 1 tablet (37.5 mg total) by mouth every morning before breakfast.     LOV: 7/12/23  RTC: not noted  Filled: phen 7/12/23 #30 with 2 refills  Folic acid 7/23/83 #39 with 0 refills    Future Appointments   Date Time Provider Shannan Dubon   11/15/2023  8:20 AM [facility]   2/1/2024  8:20 AM Meryl Reeves MD [facility]

## 2023-10-13 NOTE — PATIENT INSTRUCTIONS
Recommend staying off of Ozempic for at least a month and potentially staying at a low dose if you re-start    Talk to Dr. Nicole Rausch regarding 3 year follow up colonoscopy     Try starting a probiotic such as culturelle/align or in the refrigerated section of health foods area of [employer] store

## 2023-11-10 NOTE — TELEPHONE ENCOUNTER
Requesting   Requested Prescriptions     Pending Prescriptions Disp Refills    PHENTERMINE HCL 37.5 MG Oral Tab [Pharmacy Med Name: Phentermine HCl Oral Tablet 37.5 MG] 30 tablet 0     Sig: Take 1 tablet (37.5 mg total) by mouth every morning before breakfast.       LOV: 3/15/23  RTC:   Last Relevant Labs:   Filled: 10/13/23 #30 with 0 refills    Future Appointments   Date Time Provider Shannan Dubon   11/15/2023  8:20 AM MD DAISHA Barr [address]   2/1/2024  8:20 AM MD DAISHA Barr [facility]

## 2023-11-13 RX ORDER — PHENTERMINE HYDROCHLORIDE 37.5 MG/1
37.5 TABLET ORAL
Qty: 30 TABLET | Refills: 0 | Status: SHIPPED | OUTPATIENT
Start: 2023-11-13

## 2023-11-15 ENCOUNTER — OFFICE VISIT (OUTPATIENT)
Dept: INTERNAL MEDICINE CLINIC | Facility: CLINIC | Age: 53
End: 2023-11-15
Payer: COMMERCIAL

## 2023-11-15 VITALS
HEART RATE: 88 BPM | RESPIRATION RATE: 16 BRPM | DIASTOLIC BLOOD PRESSURE: 82 MMHG | SYSTOLIC BLOOD PRESSURE: 130 MMHG | BODY MASS INDEX: 42.59 KG/M2 | WEIGHT: 265 LBS | HEIGHT: 66 IN

## 2023-11-15 DIAGNOSIS — Z51.81 THERAPEUTIC DRUG MONITORING: Primary | ICD-10-CM

## 2023-11-15 DIAGNOSIS — F41.9 ANXIETY AND DEPRESSION: ICD-10-CM

## 2023-11-15 DIAGNOSIS — I10 ESSENTIAL HYPERTENSION: ICD-10-CM

## 2023-11-15 DIAGNOSIS — R06.83 SNORING: ICD-10-CM

## 2023-11-15 DIAGNOSIS — R06.81 APNEA: ICD-10-CM

## 2023-11-15 DIAGNOSIS — E11.9 DIET-CONTROLLED DIABETES MELLITUS (HCC): ICD-10-CM

## 2023-11-15 DIAGNOSIS — F32.A ANXIETY AND DEPRESSION: ICD-10-CM

## 2023-11-15 PROCEDURE — 3075F SYST BP GE 130 - 139MM HG: CPT | Performed by: INTERNAL MEDICINE

## 2023-11-15 PROCEDURE — 3008F BODY MASS INDEX DOCD: CPT | Performed by: INTERNAL MEDICINE

## 2023-11-15 PROCEDURE — 3079F DIAST BP 80-89 MM HG: CPT | Performed by: INTERNAL MEDICINE

## 2023-11-15 PROCEDURE — 99214 OFFICE O/P EST MOD 30 MIN: CPT | Performed by: INTERNAL MEDICINE

## 2023-12-13 ENCOUNTER — OFFICE VISIT (OUTPATIENT)
Dept: SLEEP CENTER | Age: 53
End: 2023-12-13
Attending: INTERNAL MEDICINE
Payer: COMMERCIAL

## 2023-12-13 DIAGNOSIS — R73.01 ELEVATED FASTING GLUCOSE: ICD-10-CM

## 2023-12-13 DIAGNOSIS — E78.1 HYPERTRIGLYCERIDEMIA: ICD-10-CM

## 2023-12-13 DIAGNOSIS — Z51.81 THERAPEUTIC DRUG MONITORING: Primary | ICD-10-CM

## 2023-12-13 DIAGNOSIS — E11.9 DIET-CONTROLLED DIABETES MELLITUS (HCC): ICD-10-CM

## 2023-12-13 DIAGNOSIS — R06.81 APNEA: ICD-10-CM

## 2023-12-13 DIAGNOSIS — F32.A ANXIETY AND DEPRESSION: ICD-10-CM

## 2023-12-13 DIAGNOSIS — I10 ESSENTIAL HYPERTENSION: ICD-10-CM

## 2023-12-13 DIAGNOSIS — F41.9 ANXIETY AND DEPRESSION: ICD-10-CM

## 2023-12-13 DIAGNOSIS — R06.83 SNORING: ICD-10-CM

## 2023-12-13 PROCEDURE — 95810 POLYSOM 6/> YRS 4/> PARAM: CPT

## 2023-12-14 RX ORDER — PHENTERMINE HYDROCHLORIDE 37.5 MG/1
37.5 TABLET ORAL
Qty: 30 TABLET | Refills: 0 | Status: SHIPPED | OUTPATIENT
Start: 2023-12-14

## 2023-12-14 RX ORDER — AMLODIPINE BESYLATE 10 MG/1
10 TABLET ORAL NIGHTLY
Qty: 90 TABLET | Refills: 0 | Status: SHIPPED | OUTPATIENT
Start: 2023-12-14

## 2023-12-14 NOTE — TELEPHONE ENCOUNTER
Protocol passed     Amlodipine 10mg     LOV: 10/13/23   RTC: none noted  Filled: 9/11/23 # 90 0 REFILLS   Labs:  7/8/23   Future Appointments   Date Time Provider Shannan Dubon   2/1/2024  8:20 AM Iman Vargas MD [facility]   5/15/2024  8:20 AM Iman Vargas MD [facility]

## 2023-12-14 NOTE — TELEPHONE ENCOUNTER
Requesting   Requested Prescriptions     Pending Prescriptions Disp Refills    PHENTERMINE HCL 37.5 MG Oral Tab [Pharmacy Med Name: Phentermine HCl Oral Tablet 37.5 MG] 30 tablet 0     Sig: Take 1 tablet (37.5 mg total) by mouth every morning before breakfast.     LOV: 11/15/23  RTC: not noted  Filled: 11/13/23 #30 with 0 refills    Future Appointments   Date Time Provider Shannan Dubon   2/1/2024  8:20 AM Henrry Mccullough MD [address]   5/15/2024  8:20 AM Henrry Mccullough MD [address]

## 2023-12-18 ENCOUNTER — ORDER TRANSCRIPTION (OUTPATIENT)
Dept: SLEEP CENTER | Age: 53
End: 2023-12-18

## 2023-12-18 DIAGNOSIS — I10 HYPERTENSION, UNSPECIFIED TYPE: ICD-10-CM

## 2023-12-18 DIAGNOSIS — E11.9 DIET-CONTROLLED DIABETES MELLITUS (HCC): ICD-10-CM

## 2023-12-18 DIAGNOSIS — Z51.81 THERAPEUTIC DRUG MONITORING: Primary | ICD-10-CM

## 2023-12-18 DIAGNOSIS — F32.A DEPRESSION: ICD-10-CM

## 2023-12-18 DIAGNOSIS — R06.81 APNEA: ICD-10-CM

## 2023-12-18 DIAGNOSIS — R06.83 SNORING: ICD-10-CM

## 2023-12-18 DIAGNOSIS — F41.9 ANXIETY: ICD-10-CM

## 2023-12-30 ENCOUNTER — V-VISIT (OUTPATIENT)
Dept: URGENT CARE | Age: 53
End: 2023-12-30

## 2023-12-30 VITALS
HEART RATE: 93 BPM | WEIGHT: 267 LBS | SYSTOLIC BLOOD PRESSURE: 116 MMHG | TEMPERATURE: 98 F | BODY MASS INDEX: 42.91 KG/M2 | HEIGHT: 66 IN | OXYGEN SATURATION: 99 % | DIASTOLIC BLOOD PRESSURE: 72 MMHG | RESPIRATION RATE: 18 BRPM

## 2023-12-30 DIAGNOSIS — J06.9 VIRAL URI WITH COUGH: Primary | ICD-10-CM

## 2023-12-30 LAB
INTERNAL PROCEDURAL CONTROLS ACCEPTABLE: YES
INTERNAL PROCEDURAL CONTROLS ACCEPTABLE: YES
S PYO AG THROAT QL IA.RAPID: NEGATIVE
SARS-COV+SARS-COV-2 AG RESP QL IA.RAPID: NOT DETECTED
TEST LOT EXPIRATION DATE: NORMAL
TEST LOT EXPIRATION DATE: NORMAL
TEST LOT NUMBER: NORMAL
TEST LOT NUMBER: NORMAL

## 2023-12-30 PROCEDURE — 87880 STREP A ASSAY W/OPTIC: CPT | Performed by: NURSE PRACTITIONER

## 2023-12-30 PROCEDURE — 99203 OFFICE O/P NEW LOW 30 MIN: CPT | Performed by: NURSE PRACTITIONER

## 2023-12-30 PROCEDURE — 87426 SARSCOV CORONAVIRUS AG IA: CPT | Performed by: NURSE PRACTITIONER

## 2023-12-30 ASSESSMENT — ENCOUNTER SYMPTOMS
COUGH: 1
EYE DISCHARGE: 0
CHILLS: 0
EYE ITCHING: 0
HEADACHES: 0
EYE REDNESS: 0
NAUSEA: 0
FEVER: 0
SORE THROAT: 1
RHINORRHEA: 1
VOMITING: 0
FATIGUE: 0
DIARRHEA: 0

## 2023-12-30 ASSESSMENT — PAIN SCALES - GENERAL: PAINLEVEL: 8

## 2024-01-02 ENCOUNTER — PATIENT MESSAGE (OUTPATIENT)
Dept: INTERNAL MEDICINE CLINIC | Facility: CLINIC | Age: 54
End: 2024-01-02

## 2024-01-02 DIAGNOSIS — R05.1 ACUTE COUGH: Primary | ICD-10-CM

## 2024-01-02 NOTE — TELEPHONE ENCOUNTER
From: Soheila Morley  To: Hope Yao  Sent: 1/2/2024 1:26 PM CST  Subject: Is appointment needed?    Hi Dr. Yao, Wanted to touch base see if I should come in. This is the time frame:  Late Wed 12/27 voice became horse  Woke up Thur 12/28 voice completely gone and throat swollen, glands hurt to touch  Friday 12/29 same symptoms, tried getting into immediate care unable to  Sat 12/30 immediate care visit, tested and negative for Covid and strep recommended to take ibuprofen for sore throat. Came home took ibuprofen few hours later throat much better but now nose completely blocked.  Sun 12/31 rotation between moderate sore throat, nose completely blocked, eyes now blood shot and anytime they've been closed for a few hours they are stuck closed with gook.   Mon 01/01 - same symptoms nothing better  Today 01/02 - same symptoms, eyes not AS blood shot but still gook in morning. Throat and pain now going up into ear and headaches starting. I'm not feeling any better just symptoms keep moving around or will have some and not others or sometimes will have all at same time.   Been doing lots of rest, hot tea, hot soup, Vicks, fluids, hot showers. Took about 6 doses of Dayquil on Friday. Any benefit for me coming to see you or any suggestions of something I should try.   Thank you for your time. Soheila Morley.

## 2024-01-05 RX ORDER — CODEINE PHOSPHATE AND GUAIFENESIN 10; 100 MG/5ML; MG/5ML
5 SOLUTION ORAL NIGHTLY PRN
Qty: 118 ML | Refills: 0 | Status: SHIPPED | OUTPATIENT
Start: 2024-01-05

## 2024-01-05 NOTE — TELEPHONE ENCOUNTER
Called patient and she notes some symptoms have improved but cough is continuing. Sore throat is coming and going but better than previous.     Advised no driving or alcohol when taking.     Denies fever. Advised us to let us know if symptoms worsen or do not resolve.

## 2024-01-16 DIAGNOSIS — E11.9 DIET-CONTROLLED DIABETES MELLITUS (HCC): Primary | ICD-10-CM

## 2024-01-16 NOTE — TELEPHONE ENCOUNTER
Requesting   Requested Prescriptions     Pending Prescriptions Disp Refills    PHENTERMINE HCL 37.5 MG Oral Tab [Pharmacy Med Name: Phentermine HCl Oral Tablet 37.5 MG] 30 tablet 0     Sig: Take 1 tablet by mouth every morning before breakfast.    FOLIC ACID 1 MG Oral Tab [Pharmacy Med Name: Folic Acid Oral Tablet 1 MG] 90 tablet 0     Sig: TAKE 1 TABLET BY MOUTH EVERY DAY     LOV: 11/15/23  RTC: not noted  Filled: phen 12/14/23 #30 with 0 refills  Folic acid 10/13/23 #90 with 0 refills    Future Appointments   Date Time Provider Department Center   2/1/2024  8:20 AM Rhea Morris MD [facility] [address]   5/15/2024  8:20 AM Rhea Morris MD [facility]

## 2024-01-17 DIAGNOSIS — E11.9 DIET-CONTROLLED DIABETES MELLITUS (HCC): ICD-10-CM

## 2024-01-17 RX ORDER — LOSARTAN POTASSIUM 100 MG/1
100 TABLET ORAL NIGHTLY
Qty: 90 TABLET | Refills: 0 | Status: SHIPPED | OUTPATIENT
Start: 2024-01-17

## 2024-01-17 RX ORDER — PHENTERMINE HYDROCHLORIDE 37.5 MG/1
37.5 TABLET ORAL
Qty: 30 TABLET | Refills: 1 | Status: SHIPPED | OUTPATIENT
Start: 2024-01-17

## 2024-01-17 RX ORDER — FOLIC ACID 1 MG/1
1 TABLET ORAL DAILY
Qty: 90 TABLET | Refills: 0 | Status: SHIPPED | OUTPATIENT
Start: 2024-01-17

## 2024-01-17 NOTE — TELEPHONE ENCOUNTER
LOV: 10/13/23   RTC: none noted   Labs: 7/8/23   Filled: 7/25/23 #90 1 refill   Future Appointments   Date Time Provider Department Center   2/1/2024  8:20 AM Rhea Morris MD [facility] [address]   5/15/2024  8:20 AM Rhea Morris MD [facility]

## 2024-01-18 RX ORDER — FOLIC ACID 1 MG/1
1 TABLET ORAL DAILY
Qty: 90 TABLET | Refills: 0 | OUTPATIENT
Start: 2024-01-18

## 2024-01-24 ENCOUNTER — OFFICE VISIT (OUTPATIENT)
Facility: CLINIC | Age: 54
End: 2024-01-24
Payer: COMMERCIAL

## 2024-01-24 VITALS
OXYGEN SATURATION: 95 % | HEART RATE: 86 BPM | BODY MASS INDEX: 43.23 KG/M2 | WEIGHT: 269 LBS | HEIGHT: 66 IN | RESPIRATION RATE: 16 BRPM | SYSTOLIC BLOOD PRESSURE: 132 MMHG | DIASTOLIC BLOOD PRESSURE: 80 MMHG

## 2024-01-24 DIAGNOSIS — E66.01 CLASS 3 SEVERE OBESITY DUE TO EXCESS CALORIES WITH SERIOUS COMORBIDITY AND BODY MASS INDEX (BMI) OF 45.0 TO 49.9 IN ADULT (HCC): ICD-10-CM

## 2024-01-24 DIAGNOSIS — G47.33 OSA (OBSTRUCTIVE SLEEP APNEA): Primary | ICD-10-CM

## 2024-01-24 DIAGNOSIS — G47.19 DAYTIME HYPERSOMNOLENCE: ICD-10-CM

## 2024-01-24 PROCEDURE — 99204 OFFICE O/P NEW MOD 45 MIN: CPT | Performed by: INTERNAL MEDICINE

## 2024-01-24 PROCEDURE — 3075F SYST BP GE 130 - 139MM HG: CPT | Performed by: INTERNAL MEDICINE

## 2024-01-24 PROCEDURE — 3079F DIAST BP 80-89 MM HG: CPT | Performed by: INTERNAL MEDICINE

## 2024-01-24 PROCEDURE — 3008F BODY MASS INDEX DOCD: CPT | Performed by: INTERNAL MEDICINE

## 2024-01-24 NOTE — PROGRESS NOTES
Pulmonary/Critical Care/Sleep Medicine    Consult Note     PCP: Hope Yao MD   Phone: [phone]   Fax: [phone]          Chief Complaint   Patient presents with    New Patient    Obstructive Sleep Apnea (KEISHA)     PSG 12/13/23       HPI  I had the pleasure of seeing Soheila Morley who is a pleasant 53 year old female who presents for evaluation of KEISHA      The patient states that she has snored at home in past, she has excessive daytime sleepiness and underwent a sleep study that showed KEISHA, so she presents for sleep evaluation.      She states bed time around 1130 PM . It takes  few min to 1 hour to fall asleep and leaves bed around 730 AM. She wakes up sometimes 3 times a night.  She is sleepy and fatigued during the daytime.  She admits to tossing and turning at night.  She denies nightmares, sleep talking or sleep walking.  She  denies AM headaches.  She denies symptoms sleep attacks, hypnagogic hallucinations, sleep paralysis, or cataplexy.    The patient denies kicking legs at night.         She drinks  2  caffeine cans of soda off and on       She has pets  1 cat that does sleep in bed.         Hx of tobacco use: She  reports that she has never smoked. She has been exposed to tobacco smoke. She has never used smokeless tobacco.    Past Medical History:   Diagnosis Date    ALCOHOL USE     very rare    Anxiety     Depression     Essential hypertension     Hyperlipidemia     Hypothyroidism     Obesity     Pneumonia due to organism     once as a baby/ once as adult      Past Surgical History:   Procedure Laterality Date    CARPAL TUNNEL RELEASE Bilateral     COLONOSCOPY      X2    COLONOSCOPY      D & C      DILATION/CURETTAGE,DIAGNOSTIC      HERNIA SURGERY  09/22/2017    Double hernia, umbilical and ventral    HYSTERECTOMY  10/2013    Ovaries Remain    OTHER SURGICAL HISTORY  02/13/2020    Anal Exam Under Anesthesia Transanal Excision of Anal Canal Polyp    TONSILLECTOMY       Allergies    Allergen Reactions    Adhesive Tape RASH     Current Outpatient Medications   Medication Sig Dispense Refill    LOSARTAN 100 MG Oral Tab TAKE 1 TABLET BY MOUTH EVERY DAY AT NIGHT 90 tablet 0    Phentermine HCl 37.5 MG Oral Tab Take 1 tablet (37.5 mg total) by mouth before breakfast. 30 tablet 1    folic acid 1 MG Oral Tab Take 1 tablet (1 mg total) by mouth daily. 90 tablet 0    guaiFENesin-codeine (CHERATUSSIN AC) 100-10 MG/5ML Oral Solution Take 5 mL by mouth nightly as needed for cough. 118 mL 0    AMLODIPINE 10 MG Oral Tab TAKE 1 TABLET BY MOUTH AT NIGHT 90 tablet 0    levothyroxine 100 MCG Oral Tab TAKE 1 TABLET BY MOUTH EVERY DAY 90 tablet 3    escitalopram 10 MG Oral Tab TAKE 1 TABLET BY MOUTH EVERY DAY 90 tablet 1    metFORMIN 500 MG Oral Tab TAKE 1 TABLET BY MOUTH EVERY DAY WITH BREAKFAST 90 tablet 3    ALPRAZolam 0.25 MG Oral Tab Take 1 tablet (0.25 mg total) by mouth 2 (two) times daily as needed for Anxiety. 20 tablet 1    Cholecalciferol (VITAMIN D) 50 MCG (2000 UT) Oral Cap Take by mouth.      omega-3 fatty acids 1000 MG Oral Cap Take 2,000 mg by mouth daily.        Social History     Socioeconomic History    Marital status:    Occupational History    Occupation:      Employer: [employer]     Comment: [employer]    Occupation: [employer]   Tobacco Use    Smoking status: Never     Passive exposure: Past (Father smoked in home when pt was a child)    Smokeless tobacco: Never   Vaping Use    Vaping Use: Never used   Substance and Sexual Activity    Alcohol use: Yes     Comment: very rare    Drug use: No   Other Topics Concern    Caffeine Concern Yes    Exercise Yes    Seat Belt Yes    Special Diet No    Stress Concern Yes    Weight Concern Yes     Social Determinants of Health     Financial Resource Strain: Low Risk  (12/11/2023)    Financial Resource Strain     Difficulty of Paying Living Expenses: Not hard at all     Med Affordability: No   Food  Insecurity: No Food Insecurity (12/11/2023)    Food Insecurity     Food Insecurity: Never true   Transportation Needs: No Transportation Needs (12/11/2023)    Transportation Needs     Lack of Transportation: No   Physical Activity: Inactive (12/11/2023)    Exercise Vital Sign     Days of Exercise per Week: 0 days     Minutes of Exercise per Session: 0 min   Stress: Stress Concern Present (12/11/2023)    Stress     Feeling of Stress : Yes   Social Connections: Somewhat Isolated (12/11/2023)    Social Connections     Frequency of Socialization with Friends and Family: 1   Housing Stability: Low Risk  (12/11/2023)    Housing Stability     Housing Instability: No      Immunization History   Administered Date(s) Administered    Covid-19 Vaccine Pfizer 30 mcg/0.3 ml 03/16/2021, 04/06/2021, 10/22/2021    TDAP 02/22/2018      Family History   Problem Relation Age of Onset    Heart Disorder Father     Stroke Father     Other (COPD) Father     Diabetes Mother         Diet Controlled    Hypertension Mother     Thyroid Disorder Mother     Cancer Mother         Lung/Skin/Thyroid    Lung Disorder Mother         Tur[place] on lung    Heart Disorder Mother         Dec 27, 2017    Cancer Maternal Grandmother         Liver    Heart Disorder Maternal Grandfather     Stroke Maternal Grandfather     Diabetes Paternal Grandmother     Heart Attack Paternal Grandmother     Heart Disorder Paternal Grandmother     Stroke Paternal Grandmother     Stroke Paternal Grandfather     Other (Brain Tumor) Paternal Grandfather     Infectious Disease Sister     Other (Prothrombin Gene Mutation) Sister     Other (Blood Clot) Sister     Obesity Sister     Pulmonary Disease Sister         Prothrombin Gene Mutation    Other (Prothrombin Gene Mutation) Sister     Pulmonary Disease Sister         Prothrombin Gene Mutation        Review of Systems   Constitutional:  Positive for fatigue. Negative for fever and unexpected weight change.   HENT:  Negative for  congestion, mouth sores, nosebleeds, postnasal drip, rhinorrhea, sore throat and trouble swallowing.    Eyes:  Negative for visual disturbance.   Respiratory:  Negative for apnea, cough, choking, chest tightness, shortness of breath and wheezing.    Cardiovascular:  Negative for chest pain, palpitations and leg swelling.   Gastrointestinal:  Negative for abdominal pain, constipation, diarrhea, nausea and vomiting.   Genitourinary:  Negative for difficulty urinating.   Musculoskeletal:  Positive for back pain. Negative for arthralgias, gait problem and myalgias.   Neurological:  Negative for dizziness, weakness and headaches.   Psychiatric/Behavioral:  Positive for sleep disturbance.         Vitals:    01/24/24 1153   BP: 132/80   Pulse: 86   Resp: 16      SpO2: 95 %  Ht Readings from Last 1 Encounters:   01/24/24 5' 6\" (1.676 m)     Wt Readings from Last 1 Encounters:   01/24/24 269 lb (122 kg)     Body mass index is 43.42 kg/m².     Physical Exam  Constitutional:       General: She is not in acute distress.     Appearance: Normal appearance. She is obese. She is not ill-appearing or diaphoretic.   HENT:      Head: Normocephalic and atraumatic.      Nose: Nose normal. No congestion or rhinorrhea.      Comments: Narrow nares bilaterally      Mouth/Throat:      Mouth: Mucous membranes are moist.      Pharynx: Oropharynx is clear. No oropharyngeal exudate or posterior oropharyngeal erythema.      Comments: Mallampati class IV palate   Eyes:      Extraocular Movements: Extraocular movements intact.      Pupils: Pupils are equal, round, and reactive to light.   Cardiovascular:      Rate and Rhythm: Normal rate.      Pulses: Normal pulses.      Heart sounds: Normal heart sounds. No murmur heard.  Pulmonary:      Effort: Pulmonary effort is normal. No respiratory distress.      Breath sounds: Normal breath sounds. No wheezing or rhonchi.   Chest:      Chest wall: No tenderness.   Abdominal:      General: Abdomen is flat.  Bowel sounds are normal.      Palpations: Abdomen is soft.   Musculoskeletal:         General: Normal range of motion.   Skin:     General: Skin is warm.   Neurological:      General: No focal deficit present.      Mental Status: She is alert and oriented to person, place, and time.   Psychiatric:         Mood and Affect: Mood normal.         Behavior: Behavior normal.         Thought Content: Thought content normal.         Judgment: Judgment normal.             Labs:  Last BMP  Lab Results   Component Value Date    GLU 92 07/08/2023    BUN 13 07/08/2023    CREATSERUM 0.93 07/08/2023    BUNCREA NOT APPLICABLE 03/08/2022    ANIONGAP 8 07/08/2023    GFRAA 89 03/08/2022    GFRNAA 77 03/08/2022    CA 9.5 07/08/2023     07/08/2023    K 4.3 07/08/2023     07/08/2023    CO2 25.0 07/08/2023    OSMOCALC 288 07/08/2023      Last CBC  Lab Results   Component Value Date    WBC 9.3 07/08/2023    RBC 4.95 07/08/2023    HGB 14.8 07/08/2023    HCT 44.4 07/08/2023    MCV 89.7 07/08/2023    MCH 29.9 07/08/2023    MCHC 33.3 07/08/2023    RDW 12.9 07/08/2023    .0 07/08/2023      Last CMP  Lab Results   Component Value Date    GLU 92 07/08/2023    BUN 13 07/08/2023    BUNCREA NOT APPLICABLE 03/08/2022    CREATSERUM 0.93 07/08/2023    ANIONGAP 8 07/08/2023    GFR 87 09/01/2017    GFRNAA 77 03/08/2022    GFRAA 89 03/08/2022    CA 9.5 07/08/2023    OSMOCALC 288 07/08/2023    ALKPHO 97 07/08/2023    AST 33 07/08/2023    ALT 36 07/08/2023    BILT 0.4 07/08/2023    TP 7.7 07/08/2023    ALB 3.6 07/08/2023    GLOBULIN 4.1 07/08/2023    AGRATIO 1.3 03/08/2022     07/08/2023    K 4.3 07/08/2023     07/08/2023    CO2 25.0 07/08/2023      Last Thyroid Function  Lab Results   Component Value Date    T4F 1.0 02/20/2019    TSH 0.800 07/08/2023    TSHT4 0.89 06/17/2021        Imaging:  None       Study Type: Diagnostic   Procedure: The Polysomnogram was performed with a sleep technologist in attendance at the [place]  [facility]. The following parameters were monitored: central, occipital and temporal EEG, EOG, submentalis EMG, nasal flow, nasal pressure, respiratory inductance plethysmography and oxygen saturation via continuous pulse oximetry. Sleep stages, periodic limb movements and EEG arousals were scored in accordance with the American Academy of Sleep Medicine Manual for the Scoring of Sleep and Associated Events. Apnea Hypopnea Index was calculated using the recommended definition of hypopnea for scoring respiratory events. Data acquisition, collection and scoring have been validated and clinically correlated.   Sleep History: BENITA ANDINO is a 53 year old Female referred by QUINCY KRAFT for the evaluation of suspected obstructive sleep apnea.   Past Medical History is pertinent for Hypersomnia, Hypertension, Diabetes Mellitus, Anxiety, Depresson, Obesity.   At the time of the study, the patient was prescribed the following medications: Phentemine HCI, Folic Acid, Levothyroxine, Escitalopram, Amlodipine, Metformin, Losartan, Alprazolam, Vitamin D, Omega-3.   The patient was studied from 09:26:26 PM to 04:52:22 AM, with a total recording time of 445.9, total sleep time of 371.0 and a sleep efficiency of 83.2%. Wake after sleep onset was 60.5 minutes. The percentage of total sleep time by sleep stage is as follows: Stage 1 7.3%, Stage 2 78.0%, Stage 3 6.1% and Stage REM 8.6%.   Respiratory Analysis: The Apnea-Hypopnea Index (AHI) was 20.5 events per hour. REM related AHI was 33.8 events per hour. Supine related AHI was 22.2. Lateral related AHI was 9.8. The Central Apnea Index was 0. The oxygen saturation mary was 81.0% and patient spent 0.9% with saturations less than 88%.   Snoring Profile: Snoring was moderate.   Cardiac Profile: Electrocardiogram demonstrated normal sinus rhythm.   EEG Profile: Based on the limited recording montage, there were no significant EEG abnormalities noted. There were 105  arousals, with a total arousal index of 17.0.   Periodic Limb Movements: PLM index of 0. PLM arousal index of 0.   IMPRESSIONS: This study demonstrates moderate obstructive sleep apnea with desaturations.. The KEISHA becomes severe in REM.   Diagnosis: Obstructive Sleep Apnea G47.33   RECOMMENDATIONS:   1. It is recommended to proceed with CPAP titration.   2. The patient should avoid alcohol and sedative medications, as these may worsen severity of symptoms.   3. The patient should avoid drowsy driving.   4. Patient to follow up with the referring physician.     Thank you for allowing me to participate in this patient's care. Please do not hesitate to contact me with any additional questions.   Dictated by: Dr. Leger   Electronically signed by Uzair Leger MD   (Electronic Signature)   Board Certified in Sleep Medicine      This is a 53 year old female who presents with the following symptoms, risk factors, behaviors or other items associated with sleep problems.     Sleep Apnea:   snoring; coughing; restless sleep; overweight; high blood pressure; family member with sleep apnea; previous sleep study; previous CPAP use  Insomnia:  difficulty falling asleep; restless sleep; mind racing; depresssion; anxiety; job stress  Restless Leg:  urge to move legs when trying to sleep; tingling or crawly feeling in the legs  Parasomnias:   no symptoms of parasomnias  Daytime Problems:  previous sleep study                [place] Sleepiness Scale: (ESS) score on today's visit is 4  out of 24.     Score total of 1-6    Normal sleep   Score total of 7-8    Average sleepiness   Score total of 9-24    Abnormal (possibly pathologic) sleepiness       Impression:    Obstructive sleep apnea syndrome (OSAS): Attended sleep study performed on 12/13/2023 showed  Apnea-Hypopnea Index (AHI) was 20.5 events per hour. REM related AHI was 33.8 events per hour. Supine related AHI was 22.2. Lateral related AHI was 9.8. The Central Apnea Index was  0. The oxygen saturation mary was 81.0% and patient spent 0.9% with saturations less than 88%.   Daytime hypersomnolence/fatigue  Obesity: Class IV ;  Body mass index is 43.42 kg/m².  Anxiety   Depression  Hyperlipidemia   Hypothyroidism                               Plan:    Schedule CPAP titration sleep study to be interpreted by Dr. Nikko Casas, will then arrange a NEW CPAP machine   Advised about weight loss   Advised against drowsy driving and to avoid alcoholic beverage and respiratory depressants as these may worsen sleep apnea    Follow up:  3  months     Thank you for allowing me to participate in your patient care.    JASVIR Casas MD, FACP, FCCP, FAA - Pulmonary/Critical care/Sleep Medicine  Please contact our office if you have any questions or concerns at [phone]

## 2024-01-24 NOTE — PROGRESS NOTES
This is a 53 year old female who presents with the following symptoms, risk factors, behaviors or other items associated with sleep problems.    Sleep Apnea:   snoring; coughing; restless sleep; overweight; high blood pressure; family member with sleep apnea; previous sleep study; previous CPAP use  Insomnia:  difficulty falling asleep; restless sleep; mind racing; depresssion; anxiety; job stress  Restless Leg:  urge to move legs when trying to sleep; tingling or crawly feeling in the legs  Parasomnias:   no symptoms of parasomnias  Daytime Problems:  previous sleep study    The patient's Bear Sleepiness score is 4/24.

## 2024-01-24 NOTE — PATIENT INSTRUCTIONS
Plan:    Schedule CPAP titration sleep study to be interpreted by Dr. Nikko Casas, will then arrange a NEW CPAP machine   Advised about weight loss   Advised against drowsy driving and to avoid alcoholic beverage and respiratory depressants as these may worsen sleep apnea    Follow up:  3  months     Nikko Casas MD        Obstructive Sleep Apnea  Obstructive sleep apnea is a condition caused by air passages becoming narrowed or blocked during sleep. As a result, breathing stops for short periods. Your body wakes up enough for breathing to start again. But you don't remember it. The cycle of stopped breathing and brief awakenings can repeat dozens of times a night. This prevents the body from getting to the deeper stages of sleep that are needed for good rest.   Signs of sleep apnea include loud snoring, noisy breathing, and gasping sounds during sleep. People with sleep apnea often find they use the bathroom many times during the night. Daytime symptoms include waking up tired after a full night's sleep and waking up with headaches. They can also include feeling very sleepy or falling asleep during the day, and having problems with memory or concentration.   Risk factors for sleep apnea include:  Being overweight  Being assigned male at birth, or being in menopause  Smoking  Using alcohol or sedating medicines  Having enlarged structures in the nose or throat such as enlarged tonsils or adenoids, or extra tissue in the airway  Home care  Lifestyle changes that can help treat snoring and sleep apnea include:   If you're overweight, talk with your healthcare provider about a weight-loss plan for you.  Don't drink alcohol for 3 to 4 hours before bedtime.  Don't take sedating medicines. Ask your healthcare provider about the medicines you take.  If you smoke, talk to your provider about ways to quit. It's important to stay away from secondhand smoke. Don't use e-cigarettes because of their harmful side  effects.  Sleep on your side. This can help prevent gravity from pulling relaxed throat tissues into your breathing passages.  If you have allergies or sinus problems that block your nose, ask your provider for help.  Use positive airway pressure (PAP). Discuss with your provider the benefits of using PAP at home. And talk about the type of PAP that's best for you.  Follow-up care  Follow up with your healthcare provider, or as advised. A diagnosis of sleep apnea is made with a sleep study. Your provider can tell you more about this test.   When to get medical care  See your healthcare provider if you have daytime symptoms of sleep apnea. These include:   Waking up tired after a full night's sleep  Waking up with a headache  Feeling very sleepy or falling asleep during the day  Having problems with memory or concentration  Also talk with your provider if your partner tells you that you snore, gasp for air, or stop breathing while you sleep.   Seeing your provider is important because sleep apnea can make you more likely to have certain health problems. These include high blood pressure, heart attack, stroke, and sexual dysfunction. If you have sleep apnea, talk with your healthcare provider about the best treatments for you.   Ronald last reviewed this educational content on 5/1/2022 © 2000-2023 [employer]. All rights reserved. This information is not intended as a substitute for professional medical care. Always follow your healthcare professional's instructions.        Continuous Positive Air Pressure (CPAP)     A mask over the nose gently directs air into the throat to keep the airway open.     Continuous positive air pressure (CPAP) uses gentle air pressure to hold the airway open. CPAP is often the most effective treatment for sleep apnea. It works very well as a treatment for adults diagnosed with obstructive sleep apnea with a lot of sleepiness. But keep in mind that it can take several  adjustments before the setup is right for you.   How CPAP works  The CPAP machine  is a small portable pump that sits beside the bed. The pump sends air through a hose, which is held over your nose alone, or nose and mouth by a mask. Mild air pressure is gently pushed through your airway. The air pressure nudges sagging tissues aside. This widens the airway so you can breathe better. CPAP may be combined with other kinds of therapy for sleep apnea.   Types of air pressure treatments  There are different types of CPAP. Your doctor or CPAP technician will help you decide which type is best for you:   Basic CPAP keeps the pressure constant all night long.  A bilevel device (BiPAP) provides more pressure when you breathe in and less when you breathe out. A BiPAP machine also may be set to provide automatic breaths to maintain breathing if you stop breathing while sleeping.  An autoCPAP device automatically adjusts pressure throughout the night and in response to changes such as body position, sleep stage, and snoring.  [employer] last reviewed this educational content on 7/1/2019 © 2000-2023 [employer]. All rights reserved. This information is not intended as a substitute for professional medical care. Always follow your healthcare professional's instructions.

## 2024-02-01 ENCOUNTER — OFFICE VISIT (OUTPATIENT)
Dept: INTERNAL MEDICINE CLINIC | Facility: CLINIC | Age: 54
End: 2024-02-01
Payer: COMMERCIAL

## 2024-02-01 VITALS
HEART RATE: 96 BPM | SYSTOLIC BLOOD PRESSURE: 128 MMHG | BODY MASS INDEX: 42.91 KG/M2 | DIASTOLIC BLOOD PRESSURE: 80 MMHG | WEIGHT: 267 LBS | HEIGHT: 66 IN | RESPIRATION RATE: 16 BRPM

## 2024-02-01 DIAGNOSIS — E11.9 DIET-CONTROLLED DIABETES MELLITUS (HCC): Primary | ICD-10-CM

## 2024-02-01 DIAGNOSIS — G47.33 OSA (OBSTRUCTIVE SLEEP APNEA): ICD-10-CM

## 2024-02-01 DIAGNOSIS — Z51.81 THERAPEUTIC DRUG MONITORING: ICD-10-CM

## 2024-02-01 DIAGNOSIS — I10 ESSENTIAL HYPERTENSION: ICD-10-CM

## 2024-02-01 PROCEDURE — 99214 OFFICE O/P EST MOD 30 MIN: CPT | Performed by: INTERNAL MEDICINE

## 2024-02-01 RX ORDER — PHENTERMINE HYDROCHLORIDE 37.5 MG/1
37.5 TABLET ORAL
Qty: 30 TABLET | Refills: 1 | Status: CANCELLED | OUTPATIENT
Start: 2024-02-01

## 2024-02-01 NOTE — PROGRESS NOTES
HISTORY OF PRESENT ILLNESS  Patient presents with:  Weight Check: UP 1 lb         Soheila Morley is a 53 year old female here for follow up in medical weight loss program.     Denies chest pain, shortness of breath, dizziness, blurred vision, headache, paresthesia, nausea/vomiting.     Up 1 lb from previous   Struggling with being sick over the month of Yong   Struggling with challenge around the weight loss   The lst 2 weeks really struggling mentally with weight loss.   Struggling with more menopausal symptoms   More irritability and more sleep issues       Wt Readings from Last 6 Encounters:   02/01/24 267 lb (121.1 kg)   01/24/24 269 lb (122 kg)   11/15/23 265 lb (120.2 kg)   10/13/23 258 lb 9.6 oz (117.3 kg)   07/12/23 263 lb (119.3 kg)   07/12/23 263 lb (119.3 kg)            Breakfast Lunch Dinner Snacks Fluids   Reviewed            REVIEW OF SYSTEMS  GENERAL HEALTH: feels well otherwise, denied any fevers chills or night sweats   RESPIRATORY: denies shortness of breath   CARDIOVASCULAR: denies chest pain  GI: denies abdominal pain    EXAM  /80   Pulse 96   Resp 16   Ht 5' 6\" (1.676 m)   Wt 267 lb (121.1 kg)   BMI 43.09 kg/m²   GENERAL: well developed, well nourished,in no apparent distress, A/O x3  SKIN: no rashes,no suspicious lesions  HEENT: atraumatic, normocephalic, OP-clear, PERRL  NECK: supple,no adenopathy  LUNGS: clear to auscultation bilaterally   CARDIO: RRR without murmur  GI: good BS's,NT/ND, no masses or HSM  EXTREMITIES: no cyanosis, no clubbing, no edema    Lab Results   Component Value Date    WBC 9.3 07/08/2023    RBC 4.95 07/08/2023    HGB 14.8 07/08/2023    HCT 44.4 07/08/2023    MCV 89.7 07/08/2023    MCH 29.9 07/08/2023    MCHC 33.3 07/08/2023    RDW 12.9 07/08/2023    .0 07/08/2023     Lab Results   Component Value Date    GLU 92 07/08/2023    BUN 13 07/08/2023    BUNCREA NOT APPLICABLE 03/08/2022    CREATSERUM 0.93 07/08/2023    ANIONGAP 8 07/08/2023    GFR 87  09/01/2017    GFRNAA 77 03/08/2022    GFRAA 89 03/08/2022    CA 9.5 07/08/2023    OSMOCALC 288 07/08/2023    ALKPHO 97 07/08/2023    AST 33 07/08/2023    ALT 36 07/08/2023    BILT 0.4 07/08/2023    TP 7.7 07/08/2023    ALB 3.6 07/08/2023    GLOBULIN 4.1 07/08/2023    AGRATIO 1.3 03/08/2022     07/08/2023    K 4.3 07/08/2023     07/08/2023    CO2 25.0 07/08/2023     Lab Results   Component Value Date     07/08/2023    A1C 5.3 07/08/2023     Lab Results   Component Value Date    CHOLEST 180 07/08/2023    TRIG 157 (H) 07/08/2023    HDL 48 07/08/2023     (H) 07/08/2023    VLDL 26 07/08/2023    TCHDLRATIO 4.3 03/08/2022    NONHDLC 132 (H) 07/08/2023     Lab Results   Component Value Date    T4F 1.0 02/20/2019    TSH 0.800 07/08/2023    TSHT4 0.89 06/17/2021     Lab Results   Component Value Date    VITB12 352 10/09/2021     Lab Results   Component Value Date    VITD 42 10/09/2021       Current Outpatient Medications on File Prior to Visit   Medication Sig Dispense Refill    LOSARTAN 100 MG Oral Tab TAKE 1 TABLET BY MOUTH EVERY DAY AT NIGHT 90 tablet 0    Phentermine HCl 37.5 MG Oral Tab Take 1 tablet (37.5 mg total) by mouth before breakfast. 30 tablet 1    folic acid 1 MG Oral Tab Take 1 tablet (1 mg total) by mouth daily. 90 tablet 0    guaiFENesin-codeine (CHERATUSSIN AC) 100-10 MG/5ML Oral Solution Take 5 mL by mouth nightly as needed for cough. 118 mL 0    AMLODIPINE 10 MG Oral Tab TAKE 1 TABLET BY MOUTH AT NIGHT 90 tablet 0    levothyroxine 100 MCG Oral Tab TAKE 1 TABLET BY MOUTH EVERY DAY 90 tablet 3    escitalopram 10 MG Oral Tab TAKE 1 TABLET BY MOUTH EVERY DAY 90 tablet 1    metFORMIN 500 MG Oral Tab TAKE 1 TABLET BY MOUTH EVERY DAY WITH BREAKFAST 90 tablet 3    ALPRAZolam 0.25 MG Oral Tab Take 1 tablet (0.25 mg total) by mouth 2 (two) times daily as needed for Anxiety. 20 tablet 1    Cholecalciferol (VITAMIN D) 50 MCG (2000 UT) Oral Cap Take by mouth.      omega-3 fatty acids 1000 MG  Oral Cap Take 2,000 mg by mouth daily.       No current facility-administered medications on file prior to visit.       ASSESSMENT  Analyzed weight data:       Diagnoses and all orders for this visit:    Diet-controlled diabetes mellitus (HCC)    KEISHA (obstructive sleep apnea)    Therapeutic drug monitoring    Essential hypertension            PLAN  Initital consult: 312 lb   UP 1  lb   Down 44 lb  Total time spent on chart review, pre-charting, obtaining history, counseling, and educating, reviewing labs was 30 minutes. Spent time at length discussed goal set and motivation and staying on track.    OFF ozempic did not tolerate   Will recheck family history of type   No surgery benefits   Wean off phentemrine not noticing improvement   -advised of side effects and adverse effects of this medication  Reviwed contrave as next option   Would like to hold on medication at this time.  Monitor for worsening anxiety sx   Reviewed normal stress testing  No further zenpep  -advised of side effects and adverse effects of this medication  Sleep study completed and titration testing.     Nutrition: low carb diet/ recommended to eat breakfast daily/ regular protein intake  Medication use and side effects reviewed with patient.  Medication contraindications: n/a   Blood pressure stable   Start exercise as tolerated  Follow up with dietitian and psychologist as recommended.  Discussed the role of sleep and stress in weight management.  Counseled on comprehensive weight loss plan including attention to nutrition, exercise and behavior/stress management for success. See patient instruction below for more details.  Discussed strategies to overcome barriers to successful weight loss and weight maintenance  FITTE: ACSM recommendations (150-300 minutes/ week in active weight loss)   Weight Loss consent to treat reviewed and signed     There are no Patient Instructions on file for this visit.    No follow-ups on file.    Patient  verbalizes understanding.    Rhea Morris MD      Answers for HPI/ROS submitted by the patient on 12/19/2021  If greater than 5/1O how would you grade your coping mechanisms?: fair

## 2024-03-12 RX ORDER — ESCITALOPRAM OXALATE 10 MG/1
10 TABLET ORAL DAILY
Qty: 90 TABLET | Refills: 1 | Status: SHIPPED | OUTPATIENT
Start: 2024-03-12

## 2024-03-12 RX ORDER — AMLODIPINE BESYLATE 10 MG/1
10 TABLET ORAL NIGHTLY
Qty: 90 TABLET | Refills: 1 | Status: SHIPPED | OUTPATIENT
Start: 2024-03-12

## 2024-03-12 NOTE — TELEPHONE ENCOUNTER
LOV: 7/12/23   RTC: 4 weeks   Labs: 7/8/23   Amlodipine 10mg filled 12/14/23 #90 0 REFILL   Escitalopram 10mg filled 9/12/23 # 90 1 refill   Future Appointments   Date Time Provider Department Center   3/22/2024 10:00 PM SCHEDULE BY DATE [facility]   5/15/2024  8:20 AM Rhea Morris MD [facility] [address]   7/15/2024  7:30 AM Hope Yao MD [facility] [place]

## 2024-04-13 ENCOUNTER — OFFICE VISIT (OUTPATIENT)
Dept: SLEEP CENTER | Age: 54
End: 2024-04-13
Attending: INTERNAL MEDICINE
Payer: COMMERCIAL

## 2024-04-13 DIAGNOSIS — G47.19 DAYTIME HYPERSOMNOLENCE: ICD-10-CM

## 2024-04-13 DIAGNOSIS — E66.01 CLASS 3 SEVERE OBESITY DUE TO EXCESS CALORIES WITH SERIOUS COMORBIDITY AND BODY MASS INDEX (BMI) OF 45.0 TO 49.9 IN ADULT (HCC): ICD-10-CM

## 2024-04-13 DIAGNOSIS — G47.33 OSA (OBSTRUCTIVE SLEEP APNEA): ICD-10-CM

## 2024-04-13 PROCEDURE — 95811 POLYSOM 6/>YRS CPAP 4/> PARM: CPT

## 2024-04-17 DIAGNOSIS — E11.9 DIET-CONTROLLED DIABETES MELLITUS (HCC): ICD-10-CM

## 2024-04-18 ENCOUNTER — TELEPHONE (OUTPATIENT)
Facility: CLINIC | Age: 54
End: 2024-04-18

## 2024-04-18 ENCOUNTER — SLEEP STUDY (OUTPATIENT)
Facility: CLINIC | Age: 54
End: 2024-04-18
Payer: COMMERCIAL

## 2024-04-18 DIAGNOSIS — G47.30 SLEEP APNEA, UNSPECIFIED TYPE: Primary | ICD-10-CM

## 2024-04-18 PROCEDURE — 95811 POLYSOM 6/>YRS CPAP 4/> PARM: CPT | Performed by: INTERNAL MEDICINE

## 2024-04-20 RX ORDER — FOLIC ACID 1 MG/1
1 TABLET ORAL DAILY
Qty: 90 TABLET | Refills: 0 | Status: SHIPPED | OUTPATIENT
Start: 2024-04-20

## 2024-04-20 NOTE — TELEPHONE ENCOUNTER
Requesting   Requested Prescriptions     Pending Prescriptions Disp Refills    FOLIC ACID 1 MG Oral Tab [Pharmacy Med Name: Folic Acid Oral Tablet 1 MG] 90 tablet 0     Sig: TAKE 1 TABLET BY MOUTH EVERY DAY       LOV: 2/1/24  RTC:   Last Relevant Labs:   Filled: 1/17/24 #90 with 0 refills    Future Appointments   Date Time Provider Department Center   5/15/2024  8:20 AM Rhea Morris MD [facility]   7/15/2024  7:30 AM Hope Yao MD [facility] [place]   9/19/2024  8:20 AM Rhea Morris MD [facility]   12/12/2024  8:20 AM Rhea Morris MD [facility]

## 2024-05-08 RX ORDER — LOSARTAN POTASSIUM 100 MG/1
100 TABLET ORAL NIGHTLY
Qty: 90 TABLET | Refills: 3 | Status: SHIPPED | OUTPATIENT
Start: 2024-05-08

## 2024-05-08 NOTE — TELEPHONE ENCOUNTER
Losartan Potassium Oral Tablet 100 MG          Will file in chart as: LOSARTAN 100 MG Oral Tab    Sig: TAKE 1 TABLET BY MOUTH AT NIGHT    Disp: 90 tablet    Refills: 0    Start: 5/6/2024    Class: Normal    Non-formulary    Last ordered: 3 months ago (1/17/2024) by Hope Yao MD    Last refill: 10/13/2023    Rx #: [id number]    Hypertension Medications Protocol [id number]/06/2024 08:55 PM   Protocol Details CMP or BMP in past 12 months    Last BP reading less than 140/90    In person appointment or virtual visit in the past 12 mos or appointment in next 3 mos    EGFRCR or GFRNAA > 50      LOV 10/13/23  Filled 1/17/24 90 tabs 0 refills   Last labs 7/8/23  Future Appointments   Date Time Provider Department Center   5/15/2024  8:20 AM Rhea Morris MD [facility]   7/15/2024  7:30 AM Hope Yao MD [facility] [place]   9/19/2024  8:20 AM Rhea Morris MD [facility]   12/12/2024  8:20 AM Rhea Morris MD [facility]

## 2024-05-14 NOTE — PROGRESS NOTES
HISTORY OF PRESENT ILLNESS  Patient presents with:  Chief Complaint   Patient presents with    Weight Check     Up 15             Soheila Morley is a 53 year old female here for follow up in medical weight loss program.     Denies chest pain, shortness of breath, dizziness, blurred vision, headache, paresthesia, nausea/vomiting.   Was able to cut out soda for LENT but struggled with transference for cravings of other foods.   Weaned off the phentermine and craving are back ten fold   Now also off ozempic   Struggling with balance and all or none concept   Would like to stay off injectable   Wants to focus on behavior changes      Wt Readings from Last 6 Encounters:   05/15/24 282 lb (127.9 kg)   02/01/24 267 lb (121.1 kg)   01/24/24 269 lb (122 kg)   11/15/23 265 lb (120.2 kg)   10/13/23 258 lb 9.6 oz (117.3 kg)   07/12/23 263 lb (119.3 kg)            Breakfast Lunch Dinner Snacks Fluids   Reviewed            REVIEW OF SYSTEMS  GENERAL HEALTH: feels well otherwise, denied any fevers chills or night sweats   RESPIRATORY: denies shortness of breath   CARDIOVASCULAR: denies chest pain  GI: denies abdominal pain    EXAM  /82   Pulse 78   Resp 16   Ht 5' 6\" (1.676 m)   Wt 282 lb (127.9 kg)   BMI 45.52 kg/m²   GENERAL: well developed, well nourished,in no apparent distress, A/O x3  SKIN: no rashes,no suspicious lesions  HEENT: atraumatic, normocephalic, OP-clear, PERRL  NECK: supple,no adenopathy  LUNGS: clear to auscultation bilaterally   CARDIO: RRR without murmur  GI: good BS's,NT/ND, no masses or HSM  EXTREMITIES: no cyanosis, no clubbing, no edema    Lab Results   Component Value Date    WBC 9.3 07/08/2023    RBC 4.95 07/08/2023    HGB 14.8 07/08/2023    HCT 44.4 07/08/2023    MCV 89.7 07/08/2023    MCH 29.9 07/08/2023    MCHC 33.3 07/08/2023    RDW 12.9 07/08/2023    .0 07/08/2023     Lab Results   Component Value Date    GLU 92 07/08/2023    BUN 13 07/08/2023    BUNCREA NOT APPLICABLE  03/08/2022    CREATSERUM 0.93 07/08/2023    ANIONGAP 8 07/08/2023    GFR 87 09/01/2017    GFRNAA 77 03/08/2022    GFRAA 89 03/08/2022    CA 9.5 07/08/2023    OSMOCALC 288 07/08/2023    ALKPHO 97 07/08/2023    AST 33 07/08/2023    ALT 36 07/08/2023    BILT 0.4 07/08/2023    TP 7.7 07/08/2023    ALB 3.6 07/08/2023    GLOBULIN 4.1 07/08/2023    AGRATIO 1.3 03/08/2022     07/08/2023    K 4.3 07/08/2023     07/08/2023    CO2 25.0 07/08/2023     Lab Results   Component Value Date     07/08/2023    A1C 5.3 07/08/2023     Lab Results   Component Value Date    CHOLEST 180 07/08/2023    TRIG 157 (H) 07/08/2023    HDL 48 07/08/2023     (H) 07/08/2023    VLDL 26 07/08/2023    TCHDLRATIO 4.3 03/08/2022    NONHDLC 132 (H) 07/08/2023     Lab Results   Component Value Date    T4F 1.0 02/20/2019    TSH 0.800 07/08/2023    TSHT4 0.89 06/17/2021     Lab Results   Component Value Date    VITB12 352 10/09/2021     Lab Results   Component Value Date    VITD 42 10/09/2021       Current Outpatient Medications on File Prior to Visit   Medication Sig Dispense Refill    losartan 100 MG Oral Tab TAKE 1 TABLET BY MOUTH AT NIGHT 90 tablet 3    folic acid 1 MG Oral Tab Take 1 tablet (1 mg total) by mouth daily. 90 tablet 0    amLODIPine 10 MG Oral Tab Take 1 tablet (10 mg total) by mouth nightly. 90 tablet 1    escitalopram 10 MG Oral Tab Take 1 tablet (10 mg total) by mouth daily. 90 tablet 1    ALPRAZolam 0.25 MG Oral Tab Take 1 tablet (0.25 mg total) by mouth 2 (two) times daily as needed for Anxiety. 20 tablet 1    levothyroxine 100 MCG Oral Tab TAKE 1 TABLET BY MOUTH EVERY DAY 90 tablet 3    metFORMIN 500 MG Oral Tab TAKE 1 TABLET BY MOUTH EVERY DAY WITH BREAKFAST 90 tablet 3    Cholecalciferol (VITAMIN D) 50 MCG (2000 UT) Oral Cap Take by mouth.      omega-3 fatty acids 1000 MG Oral Cap Take 2,000 mg by mouth daily.      Phentermine HCl 37.5 MG Oral Tab Take 1 tablet (37.5 mg total) by mouth before breakfast.  (Patient not taking: Reported on 5/15/2024) 30 tablet 1     No current facility-administered medications on file prior to visit.       ASSESSMENT  Analyzed weight data:       Diagnoses and all orders for this visit:    Diet-controlled diabetes mellitus (HCC)  -     [facility] Weight Management Medical Nutrition Therapy DIETITIAN - [place] Location    Therapeutic drug monitoring  -     [facility] Weight Management Medical Nutrition Therapy DIETITIAN [facility] Location    Essential hypertension  -     [facility] Weight Management Medical Nutrition Therapy DIETITIAN [facility] Location    KEISHA (obstructive sleep apnea)  -     [facility] Weight Management Medical Nutrition Therapy DIETITIAN [facility] Location    Morbid obesity (HCC)  -     [facility] Weight Management Medical Nutrition Therapy DIETITIAN [facility] Location              PLAN  Initital consult: 312 lb   UP 15   Down 29 lb  Total time spent on chart review, pre-charting, obtaining history, counseling, and educating, reviewing labs was 30 minutes. Spent time at length discussed goal set and motivation and staying on track.    Reviewed retrial of medication   Resume phentermine  Hold glp1ra   Has never seen dietitian, advised huge benefits in helping her navigate diet.   Schedule for dietitian visit   Reviwed contrave as next option   Would like to hold on medication at this time.  Monitor for worsening anxiety sx   Reviewed normal stress testing  No further zenpep  -advised of side effects and adverse effects of this medication  Sleep study completed and titration testing.   KEISHA- continue present management  Nutrition: low carb diet/ recommended to eat breakfast daily/ regular protein intake  Medication use and side effects reviewed with patient.  Medication contraindications: n/a   Blood pressure stable   Start exercise as tolerated  Follow up with dietitian and psychologist as recommended.  Discussed the role of sleep and stress in weight  management.  Counseled on comprehensive weight loss plan including attention to nutrition, exercise and behavior/stress management for success. See patient instruction below for more details.  Discussed strategies to overcome barriers to successful weight loss and weight maintenance  FITTE: ACSM recommendations (150-300 minutes/ week in active weight loss)   Weight Loss consent to treat reviewed and signed     There are no Patient Instructions on file for this visit.    No follow-ups on file.    Patient verbalizes understanding.    Rhea Morris MD      Answers for HPI/ROS submitted by the patient on 12/19/2021  If greater than 5/1O how would you grade your coping mechanisms?: fair

## 2024-05-15 ENCOUNTER — OFFICE VISIT (OUTPATIENT)
Dept: INTERNAL MEDICINE CLINIC | Facility: CLINIC | Age: 54
End: 2024-05-15

## 2024-05-15 VITALS
BODY MASS INDEX: 45.32 KG/M2 | SYSTOLIC BLOOD PRESSURE: 132 MMHG | DIASTOLIC BLOOD PRESSURE: 82 MMHG | HEART RATE: 78 BPM | RESPIRATION RATE: 16 BRPM | HEIGHT: 66 IN | WEIGHT: 282 LBS

## 2024-05-15 DIAGNOSIS — E66.01 MORBID OBESITY (HCC): ICD-10-CM

## 2024-05-15 DIAGNOSIS — E11.9 DIET-CONTROLLED DIABETES MELLITUS (HCC): Primary | ICD-10-CM

## 2024-05-15 DIAGNOSIS — G47.33 OSA (OBSTRUCTIVE SLEEP APNEA): ICD-10-CM

## 2024-05-15 DIAGNOSIS — I10 ESSENTIAL HYPERTENSION: ICD-10-CM

## 2024-05-15 DIAGNOSIS — Z51.81 THERAPEUTIC DRUG MONITORING: ICD-10-CM

## 2024-05-15 PROCEDURE — 99214 OFFICE O/P EST MOD 30 MIN: CPT | Performed by: INTERNAL MEDICINE

## 2024-06-06 DIAGNOSIS — E11.9 DIET-CONTROLLED DIABETES MELLITUS (HCC): ICD-10-CM

## 2024-06-07 RX ORDER — FOLIC ACID 1 MG/1
1 TABLET ORAL DAILY
Qty: 90 TABLET | Refills: 0 | OUTPATIENT
Start: 2024-06-07

## 2024-06-07 NOTE — TELEPHONE ENCOUNTER
Requesting   Requested Prescriptions     Pending Prescriptions Disp Refills    FOLIC ACID 1 MG Oral Tab [Pharmacy Med Name: Folic Acid Oral Tablet 1 MG] 90 tablet 0     Sig: TAKE 1 TABLET BY MOUTH EVERY DAY     LOV: 5/15/24  RTC: not noted  Filled: 4/20/24 #90 with 0 refills    Future Appointments   Date Time Provider Department Center   7/15/2024  7:30 AM Hope Yao MD [facility] [place]   9/19/2024  8:20 AM Rhea Morris MD [facility]   12/12/2024  8:20 AM Rhea Morris MD [facility]     Refill too soon

## 2024-07-02 ENCOUNTER — PATIENT MESSAGE (OUTPATIENT)
Dept: INTERNAL MEDICINE CLINIC | Facility: CLINIC | Age: 54
End: 2024-07-02

## 2024-07-02 DIAGNOSIS — Z00.00 LABORATORY TESTS ORDERED AS PART OF A COMPLETE PHYSICAL EXAM (CPE): Primary | ICD-10-CM

## 2024-07-03 NOTE — TELEPHONE ENCOUNTER
From: Soheila Morley  To: Hope Yao  Sent: 7/2/2024 1:13 PM CDT  Subject: Blood Work for Physical    Hello, I have my yearly physical coming up on Monday, July 15. I'd like to have my blood work done prior to the appointment. Would you please request so I can make my appointment for the blood work? Thank you.

## 2024-07-06 ENCOUNTER — LAB ENCOUNTER (OUTPATIENT)
Dept: LAB | Age: 54
End: 2024-07-06
Attending: INTERNAL MEDICINE
Payer: COMMERCIAL

## 2024-07-06 DIAGNOSIS — Z00.00 LABORATORY TESTS ORDERED AS PART OF A COMPLETE PHYSICAL EXAM (CPE): ICD-10-CM

## 2024-07-06 LAB
ALBUMIN SERPL-MCNC: 3.7 G/DL (ref 3.4–5)
ALBUMIN/GLOB SERPL: 1 {RATIO} (ref 1–2)
ALP LIVER SERPL-CCNC: 79 U/L
ALT SERPL-CCNC: 48 U/L
ANION GAP SERPL CALC-SCNC: 7 MMOL/L (ref 0–18)
AST SERPL-CCNC: 33 U/L (ref 15–37)
BASOPHILS # BLD AUTO: 0.07 X10(3) UL (ref 0–0.2)
BASOPHILS NFR BLD AUTO: 0.8 %
BILIRUB SERPL-MCNC: 0.4 MG/DL (ref 0.1–2)
BUN BLD-MCNC: 15 MG/DL (ref 9–23)
CALCIUM BLD-MCNC: 9.1 MG/DL (ref 8.5–10.1)
CHLORIDE SERPL-SCNC: 107 MMOL/L (ref 98–112)
CHOLEST SERPL-MCNC: 182 MG/DL (ref ?–200)
CO2 SERPL-SCNC: 26 MMOL/L (ref 21–32)
CREAT BLD-MCNC: 0.92 MG/DL
EGFRCR SERPLBLD CKD-EPI 2021: 75 ML/MIN/1.73M2 (ref 60–?)
EOSINOPHIL # BLD AUTO: 0.25 X10(3) UL (ref 0–0.7)
EOSINOPHIL NFR BLD AUTO: 3 %
ERYTHROCYTE [DISTWIDTH] IN BLOOD BY AUTOMATED COUNT: 13.2 %
EST. AVERAGE GLUCOSE BLD GHB EST-MCNC: 126 MG/DL (ref 68–126)
FASTING PATIENT LIPID ANSWER: YES
FASTING STATUS PATIENT QL REPORTED: YES
GLOBULIN PLAS-MCNC: 3.7 G/DL (ref 2.8–4.4)
GLUCOSE BLD-MCNC: 128 MG/DL (ref 70–99)
HBA1C MFR BLD: 6 % (ref ?–5.7)
HCT VFR BLD AUTO: 42.2 %
HDLC SERPL-MCNC: 46 MG/DL (ref 40–59)
HGB BLD-MCNC: 13.7 G/DL
IMM GRANULOCYTES # BLD AUTO: 0.02 X10(3) UL (ref 0–1)
IMM GRANULOCYTES NFR BLD: 0.2 %
LDLC SERPL CALC-MCNC: 114 MG/DL (ref ?–100)
LYMPHOCYTES # BLD AUTO: 2.34 X10(3) UL (ref 1–4)
LYMPHOCYTES NFR BLD AUTO: 28.2 %
MCH RBC QN AUTO: 30.3 PG (ref 26–34)
MCHC RBC AUTO-ENTMCNC: 32.5 G/DL (ref 31–37)
MCV RBC AUTO: 93.4 FL
MONOCYTES # BLD AUTO: 0.59 X10(3) UL (ref 0.1–1)
MONOCYTES NFR BLD AUTO: 7.1 %
NEUTROPHILS # BLD AUTO: 5.04 X10 (3) UL (ref 1.5–7.7)
NEUTROPHILS # BLD AUTO: 5.04 X10(3) UL (ref 1.5–7.7)
NEUTROPHILS NFR BLD AUTO: 60.7 %
NONHDLC SERPL-MCNC: 136 MG/DL (ref ?–130)
OSMOLALITY SERPL CALC.SUM OF ELEC: 292 MOSM/KG (ref 275–295)
PLATELET # BLD AUTO: 223 10(3)UL (ref 150–450)
POTASSIUM SERPL-SCNC: 4.4 MMOL/L (ref 3.5–5.1)
PROT SERPL-MCNC: 7.4 G/DL (ref 6.4–8.2)
RBC # BLD AUTO: 4.52 X10(6)UL
SODIUM SERPL-SCNC: 140 MMOL/L (ref 136–145)
TRIGL SERPL-MCNC: 124 MG/DL (ref 30–149)
TSI SER-ACNC: 0.67 MIU/ML (ref 0.36–3.74)
VLDLC SERPL CALC-MCNC: 21 MG/DL (ref 0–30)
WBC # BLD AUTO: 8.3 X10(3) UL (ref 4–11)

## 2024-07-06 PROCEDURE — 80061 LIPID PANEL: CPT

## 2024-07-06 PROCEDURE — 36415 COLL VENOUS BLD VENIPUNCTURE: CPT

## 2024-07-06 PROCEDURE — 84443 ASSAY THYROID STIM HORMONE: CPT

## 2024-07-06 PROCEDURE — 80053 COMPREHEN METABOLIC PANEL: CPT

## 2024-07-06 PROCEDURE — 83036 HEMOGLOBIN GLYCOSYLATED A1C: CPT

## 2024-07-06 PROCEDURE — 85025 COMPLETE CBC W/AUTO DIFF WBC: CPT

## 2024-07-08 NOTE — PROGRESS NOTES
BENITA ANDINO  2024 - 2024  Patient ID: [id number]  : 1970  Age: 53 years  Gender: Female  WILLPARMJIT ([id number])  [place]  Compliance Report  Usage 2024 - 2024  Usage days 21/31 days (68%)  >= 4 hours 21 days (68%)  < 4 hours 0 days (0%)  Usage hours 103 hours 21 minutes  Average usage (total days) 3 hours 20 minutes  Average usage (days used) 4 hours 55 minutes  Median usage (days used) 4 hours 44 minutes  Total used hours (value since last reset - 2024) 103 hours  AirSense 11 AutoSet  Serial number [id number]  Mode AutoSet  Min Pressure 10 cmH2O  Max Pressure 20 cmH2O  EPR Ramp Only  EPR level 1  Response Standard  Therapy  Pressure - cmH2O Median: 11.8 95th percentile: 14.4 Maximum: 15.4  Leaks - L/min Median: 0.0 95th percentile: 6.7 Maximum: 23.4  Events per hour AI: 1.8 HI: 0.7 AHI: 2.5  Apnea Index Central: 0.4 Obstructive: 1.3 Unknown: 0.0  RERA Index 0.1  Cheyne-Gonsalez respiration (average duration per night) 0 minutes (0%)

## 2024-07-09 ENCOUNTER — OFFICE VISIT (OUTPATIENT)
Facility: CLINIC | Age: 54
End: 2024-07-09
Payer: COMMERCIAL

## 2024-07-09 VITALS
WEIGHT: 287 LBS | OXYGEN SATURATION: 98 % | HEIGHT: 66 IN | HEART RATE: 83 BPM | BODY MASS INDEX: 46.12 KG/M2 | RESPIRATION RATE: 16 BRPM | TEMPERATURE: 97 F | DIASTOLIC BLOOD PRESSURE: 80 MMHG | SYSTOLIC BLOOD PRESSURE: 130 MMHG

## 2024-07-09 DIAGNOSIS — E66.01 CLASS 3 SEVERE OBESITY DUE TO EXCESS CALORIES WITH SERIOUS COMORBIDITY AND BODY MASS INDEX (BMI) OF 45.0 TO 49.9 IN ADULT (HCC): Primary | ICD-10-CM

## 2024-07-09 DIAGNOSIS — G47.33 OSA (OBSTRUCTIVE SLEEP APNEA): ICD-10-CM

## 2024-07-09 DIAGNOSIS — G47.19 DAYTIME HYPERSOMNOLENCE: ICD-10-CM

## 2024-07-09 PROCEDURE — 99214 OFFICE O/P EST MOD 30 MIN: CPT | Performed by: INTERNAL MEDICINE

## 2024-07-09 NOTE — PATIENT INSTRUCTIONS
Plan:  Continue current auto CPAP 10-20 cwp as patient is using and benefiting from CPAP use  Advised patient to achieve 7-8 hours of sleep   Advised about weight loss   Advised against drowsy driving and to avoid alcoholic beverage and respiratory depressants as these may worsen sleep apnea      Follow up:  4  months       Nikko Casas MD      Obstructive Sleep Apnea  Obstructive sleep apnea is a condition caused by air passages becoming narrowed or blocked during sleep. As a result, breathing stops for short periods. Your body wakes up enough for breathing to start again. But you don't remember it. The cycle of stopped breathing and brief awakenings can repeat dozens of times a night. This prevents the body from getting to the deeper stages of sleep that are needed for good rest.   Signs of sleep apnea include loud snoring, noisy breathing, and gasping sounds during sleep. People with sleep apnea often find they use the bathroom many times during the night. Daytime symptoms include waking up tired after a full night's sleep and waking up with headaches. They can also include feeling very sleepy or falling asleep during the day, and having problems with memory or concentration.   Risk factors for sleep apnea include:  Being overweight  Being assigned male at birth, or being in menopause  Smoking  Using alcohol or sedating medicines  Having enlarged structures in the nose or throat such as enlarged tonsils or adenoids, or extra tissue in the airway  Home care  Lifestyle changes that can help treat snoring and sleep apnea include:   If you're overweight, talk with your healthcare provider about a weight-loss plan for you.  Don't drink alcohol for 3 to 4 hours before bedtime.  Don't take sedating medicines. Ask your healthcare provider about the medicines you take.  If you smoke, talk to your provider about ways to quit. It's important to stay away from secondhand smoke. Don't use  e-cigarettes because of their harmful side effects.  Sleep on your side. This can help prevent gravity from pulling relaxed throat tissues into your breathing passages.  If you have allergies or sinus problems that block your nose, ask your provider for help.  Use positive airway pressure (PAP). Discuss with your provider the benefits of using PAP at home. And talk about the type of PAP that's best for you.  Follow-up care  Follow up with your healthcare provider, or as advised. A diagnosis of sleep apnea is made with a sleep study. Your provider can tell you more about this test.   When to get medical care  See your healthcare provider if you have daytime symptoms of sleep apnea. These include:   Waking up tired after a full night's sleep  Waking up with a headache  Feeling very sleepy or falling asleep during the day  Having problems with memory or concentration  Also talk with your provider if your partner tells you that you snore, gasp for air, or stop breathing while you sleep.   Seeing your provider is important because sleep apnea can make you more likely to have certain health problems. These include high blood pressure, heart attack, stroke, and sexual dysfunction. If you have sleep apnea, talk with your healthcare provider about the best treatments for you.   [employer] last reviewed this educational content on 5/1/2022  © 7331-0296 [employer]. All rights reserved. This information is not intended as a substitute for professional medical care. Always follow your healthcare professional's instructions.        Continuous Positive Air Pressure (CPAP)     A mask over the nose gently directs air into the throat to keep the airway open.     Continuous positive air pressure (CPAP) uses gentle air pressure to hold the airway open. CPAP is often the most effective treatment for sleep apnea. It works very well as a treatment for adults diagnosed with obstructive sleep apnea with a lot of sleepiness. But  keep in mind that it can take several adjustments before the setup is right for you.   How CPAP works  The CPAP machine  is a small portable pump that sits beside the bed. The pump sends air through a hose, which is held over your nose alone, or nose and mouth by a mask. Mild air pressure is gently pushed through your airway. The air pressure nudges sagging tissues aside. This widens the airway so you can breathe better. CPAP may be combined with other kinds of therapy for sleep apnea.   Types of air pressure treatments  There are different types of CPAP. Your doctor or CPAP technician will help you decide which type is best for you:   Basic CPAP keeps the pressure constant all night long.  A bilevel device (BiPAP) provides more pressure when you breathe in and less when you breathe out. A BiPAP machine also may be set to provide automatic breaths to maintain breathing if you stop breathing while sleeping.  An autoCPAP device automatically adjusts pressure throughout the night and in response to changes such as body position, sleep stage, and snoring.  [employer] last reviewed this educational content on 7/1/2019  © 7081-6571 [employer]. All rights reserved. This information is not intended as a substitute for professional medical care. Always follow your healthcare professional's instructions.

## 2024-07-09 NOTE — PROGRESS NOTES
Pulmonary/Critical Care/Sleep Medicine   Progress Note     PCP: Hope Yao MD   Phone: [phone]   Fax: [phone]          Chief Complaint   Patient presents with    Obstructive Sleep Apnea (KEISHA)     Here to discuss Cpap. Struggling a times and wants to discuss       HPI  I had the pleasure of seeing Soheila Morley who is a pleasant 53 year old female who presents for follow up  of KEISHA after 1/24/2024     The patient reports using auto CPAP daily at night at 10-20 cmH2O regularly throughout the night for about 6 hours and states that the  PAP machine is quiet and has a heated humidifier.  The patient denies  daytime hypersomnolence or fatigue.     She is using nasal piillow    The patient denies kicking legs at night.         She drinks  2  caffeine cans of soda off and on       She has pets  1 cat that does sleep in bed.         Hx of tobacco use: She  reports that she has never smoked. She has been exposed to tobacco smoke. She has never used smokeless tobacco.    Past Medical History:    ALCOHOL USE    very rare    Anxiety    Depression    Essential hypertension    Hyperlipidemia    Hypothyroidism    Obesity    Pneumonia due to organism    once as a baby/ once as adult      Past Surgical History:   Procedure Laterality Date    Carpal tunnel release Bilateral     Colonoscopy      X2    Colonoscopy      D & c      Dilation/curettage,diagnostic      Hernia surgery  09/22/2017    Double hernia, umbilical and ventral    Hysterectomy  10/2013    Ovaries Remain    Other surgical history  02/13/2020    Anal Exam Under Anesthesia Transanal Excision of Anal Canal Polyp    Tonsillectomy       Allergies   Allergen Reactions    Adhesive Tape RASH     Current Outpatient Medications   Medication Sig Dispense Refill    losartan 100 MG Oral Tab TAKE 1 TABLET BY MOUTH AT NIGHT 90 tablet 3    folic acid 1 MG Oral Tab Take 1 tablet (1 mg total) by mouth daily. 90 tablet 0    amLODIPine 10 MG Oral Tab Take 1 tablet  (10 mg total) by mouth nightly. 90 tablet 1    escitalopram 10 MG Oral Tab Take 1 tablet (10 mg total) by mouth daily. 90 tablet 1    ALPRAZolam 0.25 MG Oral Tab Take 1 tablet (0.25 mg total) by mouth 2 (two) times daily as needed for Anxiety. 20 tablet 1    Phentermine HCl 37.5 MG Oral Tab Take 1 tablet (37.5 mg total) by mouth before breakfast. 30 tablet 1    levothyroxine 100 MCG Oral Tab TAKE 1 TABLET BY MOUTH EVERY DAY 90 tablet 3    metFORMIN 500 MG Oral Tab TAKE 1 TABLET BY MOUTH EVERY DAY WITH BREAKFAST 90 tablet 3    Cholecalciferol (VITAMIN D) 50 MCG (2000 UT) Oral Cap Take by mouth.      omega-3 fatty acids 1000 MG Oral Cap Take 2,000 mg by mouth daily. (Patient not taking: Reported on 7/9/2024)        Social History     Socioeconomic History    Marital status:    Occupational History    Occupation:      Employer: [employer]     Comment: [employer]    Occupation: [employer]   Tobacco Use    Smoking status: Never     Passive exposure: Past (Father smoked in home when pt was a child)    Smokeless tobacco: Never   Vaping Use    Vaping status: Never Used   Substance and Sexual Activity    Alcohol use: Yes     Comment: very rare    Drug use: No   Other Topics Concern    Caffeine Concern Yes    Exercise Yes    Seat Belt Yes    Special Diet No    Stress Concern Yes    Weight Concern Yes     Social Determinants of Health     Financial Resource Strain: Low Risk  (12/11/2023)    Financial Resource Strain     Difficulty of Paying Living Expenses: Not hard at all     Med Affordability: No   Food Insecurity: No Food Insecurity (12/11/2023)    Food Insecurity     Food Insecurity: Never true   Transportation Needs: No Transportation Needs (12/11/2023)    Transportation Needs     Lack of Transportation: No   Physical Activity: Inactive (12/11/2023)    Exercise Vital Sign     Days of Exercise per Week: 0 days     Minutes of Exercise per Session: 0 min   Stress: Stress  Concern Present (12/11/2023)    Stress     Feeling of Stress : Yes   Social Connections: Somewhat Isolated (12/11/2023)    Social Connections     Frequency of Socialization with Friends and Family: 1   Housing Stability: Low Risk  (12/11/2023)    Housing Stability     Housing Instability: No      Immunization History   Administered Date(s) Administered    Covid-19 Vaccine Pfizer 30 mcg/0.3 ml 03/16/2021, 04/06/2021, 10/22/2021    TDAP 02/22/2018      Family History   Problem Relation Age of Onset    Heart Disorder Father     Stroke Father     Other (COPD) Father     Diabetes Mother         Diet Controlled    Hypertension Mother     Thyroid Disorder Mother     Cancer Mother         Lung/Skin/Thyroid    Lung Disorder Mother         [place] on lung    Heart Disorder Mother         Dec 27, 2017    Cancer Maternal Grandmother         Liver    Heart Disorder Maternal Grandfather     Stroke Maternal Grandfather     Diabetes Paternal Grandmother     Heart Attack Paternal Grandmother     Heart Disorder Paternal Grandmother     Stroke Paternal Grandmother     Stroke Paternal Grandfather     Other (Brain Tumor) Paternal Grandfather     Infectious Disease Sister     Other (Prothrombin Gene Mutation) Sister     Other (Blood Clot) Sister     Obesity Sister     Pulmonary Disease Sister         Prothrombin Gene Mutation    Other (Prothrombin Gene Mutation) Sister     Pulmonary Disease Sister         Prothrombin Gene Mutation        Review of Systems   Constitutional:  Positive for fatigue. Negative for fever and unexpected weight change.   HENT:  Negative for congestion, mouth sores, nosebleeds, postnasal drip, rhinorrhea, sore throat and trouble swallowing.    Eyes:  Negative for visual disturbance.   Respiratory:  Negative for apnea, cough, choking, chest tightness, shortness of breath and wheezing.    Cardiovascular:  Negative for chest pain, palpitations and leg swelling.   Gastrointestinal:  Negative for abdominal pain,  constipation, diarrhea, nausea and vomiting.   Genitourinary:  Negative for difficulty urinating.   Musculoskeletal:  Positive for back pain. Negative for arthralgias, gait problem and myalgias.   Neurological:  Negative for dizziness, weakness and headaches.   Psychiatric/Behavioral:  Positive for sleep disturbance.         Vitals:    07/09/24 1406   BP: 130/80   Pulse: 83   Resp: 16   Temp: 97.2 °F (36.2 °C)      SpO2: 98 %  Ht Readings from Last 1 Encounters:   07/09/24 5' 6\" (1.676 m)     Wt Readings from Last 1 Encounters:   07/09/24 287 lb (130.2 kg)     Body mass index is 46.32 kg/m².     Physical Exam  Constitutional:       General: She is not in acute distress.     Appearance: Normal appearance. She is obese. She is not ill-appearing or diaphoretic.   HENT:      Head: Normocephalic and atraumatic.      Nose: Nose normal. No congestion or rhinorrhea.      Comments: Narrow nares bilaterally      Mouth/Throat:      Mouth: Mucous membranes are moist.      Pharynx: Oropharynx is clear. No oropharyngeal exudate or posterior oropharyngeal erythema.      Comments: Mallampati class IV palate   Eyes:      Extraocular Movements: Extraocular movements intact.      Pupils: Pupils are equal, round, and reactive to light.   Cardiovascular:      Rate and Rhythm: Normal rate.      Pulses: Normal pulses.      Heart sounds: Normal heart sounds. No murmur heard.  Pulmonary:      Effort: Pulmonary effort is normal. No respiratory distress.      Breath sounds: Normal breath sounds. No wheezing or rhonchi.   Chest:      Chest wall: No tenderness.   Abdominal:      General: Abdomen is flat. Bowel sounds are normal.      Palpations: Abdomen is soft.   Musculoskeletal:         General: Normal range of motion.   Skin:     General: Skin is warm.   Neurological:      General: No focal deficit present.      Mental Status: She is alert and oriented to person, place, and time.   Psychiatric:         Mood and Affect: Mood normal.          Behavior: Behavior normal.         Thought Content: Thought content normal.         Judgment: Judgment normal.             Labs:  Last BMP  Lab Results   Component Value Date     (H) 07/06/2024    BUN 15 07/06/2024    CREATSERUM 0.92 07/06/2024    BUNCREA NOT APPLICABLE 03/08/2022    ANIONGAP 7 07/06/2024    GFRAA 89 03/08/2022    GFRNAA 77 03/08/2022    CA 9.1 07/06/2024     07/06/2024    K 4.4 07/06/2024     07/06/2024    CO2 26.0 07/06/2024    OSMOCALC 292 07/06/2024      Last CBC  Lab Results   Component Value Date    WBC 8.3 07/06/2024    RBC 4.52 07/06/2024    HGB 13.7 07/06/2024    HCT 42.2 07/06/2024    MCV 93.4 07/06/2024    MCH 30.3 07/06/2024    MCHC 32.5 07/06/2024    RDW 13.2 07/06/2024    .0 07/06/2024      Last CMP  Lab Results   Component Value Date     (H) 07/06/2024    BUN 15 07/06/2024    BUNCREA NOT APPLICABLE 03/08/2022    CREATSERUM 0.92 07/06/2024    ANIONGAP 7 07/06/2024    GFR 87 09/01/2017    GFRNAA 77 03/08/2022    GFRAA 89 03/08/2022    CA 9.1 07/06/2024    OSMOCALC 292 07/06/2024    ALKPHO 79 07/06/2024    AST 33 07/06/2024    ALT 48 07/06/2024    BILT 0.4 07/06/2024    TP 7.4 07/06/2024    ALB 3.7 07/06/2024    GLOBULIN 3.7 07/06/2024    AGRATIO 1.3 03/08/2022     07/06/2024    K 4.4 07/06/2024     07/06/2024    CO2 26.0 07/06/2024      Last Thyroid Function  Lab Results   Component Value Date    T4F 1.0 02/20/2019    TSH 0.671 07/06/2024    TSHT4 0.89 06/17/2021        Imaging:  None       Study Type: Diagnostic   Procedure: The Polysomnogram was performed with a sleep technologist in attendance at the [facility]. The following parameters were monitored: central, occipital and temporal EEG, EOG, submentalis EMG, nasal flow, nasal pressure, respiratory inductance plethysmography and oxygen saturation via continuous pulse oximetry. Sleep stages, periodic limb movements and EEG arousals were scored in accordance  with the American Academy of Sleep Medicine Manual for the Scoring of Sleep and Associated Events. Apnea Hypopnea Index was calculated using the recommended definition of hypopnea for scoring respiratory events. Data acquisition, collection and scoring have been validated and clinically correlated.   Sleep History: BENITA ANDINO is a 53 year old Female referred by QUINCY KRAFT for the evaluation of suspected obstructive sleep apnea.   Past Medical History is pertinent for Hypersomnia, Hypertension, Diabetes Mellitus, Anxiety, Depresson, Obesity.   At the time of the study, the patient was prescribed the following medications: Phentemine HCI, Folic Acid, Levothyroxine, Escitalopram, Amlodipine, Metformin, Losartan, Alprazolam, Vitamin D, Omega-3.   The patient was studied from 09:26:26 PM to 04:52:22 AM, with a total recording time of 445.9, total sleep time of 371.0 and a sleep efficiency of 83.2%. Wake after sleep onset was 60.5 minutes. The percentage of total sleep time by sleep stage is as follows: Stage 1 7.3%, Stage 2 78.0%, Stage 3 6.1% and Stage REM 8.6%.   Respiratory Analysis: The Apnea-Hypopnea Index (AHI) was 20.5 events per hour. REM related AHI was 33.8 events per hour. Supine related AHI was 22.2. Lateral related AHI was 9.8. The Central Apnea Index was 0. The oxygen saturation mary was 81.0% and patient spent 0.9% with saturations less than 88%.   Snoring Profile: Snoring was moderate.   Cardiac Profile: Electrocardiogram demonstrated normal sinus rhythm.   EEG Profile: Based on the limited recording montage, there were no significant EEG abnormalities noted. There were 105 arousals, with a total arousal index of 17.0.   Periodic Limb Movements: PLM index of 0. PLM arousal index of 0.   IMPRESSIONS: This study demonstrates moderate obstructive sleep apnea with desaturations.. The KEISHA becomes severe in REM.   Diagnosis: Obstructive Sleep Apnea G47.33   RECOMMENDATIONS:   1. It is recommended to proceed  with CPAP titration.   2. The patient should avoid alcohol and sedative medications, as these may worsen severity of symptoms.   3. The patient should avoid drowsy driving.   4. Patient to follow up with the referring physician.     Thank you for allowing me to participate in this patient's care. Please do not hesitate to contact me with any additional questions.   Dictated by: Dr. Leger   Electronically signed by Uzair Leger MD   (Electronic Signature)   Board Certified in Sleep Medicine            Study 4/13/2024 Type: CPAP Titration   Procedure: The CPAP Titration was performed with a sleep technologist in attendance at the [facility]. The following parameters were monitored: central, occipital and temporal EEG, EOG, submentalis EMG, nasal flow, nasal pressure, respiratory inductance plethysmography, snore microphone, oxygen saturation via continuous pulse oximetry, with an additional channel for measurement of CPAP flow for the titration of positive airway pressure. Sleep stages, periodic limb movements and EEG arousals were scored in accordance with the American Academy of Sleep Medicine Manual for the Scoring of Sleep and Associated Events. Apnea Hypopnea Index was calculated using the recommended definition of hypopnea for scoring respiratory events. Data acquisition, collection and scoring have been validated and clinically correlated.   Sleep History: BENITA ANDINO is a 53 year old Female referred by KASEY CHOI for a CPAP titration study. A previous sleep study was performed on 1/24/2024, revealed obstructive sleep apnea with an overall AHI of 20.5, supine AHI of 22.8, non-supine AHI of 9.8 and REM AHI of 33.8.   Past Medical History is pertinent for snoring, hypersomnia anxiety, depression, obesity, hypertension.   At the time of the study, the patient was prescribed the following medications: LOSARTAN, phentermine, folic acid, amlodipine, levothyroxine, escitalopram, metformin,  alprazolam.   The patient was studied from 10:49:58 PM to 05:06:31 AM, with a total recording time of 376.6, total sleep time of 229.5 and sleep efficiency of 60.9%. Wake after sleep onset was 107.5 minutes. The percentage of total sleep time by sleep stage is as follows: Stage 1 4.8%, Stage 2 88.5%, Stage 3 0.0% and Stage REM 6.8%.   Respiratory Analysis: Monitoring revealed incomplete resolution of respiratory events with CPAP at 15 cm H2O. Supine REM was not observed at the final pressure. The Apnea/Hypopnea Index (AHI) was 27.1 at the final setting. The central sleep apnea index was 0. The oxygen mary was 82.8% and the patient spent 0.9% of sleep time spent with oxygen levels below 88%.   Snoring Profile: Snoring was resolved at the final pressure setting.   Cardiac Profile: Electrocardiogram demonstrated normal sinus rhythm.   EEG Profile: There was no evidence of epiliptiform activity during sleep. There were 16 spontaneous arousals, with a total spontaneous arousal index of 28.9.   Periodic Limb Movements: PLM index of 10.7. PLM Arousal Index of 2.9.   IMPRESSIONS: This study demonstrates intolerance to CPAP.   Diagnosis: Obstructive Sleep Apnea G47.33   RECOMMENDATIONS:   1. The patient should be prescribed Auto PAP at 10-20 cm H2O, with humidity at 5 and EPR on.   2. The patient was fitted with a [employer] Vitera mask, size Large.   3. The patient should avoid alcohol and sedative medications, as these may worsen severity of symptoms.   4. The patient should avoid drowsy driving.   5. Patient may follow up with a sleep specialist in clinic.     Thank you for allowing me to participate in this patient's care. Please do not hesitate to contact me with any additional questions.   Dictated by: Dr. Casas   Electronically signed by Nikko Casas MD   (Electronic Signature)   Board Certified in Sleep Medicine            [place] Sleepiness Scale: (ESS) score on today's visit is 6 out of 24.     Score total  of 1-6    Normal sleep   Score total of 7-8    Average sleepiness   Score total of 9-24    Abnormal (possibly pathologic) sleepiness       Impression:    Obstructive sleep apnea syndrome (OSAS): Attended sleep study performed on 12/13/2023 showed  Apnea-Hypopnea Index (AHI) was 20.5 events per hour. REM related AHI was 33.8 events per hour. Supine related AHI was 22.2. Lateral related AHI was 9.8. The Central Apnea Index was 0. The oxygen saturation mary was 81.0% and patient spent 0.9% with saturations less than 88%. Treated with auto CPAP at 10-20 cwp. Download data on 7/1/2024  for 31  days shows adequate AHI  BUT marginal  compliance using nasal pillows   Daytime hypersomnolence/fatigue, improving  Obesity: Class IV ;  Body mass index is 46.32 kg/m².  Anxiety   Depression  Hyperlipidemia   Hypothyroidism                               Plan:  Continue current auto CPAP 10-20 cwp as patient is using and benefiting from CPAP use  Advised patient to achieve 7-8 hours of sleep   Advised about weight loss   Advised against drowsy driving and to avoid alcoholic beverage and respiratory depressants as these may worsen sleep apnea      Follow up:  4  months     Thank you for allowing me to participate in your patient care.    JASVIR Casas MD, FACP, FCCP, FAASM - Pulmonary/Critical care/Sleep Medicine  Please contact our office if you have any questions or concerns at [phone]

## 2024-07-15 ENCOUNTER — LAB ENCOUNTER (OUTPATIENT)
Dept: LAB | Age: 54
End: 2024-07-15
Attending: INTERNAL MEDICINE
Payer: COMMERCIAL

## 2024-07-15 ENCOUNTER — OFFICE VISIT (OUTPATIENT)
Dept: INTERNAL MEDICINE CLINIC | Facility: CLINIC | Age: 54
End: 2024-07-15
Payer: COMMERCIAL

## 2024-07-15 VITALS
SYSTOLIC BLOOD PRESSURE: 138 MMHG | OXYGEN SATURATION: 97 % | HEIGHT: 65.55 IN | TEMPERATURE: 98 F | WEIGHT: 285 LBS | DIASTOLIC BLOOD PRESSURE: 86 MMHG | HEART RATE: 94 BPM | BODY MASS INDEX: 46.91 KG/M2

## 2024-07-15 DIAGNOSIS — E03.9 ACQUIRED HYPOTHYROIDISM: ICD-10-CM

## 2024-07-15 DIAGNOSIS — F41.9 ANXIETY: ICD-10-CM

## 2024-07-15 DIAGNOSIS — Z00.00 ENCOUNTER FOR ROUTINE ADULT MEDICAL EXAMINATION: Primary | ICD-10-CM

## 2024-07-15 DIAGNOSIS — Z12.31 SCREENING MAMMOGRAM FOR BREAST CANCER: ICD-10-CM

## 2024-07-15 DIAGNOSIS — E11.9 DIET-CONTROLLED DIABETES MELLITUS (HCC): ICD-10-CM

## 2024-07-15 DIAGNOSIS — I10 ESSENTIAL HYPERTENSION: ICD-10-CM

## 2024-07-15 DIAGNOSIS — E53.8 LOW FOLIC ACID: ICD-10-CM

## 2024-07-15 LAB — FOLATE SERPL-MCNC: >24 NG/ML (ref 5.4–?)

## 2024-07-15 PROCEDURE — 99396 PREV VISIT EST AGE 40-64: CPT | Performed by: INTERNAL MEDICINE

## 2024-07-15 PROCEDURE — 36415 COLL VENOUS BLD VENIPUNCTURE: CPT

## 2024-07-15 PROCEDURE — 82746 ASSAY OF FOLIC ACID SERUM: CPT

## 2024-07-15 NOTE — PROGRESS NOTES
[facility] Internal Medicine Office Note  Chief Complaint:   Chief Complaint   Patient presents with    CPX     Patient is not fasting        HPI:   This is a 53 year old female coming in for physical  HPI    HTN - BP at goal on losartan 100mg and amlodipine     Anxiety - doing ok on escitalopram; \"some days are better than others\"   She feels like she is getting worse at handling stress    She notes hot flashes     Hypothyroidism - TSH at goal     KEISHA - recently she started with CPAP   Seeing Dr. Casas     Her insurance only covers colonoscopy every 5 years despite the recommendation of 3 year follow up         Patient Active Problem List   Diagnosis    Essential hypertension    Anxiety and depression    Acquired hypothyroidism    History of hysterectomy, supracervical    Umbilical hernia    Ventral hernia    Ventral hernia without obstruction or gangrene    Class 3 severe obesity due to excess calories with serious comorbidity and body mass index (BMI) of 45.0 to 49.9 in adult (HCC)    Hypertriglyceridemia    Elevated fasting glucose    External hemorrhoid    Anal polyp    KEISHA (obstructive sleep apnea)    Daytime hypersomnolence    Diet-controlled diabetes mellitus (HCC)    Therapeutic drug monitoring     Past Surgical History:   Procedure Laterality Date    Carpal tunnel release Bilateral     Colonoscopy      X2    Colonoscopy      D & c      Dilation/curettage,diagnostic      Hernia surgery  09/22/2017    Double hernia, umbilical and ventral    Hysterectomy  10/2013    Ovaries Remain    Other surgical history  02/13/2020    Anal Exam Under Anesthesia Transanal Excision of Anal Canal Polyp    Tonsillectomy       Family History   Problem Relation Age of Onset    Heart Disorder Father     Stroke Father     Other (COPD) Father     Diabetes Mother         Diet Controlled    Hypertension Mother     Thyroid Disorder Mother     Cancer Mother         Lung/Skin/Thyroid    Lung Disorder Mother         Turmor on  lung    Heart Disorder Mother         Dec 27, 2017    Cancer Maternal Grandmother         Liver    Heart Disorder Maternal Grandfather     Stroke Maternal Grandfather     Diabetes Paternal Grandmother     Heart Attack Paternal Grandmother     Heart Disorder Paternal Grandmother     Stroke Paternal Grandmother     Stroke Paternal Grandfather     Other (Brain Tumor) Paternal Grandfather     Infectious Disease Sister     Other (Prothrombin Gene Mutation) Sister     Other (Blood Clot) Sister     Obesity Sister     Pulmonary Disease Sister         Prothrombin Gene Mutation    Other (Prothrombin Gene Mutation) Sister     Pulmonary Disease Sister         Prothrombin Gene Mutation        I reviewed her's Past Medical History, Past Surgical History, Family History and   Social History updated shows  Social History     Socioeconomic History    Marital status:    Occupational History    Occupation:      Employer: [employer]     Comment: [employer]    Occupation: [employer]   Tobacco Use    Smoking status: Never     Passive exposure: Past (Father smoked in home when pt was a child)    Smokeless tobacco: Never   Vaping Use    Vaping status: Never Used   Substance and Sexual Activity    Alcohol use: Yes     Comment: very rare    Drug use: No   Other Topics Concern    Caffeine Concern Yes    Exercise Yes    Seat Belt Yes    Special Diet No    Stress Concern Yes    Weight Concern Yes     Social Determinants of Health     Financial Resource Strain: Low Risk  (12/11/2023)    Financial Resource Strain     Difficulty of Paying Living Expenses: Not hard at all     Med Affordability: No   Food Insecurity: No Food Insecurity (12/11/2023)    Food Insecurity     Food Insecurity: Never true   Transportation Needs: No Transportation Needs (12/11/2023)    Transportation Needs     Lack of Transportation: No   Physical Activity: Inactive (12/11/2023)    Exercise Vital Sign     Days of Exercise  per Week: 0 days     Minutes of Exercise per Session: 0 min   Stress: Stress Concern Present (12/11/2023)    Stress     Feeling of Stress : Yes   Social Connections: Somewhat Isolated (12/11/2023)    Social Connections     Frequency of Socialization with Friends and Family: 1   Housing Stability: Low Risk  (12/11/2023)    Housing Stability     Housing Instability: No     Allergies:  Allergies   Allergen Reactions    Adhesive Tape RASH     Current Outpatient Medications   Medication Sig Dispense Refill    losartan 100 MG Oral Tab TAKE 1 TABLET BY MOUTH AT NIGHT 90 tablet 3    folic acid 1 MG Oral Tab Take 1 tablet (1 mg total) by mouth daily. 90 tablet 0    amLODIPine 10 MG Oral Tab Take 1 tablet (10 mg total) by mouth nightly. 90 tablet 1    escitalopram 10 MG Oral Tab Take 1 tablet (10 mg total) by mouth daily. 90 tablet 1    ALPRAZolam 0.25 MG Oral Tab Take 1 tablet (0.25 mg total) by mouth 2 (two) times daily as needed for Anxiety. 20 tablet 1    Phentermine HCl 37.5 MG Oral Tab Take 1 tablet (37.5 mg total) by mouth before breakfast. 30 tablet 1    levothyroxine 100 MCG Oral Tab TAKE 1 TABLET BY MOUTH EVERY DAY 90 tablet 3    metFORMIN 500 MG Oral Tab TAKE 1 TABLET BY MOUTH EVERY DAY WITH BREAKFAST 90 tablet 3    Cholecalciferol (VITAMIN D) 50 MCG (2000 UT) Oral Cap Take by mouth.      omega-3 fatty acids 1000 MG Oral Cap Take 2,000 mg by mouth daily. (Patient not taking: Reported on 7/9/2024)           REVIEW OF SYSTEMS:   Review of Systems   Constitutional:  Negative for fever.   HENT:  Negative for congestion.    Eyes:  Negative for visual disturbance.   Respiratory:  Negative for shortness of breath.    Cardiovascular:  Negative for chest pain.   Gastrointestinal:  Negative for constipation.   Genitourinary:  Negative for dysuria.   Neurological: Negative.    Hematological: Negative.    Psychiatric/Behavioral: Negative.          EXAM:   /86 (BP Location: Left arm, Patient Position: Sitting, Cuff Size:  large)   Pulse 94   Temp 98 °F (36.7 °C) (Temporal)   Ht 5' 5.55\" (1.665 m)   Wt 285 lb (129.3 kg)   SpO2 97%   BMI 46.63 kg/m²  Estimated body mass index is 46.63 kg/m² as calculated from the following:    Height as of this encounter: 5' 5.55\" (1.665 m).    Weight as of this encounter: 285 lb (129.3 kg).   Vital signs reviewed. Appears stated age, well groomed.  Physical Exam  Constitutional:       General: She is not in acute distress.     Appearance: She is well-developed.   HENT:      Head: Normocephalic and atraumatic.   Eyes:      Conjunctiva/sclera: Conjunctivae normal.   Cardiovascular:      Rate and Rhythm: Normal rate and regular rhythm.      Heart sounds: Normal heart sounds.   Pulmonary:      Effort: Pulmonary effort is normal.      Breath sounds: Normal breath sounds.   Musculoskeletal:      Cervical back: Neck supple.   Skin:     General: Skin is warm and dry.   Neurological:      General: No focal deficit present.      Mental Status: She is alert.   Psychiatric:         Mood and Affect: Mood normal.          ASSESSMENT AND PLAN:   Soheila Morley is a 53 year old female with  1. Encounter for routine adult medical examination    2. Essential hypertension    3. Anxiety    4. Acquired hypothyroidism    5. Screening mammogram for breast cancer    6. Diet-controlled diabetes mellitus (HCC)    7. Low folic acid          The plan is as follows  Soheila was seen today for cpx.    Diagnoses and all orders for this visit:    Encounter for routine adult medical examination  -due for shingrix  -mammo due 9/2024  -colonoscopy 3 yr follow up due but insurance will not cover until 5 yr - she plans to get in 2025 due to insurance  -flu and covid vaccine     Essential hypertension -at goal; cont present     Anxiety -cont escitalopram. She takes alprazolam sparingly     Acquired hypothyroidism -TSH at goal; cont levothyroxine     Screening mammogram for breast cancer  -     [facility] ROB 2D+3D SCREENING BILAT  (CPT=77067/10273); Future    Diet-controlled diabetes mellitus (HCC) -A1c 6.0. cont meformin     Low folic acid - she takes folic acid; recheck   -     Folic Acid Serum [E]; Future        Orders Placed This Encounter   Procedures    Folic Acid Serum [E]       Meds & Refills for this Visit:  Requested Prescriptions      No prescriptions requested or ordered in this encounter       Imaging & Consults:  [facility] ROB 2D+3D SCREENING BILAT (CPT=77067/06632)    Health Maintenance Due   Topic Date Due    Pneumococcal Vaccine: Birth to 64yrs (1 of 2 - PCV) Never done    Zoster Vaccines (1 of 2) Never done    Colorectal Cancer Screening  06/02/2023    COVID-19 Vaccine (4 - 2023-24 season) 09/01/2023    Annual Physical  07/12/2024    Mammogram  09/07/2024     Patient/Caregiver Education: Patient/Caregiver Education: There are no barriers to learning. Medical education done. Outcome: Patient verbalizes understanding. Patient is notified to call with any questions, complications, allergies, or worsening or changing symptoms.  Patient is to call with any side effects or complications from the treatments as a result of today.     Hope Yao MD

## 2024-07-19 ENCOUNTER — TELEPHONE (OUTPATIENT)
Facility: CLINIC | Age: 54
End: 2024-07-19

## 2024-07-19 NOTE — TELEPHONE ENCOUNTER
Received a fax from [facility] requesting chart notes.  Faxed office visit notes from 7-9-24 to [facility] at [phone]

## 2024-07-25 DIAGNOSIS — E11.9 DIET-CONTROLLED DIABETES MELLITUS (HCC): ICD-10-CM

## 2024-07-28 NOTE — TELEPHONE ENCOUNTER
Requesting   Requested Prescriptions     Pending Prescriptions Disp Refills    Phentermine HCl 37.5 MG Oral Tab 30 tablet 1     Sig: Take 1 tablet (37.5 mg total) by mouth before breakfast.       LOV: 5/15/24  RTC:   Last Relevant Labs:   Filled: 1/17/24 #30  with 1 refills    Future Appointments   Date Time Provider Department Center   8/13/2024 12:15 PM Nikko Casas MD [facility]   9/19/2024  8:20 AM Rhea Morris MD [facility]   12/12/2024  8:20 AM Rhea Morris MD [facility]

## 2024-07-29 RX ORDER — PHENTERMINE HYDROCHLORIDE 37.5 MG/1
37.5 TABLET ORAL
Qty: 30 TABLET | Refills: 1 | Status: SHIPPED | OUTPATIENT
Start: 2024-07-29

## 2024-08-05 NOTE — TELEPHONE ENCOUNTER
Protocol passed     Metformin 500mg     LOV: 7/15/24   RTC: 6 months   Filled: 8/15/23 #90 3 refill   Labs: 7/6/24   Future Appointments   Date Time Provider Department Center   8/13/2024 12:15 PM Nikko Casas MD [facility]   9/19/2024  8:20 AM Rhea Morris MD [facility]   12/12/2024  8:20 AM Rhea Morris MD [facility]

## 2024-08-12 NOTE — PROGRESS NOTES
This is a 53 year old female who presents with the following symptoms, risk factors, behaviors or other items associated with sleep problems.    Sleep Apnea:   coughing; wakes with dry mouth; non-refreshing sleep; overweight; high blood pressure; current CPAP use  Insomnia:  non-refreshing sleep; mind racing; job stress  Restless Leg:  moving leges helps relieve discomfort  Parasomnias:   no symptoms of parasomnias  Daytime Problems:  no symptoms of daytime problems    The patient's Arnegard Sleepiness score is 6/24.    BENITA ANDINO  2024 - 2024  Patient ID: [id number]  : 1970  Age: 53 years  Gender: Female  [place] ([id number])  [place]  Compliance Report  Usage 2024 - 2024  Usage days 55/58 days (95%)  >= 4 hours 52 days (90%)  < 4 hours 3 days (5%)  Usage hours 307 hours 14 minutes  Average usage (total days) 5 hours 18 minutes  Average usage (days used) 5 hours 35 minutes  Median usage (days used) 5 hours 36 minutes  Total used hours (value since last reset - 2024) 307 hours  AirSense 11 AutoSet  Serial number [id number]  Mode AutoSet  Min Pressure 10 cmH2O  Max Pressure 20 cmH2O  EPR Ramp Only  EPR level 1  Response Standard  Therapy  Pressure - cmH2O Median: 11.6 95th percentile: 14.1 Maximum: 15.4  Leaks - L/min Median: 0.0 95th percentile: 11.1 Maximum: 23.8  Events per hour AI: 1.2 HI: 0.6 AHI: 1.8  Apnea Index Central: 0.3 Obstructive: 0.9 Unknown: 0.0  RERA Index 0.1  Cheyne-Gonsalez respiration (average duration per night) 0 minutes (0%)

## 2024-08-13 ENCOUNTER — OFFICE VISIT (OUTPATIENT)
Facility: CLINIC | Age: 54
End: 2024-08-13
Payer: COMMERCIAL

## 2024-08-13 VITALS
WEIGHT: 285 LBS | HEART RATE: 78 BPM | OXYGEN SATURATION: 100 % | BODY MASS INDEX: 46.91 KG/M2 | SYSTOLIC BLOOD PRESSURE: 130 MMHG | RESPIRATION RATE: 18 BRPM | DIASTOLIC BLOOD PRESSURE: 76 MMHG | TEMPERATURE: 97 F | HEIGHT: 65.55 IN

## 2024-08-13 DIAGNOSIS — I10 ESSENTIAL HYPERTENSION: Primary | ICD-10-CM

## 2024-08-13 DIAGNOSIS — G47.33 OSA (OBSTRUCTIVE SLEEP APNEA): ICD-10-CM

## 2024-08-13 DIAGNOSIS — E66.01 CLASS 3 SEVERE OBESITY DUE TO EXCESS CALORIES WITH SERIOUS COMORBIDITY AND BODY MASS INDEX (BMI) OF 45.0 TO 49.9 IN ADULT (HCC): ICD-10-CM

## 2024-08-13 PROCEDURE — 99214 OFFICE O/P EST MOD 30 MIN: CPT | Performed by: INTERNAL MEDICINE

## 2024-08-13 NOTE — PROGRESS NOTES
Pulmonary/Critical Care/Sleep Medicine   Progress Note     PCP: Hope Yao MD   Phone: [phone]   Fax: [phone]          Chief Complaint   Patient presents with    Obstructive Sleep Apnea (KEISHA)     31 - 90  Dme:  Mask:       HPI  I had the pleasure of seeing Soheila Morley who is a pleasant 53 year old female who presents for follow up  of KEISHA after 7/9/2024     The patient reports that she is using auto CPAP daily at night at 10-20 cmH2O regularly throughout the night for about 6 hours and states that the  PAP machine is quiet and has a heated humidifier.  The patient denies  daytime hypersomnolence or fatigue.     She is using nasal piillow    The patient denies kicking legs at night.         She drinks  2  caffeine cans of soda off and on       She has pets  1 cat that does sleep in bed.         Hx of tobacco use: She  reports that she has never smoked. She has been exposed to tobacco smoke. She has never used smokeless tobacco.    Past Medical History:    ALCOHOL USE    very rare    Anxiety    Depression    Essential hypertension    Hyperlipidemia    Hypothyroidism    Obesity    Pneumonia due to organism    once as a baby/ once as adult      Past Surgical History:   Procedure Laterality Date    Carpal tunnel release Bilateral     Colonoscopy      X2    Colonoscopy      D & c      Dilation/curettage,diagnostic      Hernia surgery  09/22/2017    Double hernia, umbilical and ventral    Hysterectomy  10/2013    Ovaries Remain    Other surgical history  02/13/2020    Anal Exam Under Anesthesia Transanal Excision of Anal Canal Polyp    Tonsillectomy       Allergies   Allergen Reactions    Adhesive Tape RASH     Current Outpatient Medications   Medication Sig Dispense Refill    metFORMIN 500 MG Oral Tab TAKE 1 TABLET BY MOUTH EVERY DAY WITH BREAKFAST 90 tablet 3    Phentermine HCl 37.5 MG Oral Tab Take 1 tablet (37.5 mg total) by mouth before breakfast. 30 tablet 1    losartan 100 MG Oral Tab TAKE 1  TABLET BY MOUTH AT NIGHT 90 tablet 3    folic acid 1 MG Oral Tab Take 1 tablet (1 mg total) by mouth daily. 90 tablet 0    amLODIPine 10 MG Oral Tab Take 1 tablet (10 mg total) by mouth nightly. 90 tablet 1    escitalopram 10 MG Oral Tab Take 1 tablet (10 mg total) by mouth daily. 90 tablet 1    ALPRAZolam 0.25 MG Oral Tab Take 1 tablet (0.25 mg total) by mouth 2 (two) times daily as needed for Anxiety. 20 tablet 1    levothyroxine 100 MCG Oral Tab TAKE 1 TABLET BY MOUTH EVERY DAY 90 tablet 3    Cholecalciferol (VITAMIN D) 50 MCG (2000 UT) Oral Cap Take by mouth.      omega-3 fatty acids 1000 MG Oral Cap Take 2,000 mg by mouth daily.        Social History     Socioeconomic History    Marital status:    Occupational History    Occupation:      Employer: [employer]     Comment: [employer]    Occupation: [employer]   Tobacco Use    Smoking status: Never     Passive exposure: Past (Father smoked in home when pt was a child)    Smokeless tobacco: Never   Vaping Use    Vaping status: Never Used   Substance and Sexual Activity    Alcohol use: Yes     Comment: very rare    Drug use: No   Other Topics Concern    Caffeine Concern Yes    Exercise Yes    Seat Belt Yes    Special Diet No    Stress Concern Yes    Weight Concern Yes     Social Determinants of Health     Financial Resource Strain: Low Risk  (12/11/2023)    Financial Resource Strain     Difficulty of Paying Living Expenses: Not hard at all     Med Affordability: No   Food Insecurity: No Food Insecurity (12/11/2023)    Food Insecurity     Food Insecurity: Never true   Transportation Needs: No Transportation Needs (12/11/2023)    Transportation Needs     Lack of Transportation: No   Physical Activity: Inactive (12/11/2023)    Exercise Vital Sign     Days of Exercise per Week: 0 days     Minutes of Exercise per Session: 0 min   Stress: Stress Concern Present (12/11/2023)    Stress     Feeling of Stress : Yes   Social  Connections: Somewhat Isolated (12/11/2023)    Social Connections     Frequency of Socialization with Friends and Family: 1   Housing Stability: Low Risk  (12/11/2023)    Housing Stability     Housing Instability: No      Immunization History   Administered Date(s) Administered    Covid-19 Vaccine Pfizer 30 mcg/0.3 ml 03/16/2021, 04/06/2021, 10/22/2021    TDAP 02/22/2018      Family History   Problem Relation Age of Onset    Heart Disorder Father     Stroke Father     Other (COPD) Father     Diabetes Mother         Diet Controlled    Hypertension Mother     Thyroid Disorder Mother     Cancer Mother         Lung/Skin/Thyroid    Lung Disorder Mother         [place] on lung    Heart Disorder Mother         Dec 27, 2017    Cancer Maternal Grandmother         Liver    Heart Disorder Maternal Grandfather     Stroke Maternal Grandfather     Diabetes Paternal Grandmother     Heart Attack Paternal Grandmother     Heart Disorder Paternal Grandmother     Stroke Paternal Grandmother     Stroke Paternal Grandfather     Other (Brain Tumor) Paternal Grandfather     Infectious Disease Sister     Other (Prothrombin Gene Mutation) Sister     Other (Blood Clot) Sister     Obesity Sister     Pulmonary Disease Sister         Prothrombin Gene Mutation    Other (Prothrombin Gene Mutation) Sister     Pulmonary Disease Sister         Prothrombin Gene Mutation        Review of Systems   Constitutional:  Positive for fatigue. Negative for fever and unexpected weight change.   HENT:  Negative for congestion, mouth sores, nosebleeds, postnasal drip, rhinorrhea, sore throat and trouble swallowing.    Eyes:  Negative for visual disturbance.   Respiratory:  Negative for apnea, cough, choking, chest tightness, shortness of breath and wheezing.    Cardiovascular:  Negative for chest pain, palpitations and leg swelling.   Gastrointestinal:  Negative for abdominal pain, constipation, diarrhea, nausea and vomiting.   Genitourinary:  Negative for  difficulty urinating.   Musculoskeletal:  Positive for back pain. Negative for arthralgias, gait problem and myalgias.   Neurological:  Negative for dizziness, weakness and headaches.   Psychiatric/Behavioral:  Negative for sleep disturbance.         Vitals:    08/13/24 1234   BP: 130/76   Pulse: 78   Resp: 18   Temp: 97.2 °F (36.2 °C)      SpO2: 100 %  Ht Readings from Last 1 Encounters:   08/13/24 5' 5.55\" (1.665 m)     Wt Readings from Last 1 Encounters:   08/13/24 285 lb (129.3 kg)     Body mass index is 46.63 kg/m².     Physical Exam  Constitutional:       General: She is not in acute distress.     Appearance: Normal appearance. She is obese. She is not ill-appearing or diaphoretic.   HENT:      Head: Normocephalic and atraumatic.      Nose: Nose normal. No congestion or rhinorrhea.      Comments: Narrow nares bilaterally      Mouth/Throat:      Mouth: Mucous membranes are moist.      Pharynx: Oropharynx is clear. No oropharyngeal exudate or posterior oropharyngeal erythema.      Comments: Mallampati class IV palate   Eyes:      Extraocular Movements: Extraocular movements intact.      Pupils: Pupils are equal, round, and reactive to light.   Cardiovascular:      Rate and Rhythm: Normal rate.      Pulses: Normal pulses.      Heart sounds: Normal heart sounds. No murmur heard.  Pulmonary:      Effort: Pulmonary effort is normal. No respiratory distress.      Breath sounds: Normal breath sounds. No wheezing or rhonchi.   Chest:      Chest wall: No tenderness.   Abdominal:      General: Abdomen is flat. Bowel sounds are normal.      Palpations: Abdomen is soft.   Musculoskeletal:         General: Normal range of motion.   Skin:     General: Skin is warm.   Neurological:      General: No focal deficit present.      Mental Status: She is alert and oriented to person, place, and time.   Psychiatric:         Mood and Affect: Mood normal.         Behavior: Behavior normal.         Thought Content: Thought content  normal.         Judgment: Judgment normal.             Labs:  Last BMP  Lab Results   Component Value Date     (H) 07/06/2024    BUN 15 07/06/2024    CREATSERUM 0.92 07/06/2024    BUNCREA NOT APPLICABLE 03/08/2022    ANIONGAP 7 07/06/2024    GFRAA 89 03/08/2022    GFRNAA 77 03/08/2022    CA 9.1 07/06/2024     07/06/2024    K 4.4 07/06/2024     07/06/2024    CO2 26.0 07/06/2024    OSMOCALC 292 07/06/2024      Last CBC  Lab Results   Component Value Date    WBC 8.3 07/06/2024    RBC 4.52 07/06/2024    HGB 13.7 07/06/2024    HCT 42.2 07/06/2024    MCV 93.4 07/06/2024    MCH 30.3 07/06/2024    MCHC 32.5 07/06/2024    RDW 13.2 07/06/2024    .0 07/06/2024      Last CMP  Lab Results   Component Value Date     (H) 07/06/2024    BUN 15 07/06/2024    BUNCREA NOT APPLICABLE 03/08/2022    CREATSERUM 0.92 07/06/2024    ANIONGAP 7 07/06/2024    GFR 87 09/01/2017    GFRNAA 77 03/08/2022    GFRAA 89 03/08/2022    CA 9.1 07/06/2024    OSMOCALC 292 07/06/2024    ALKPHO 79 07/06/2024    AST 33 07/06/2024    ALT 48 07/06/2024    BILT 0.4 07/06/2024    TP 7.4 07/06/2024    ALB 3.7 07/06/2024    GLOBULIN 3.7 07/06/2024    AGRATIO 1.3 03/08/2022     07/06/2024    K 4.4 07/06/2024     07/06/2024    CO2 26.0 07/06/2024      Last Thyroid Function  Lab Results   Component Value Date    T4F 1.0 02/20/2019    TSH 0.671 07/06/2024    TSHT4 0.89 06/17/2021        Imaging:  None       Study Type: Diagnostic   Procedure: The Polysomnogram was performed with a sleep technologist in attendance at the [facility]. The following parameters were monitored: central, occipital and temporal EEG, EOG, submentalis EMG, nasal flow, nasal pressure, respiratory inductance plethysmography and oxygen saturation via continuous pulse oximetry. Sleep stages, periodic limb movements and EEG arousals were scored in accordance with the American Academy of Sleep Medicine Manual for the Scoring of  Sleep and Associated Events. Apnea Hypopnea Index was calculated using the recommended definition of hypopnea for scoring respiratory events. Data acquisition, collection and scoring have been validated and clinically correlated.   Sleep History: BENITA ANDINO is a 53 year old Female referred by QUINCY KRAFT for the evaluation of suspected obstructive sleep apnea.   Past Medical History is pertinent for Hypersomnia, Hypertension, Diabetes Mellitus, Anxiety, Depresson, Obesity.   At the time of the study, the patient was prescribed the following medications: Phentemine HCI, Folic Acid, Levothyroxine, Escitalopram, Amlodipine, Metformin, Losartan, Alprazolam, Vitamin D, Omega-3.   The patient was studied from 09:26:26 PM to 04:52:22 AM, with a total recording time of 445.9, total sleep time of 371.0 and a sleep efficiency of 83.2%. Wake after sleep onset was 60.5 minutes. The percentage of total sleep time by sleep stage is as follows: Stage 1 7.3%, Stage 2 78.0%, Stage 3 6.1% and Stage REM 8.6%.   Respiratory Analysis: The Apnea-Hypopnea Index (AHI) was 20.5 events per hour. REM related AHI was 33.8 events per hour. Supine related AHI was 22.2. Lateral related AHI was 9.8. The Central Apnea Index was 0. The oxygen saturation mary was 81.0% and patient spent 0.9% with saturations less than 88%.   Snoring Profile: Snoring was moderate.   Cardiac Profile: Electrocardiogram demonstrated normal sinus rhythm.   EEG Profile: Based on the limited recording montage, there were no significant EEG abnormalities noted. There were 105 arousals, with a total arousal index of 17.0.   Periodic Limb Movements: PLM index of 0. PLM arousal index of 0.   IMPRESSIONS: This study demonstrates moderate obstructive sleep apnea with desaturations.. The KEISHA becomes severe in REM.   Diagnosis: Obstructive Sleep Apnea G47.33   RECOMMENDATIONS:   1. It is recommended to proceed with CPAP titration.   2. The patient should avoid alcohol and  sedative medications, as these may worsen severity of symptoms.   3. The patient should avoid drowsy driving.   4. Patient to follow up with the referring physician.     Thank you for allowing me to participate in this patient's care. Please do not hesitate to contact me with any additional questions.   Dictated by: Dr. Leger   Electronically signed by Uzair Leger MD   (Electronic Signature)   Board Certified in Sleep Medicine            Study 4/13/2024 Type: CPAP Titration   Procedure: The CPAP Titration was performed with a sleep technologist in attendance at the [facility]. The following parameters were monitored: central, occipital and temporal EEG, EOG, submentalis EMG, nasal flow, nasal pressure, respiratory inductance plethysmography, snore microphone, oxygen saturation via continuous pulse oximetry, with an additional channel for measurement of CPAP flow for the titration of positive airway pressure. Sleep stages, periodic limb movements and EEG arousals were scored in accordance with the American Academy of Sleep Medicine Manual for the Scoring of Sleep and Associated Events. Apnea Hypopnea Index was calculated using the recommended definition of hypopnea for scoring respiratory events. Data acquisition, collection and scoring have been validated and clinically correlated.   Sleep History: BENITA ANDINO is a 53 year old Female referred by KASEY CHOI for a CPAP titration study. A previous sleep study was performed on 1/24/2024, revealed obstructive sleep apnea with an overall AHI of 20.5, supine AHI of 22.8, non-supine AHI of 9.8 and REM AHI of 33.8.   Past Medical History is pertinent for snoring, hypersomnia anxiety, depression, obesity, hypertension.   At the time of the study, the patient was prescribed the following medications: LOSARTAN, phentermine, folic acid, amlodipine, levothyroxine, escitalopram, metformin, alprazolam.   The patient was studied from 10:49:58 PM to 05:06:31  AM, with a total recording time of 376.6, total sleep time of 229.5 and sleep efficiency of 60.9%. Wake after sleep onset was 107.5 minutes. The percentage of total sleep time by sleep stage is as follows: Stage 1 4.8%, Stage 2 88.5%, Stage 3 0.0% and Stage REM 6.8%.   Respiratory Analysis: Monitoring revealed incomplete resolution of respiratory events with CPAP at 15 cm H2O. Supine REM was not observed at the final pressure. The Apnea/Hypopnea Index (AHI) was 27.1 at the final setting. The central sleep apnea index was 0. The oxygen mary was 82.8% and the patient spent 0.9% of sleep time spent with oxygen levels below 88%.   Snoring Profile: Snoring was resolved at the final pressure setting.   Cardiac Profile: Electrocardiogram demonstrated normal sinus rhythm.   EEG Profile: There was no evidence of epiliptiform activity during sleep. There were 16 spontaneous arousals, with a total spontaneous arousal index of 28.9.   Periodic Limb Movements: PLM index of 10.7. PLM Arousal Index of 2.9.   IMPRESSIONS: This study demonstrates intolerance to CPAP.   Diagnosis: Obstructive Sleep Apnea G47.33   RECOMMENDATIONS:   1. The patient should be prescribed Auto PAP at 10-20 cm H2O, with humidity at 5 and EPR on.   2. The patient was fitted with a [employer] Vitera mask, size Large.   3. The patient should avoid alcohol and sedative medications, as these may worsen severity of symptoms.   4. The patient should avoid drowsy driving.   5. Patient may follow up with a sleep specialist in clinic.     Thank you for allowing me to participate in this patient's care. Please do not hesitate to contact me with any additional questions.   Dictated by: Dr. Casas   Electronically signed by Nikko Casas MD   (Electronic Signature)   Board Certified in Sleep Medicine                BNEITA ANDINO  2024 - 2024  Patient ID: [id number]  : 1970  Age: 53 years  Gender: Female  WILLOW[place]IL  ([id number])  [place]  Compliance Report  Usage 06/16/2024 - 08/12/2024  Usage days 55/58 days (95%)  >= 4 hours 52 days (90%)  < 4 hours 3 days (5%)  Usage hours 307 hours 14 minutes  Average usage (total days) 5 hours 18 minutes  Average usage (days used) 5 hours 35 minutes  Median usage (days used) 5 hours 36 minutes  Total used hours (value since last reset - 08/12/2024) 307 hours  AirSense 11 AutoSet  Serial number [id number]  Mode AutoSet  Min Pressure 10 cmH2O  Max Pressure 20 cmH2O  EPR Ramp Only  EPR level 1  Response Standard  Therapy  Pressure - cmH2O Median: 11.6 95th percentile: 14.1 Maximum: 15.4  Leaks - L/min Median: 0.0 95th percentile: 11.1 Maximum: 23.8  Events per hour AI: 1.2 HI: 0.6 AHI: 1.8  Apnea Index Central: 0.3 Obstructive: 0.9 Unknown: 0.0  RERA Index 0.1  Cheyne-Gonsalez respiration (average duration per night) 0 minutes (0%)     [place] Sleepiness Scale: (ESS) score on today's visit is 6 out of 24.     Score total of 1-6    Normal sleep   Score total of 7-8    Average sleepiness   Score total of 9-24    Abnormal (possibly pathologic) sleepiness       Impression:    Obstructive sleep apnea syndrome (OSAS): Attended sleep study performed on 12/13/2023 showed  Apnea-Hypopnea Index (AHI) was 20.5 events per hour. REM related AHI was 33.8 events per hour. Supine related AHI was 22.2. Lateral related AHI was 9.8. The Central Apnea Index was 0. The oxygen saturation mary was 81.0% and patient spent 0.9% with saturations less than 88%. Treated with auto CPAP at 10-20 cwp. Download data on 8/12024  for 58  days shows adequate AHI  and compliance  Daytime hypersomnolence/fatigue: controlled symptoms  Obesity: Class IV ;  Body mass index is 46.63 kg/m².  Anxiety   Depression  Hyperlipidemia   Hypothyroidism                               Plan:  Continue  current auto CPAP 10-20 cwp as patient is using and benefiting from CPAP use form [place]   Advised patient to achieve 7-8 hours of sleep   Advised  about weight loss   Advised against drowsy driving and to avoid alcoholic beverage and respiratory depressants as these may worsen sleep apnea      Follow up:  8 months     Thank you for allowing me to participate in your patient care.    JASVIR Casas MD, FACP, FCCP, FAASM - Pulmonary/Critical care/Sleep Medicine  Please contact our office if you have any questions or concerns at [phone]

## 2024-08-13 NOTE — PATIENT INSTRUCTIONS
Plan:  Continue  current auto CPAP 10-20 cwp as patient is using and benefiting from CPAP use  Advised patient to achieve 7-8 hours of sleep   Advised about weight loss   Advised against drowsy driving and to avoid alcoholic beverage and respiratory depressants as these may worsen sleep apnea      Follow up:  8 months       Nikko Casas MD        Obstructive Sleep Apnea  Obstructive sleep apnea is a condition caused by air passages becoming narrowed or blocked during sleep. As a result, breathing stops for short periods. Your body wakes up enough for breathing to start again. But you don't remember it. The cycle of stopped breathing and brief awakenings can repeat dozens of times a night. This prevents the body from getting to the deeper stages of sleep that are needed for good rest.   Signs of sleep apnea include loud snoring, noisy breathing, and gasping sounds during sleep. People with sleep apnea often find they use the bathroom many times during the night. Daytime symptoms include waking up tired after a full night's sleep and waking up with headaches. They can also include feeling very sleepy or falling asleep during the day, and having problems with memory or concentration.   Risk factors for sleep apnea include:  Being overweight  Being assigned male at birth, or being in menopause  Smoking  Using alcohol or sedating medicines  Having enlarged structures in the nose or throat such as enlarged tonsils or adenoids, or extra tissue in the airway  Home care  Lifestyle changes that can help treat snoring and sleep apnea include:   If you're overweight, talk with your healthcare provider about a weight-loss plan for you.  Don't drink alcohol for 3 to 4 hours before bedtime.  Don't take sedating medicines. Ask your healthcare provider about the medicines you take.  If you smoke, talk to your provider about ways to quit. It's important to stay away from secondhand smoke. Don't use e-cigarettes because of their  harmful side effects.  Sleep on your side. This can help prevent gravity from pulling relaxed throat tissues into your breathing passages.  If you have allergies or sinus problems that block your nose, ask your provider for help.  Use positive airway pressure (PAP). Discuss with your provider the benefits of using PAP at home. And talk about the type of PAP that's best for you.  Follow-up care  Follow up with your healthcare provider, or as advised. A diagnosis of sleep apnea is made with a sleep study. Your provider can tell you more about this test.   When to get medical care  See your healthcare provider if you have daytime symptoms of sleep apnea. These include:   Waking up tired after a full night's sleep  Waking up with a headache  Feeling very sleepy or falling asleep during the day  Having problems with memory or concentration  Also talk with your provider if your partner tells you that you snore, gasp for air, or stop breathing while you sleep.   Seeing your provider is important because sleep apnea can make you more likely to have certain health problems. These include high blood pressure, heart attack, stroke, and sexual dysfunction. If you have sleep apnea, talk with your healthcare provider about the best treatments for you.   Ronald last reviewed this educational content on 5/1/2022  © 0358-2275 [employer]. All rights reserved. This information is not intended as a substitute for professional medical care. Always follow your healthcare professional's instructions.        Continuous Positive Air Pressure (CPAP)     A mask over the nose gently directs air into the throat to keep the airway open.     Continuous positive air pressure (CPAP) uses gentle air pressure to hold the airway open. CPAP is often the most effective treatment for sleep apnea. It works very well as a treatment for adults diagnosed with obstructive sleep apnea with a lot of sleepiness. But keep in mind that it can take  several adjustments before the setup is right for you.   How CPAP works  The CPAP machine  is a small portable pump that sits beside the bed. The pump sends air through a hose, which is held over your nose alone, or nose and mouth by a mask. Mild air pressure is gently pushed through your airway. The air pressure nudges sagging tissues aside. This widens the airway so you can breathe better. CPAP may be combined with other kinds of therapy for sleep apnea.   Types of air pressure treatments  There are different types of CPAP. Your doctor or CPAP technician will help you decide which type is best for you:   Basic CPAP keeps the pressure constant all night long.  A bilevel device (BiPAP) provides more pressure when you breathe in and less when you breathe out. A BiPAP machine also may be set to provide automatic breaths to maintain breathing if you stop breathing while sleeping.  An autoCPAP device automatically adjusts pressure throughout the night and in response to changes such as body position, sleep stage, and snoring.  [employer] last reviewed this educational content on 7/1/2019  © 8597-5259 [employer]. All rights reserved. This information is not intended as a substitute for professional medical care. Always follow your healthcare professional's instructions.

## 2024-09-17 DIAGNOSIS — E11.9 DIET-CONTROLLED DIABETES MELLITUS (HCC): ICD-10-CM

## 2024-09-17 RX ORDER — AMLODIPINE BESYLATE 10 MG/1
10 TABLET ORAL NIGHTLY
Qty: 90 TABLET | Refills: 1 | Status: SHIPPED | OUTPATIENT
Start: 2024-09-17

## 2024-09-17 RX ORDER — ESCITALOPRAM OXALATE 10 MG/1
10 TABLET ORAL DAILY
Qty: 90 TABLET | Refills: 3 | Status: SHIPPED | OUTPATIENT
Start: 2024-09-17

## 2024-09-17 NOTE — TELEPHONE ENCOUNTER
LOV: 7/15/24   RTC: 6 months  Filled: 3/12/24 #90 1 refill   Labs: 7/6/24   Future Appointments   Date Time Provider Department Center   12/12/2024  8:20 AM Rhea Morris MD [facility] [address]   4/15/2025  9:00 AM Nikko Casas MD [facility] [place]

## 2024-09-18 RX ORDER — PHENTERMINE HYDROCHLORIDE 37.5 MG/1
37.5 TABLET ORAL
Qty: 30 TABLET | Refills: 0 | Status: SHIPPED | OUTPATIENT
Start: 2024-09-18

## 2024-09-18 RX ORDER — LEVOTHYROXINE SODIUM 100 UG/1
100 TABLET ORAL DAILY
Qty: 90 TABLET | Refills: 1 | Status: SHIPPED | OUTPATIENT
Start: 2024-09-18

## 2024-09-18 NOTE — TELEPHONE ENCOUNTER
Requesting   Requested Prescriptions     Pending Prescriptions Disp Refills    PHENTERMINE HCL 37.5 MG Oral Tab [Pharmacy Med Name: Phentermine HCl Oral Tablet 37.5 MG] 30 tablet 0     Sig: TAKE 1 TABLET BY MOUTH IN THE MORNING before breakfast.       LOV: 5/15/2024  RTC:   Last Relevant Labs:   Filled: 7/29/2024 #30 with 1 refills    Future Appointments   Date Time Provider Department Center   12/12/2024  8:20 AM Rhea Morris MD [facility] [address]   4/15/2025  9:00 AM Nikko Casas MD [facility] [place]

## 2024-09-18 NOTE — TELEPHONE ENCOUNTER
LOV: 7/15/24  RTC: 6 months  Filled: 9/26/23 #90 3 refill   Labs: 7/6/24  Future Appointments   Date Time Provider Department Center   12/12/2024  8:20 AM Rhea Morris MD [facility] [address]   4/15/2025  9:00 AM Nikko Cassa MD [facility] [place]

## 2024-10-22 ENCOUNTER — PATIENT MESSAGE (OUTPATIENT)
Dept: INTERNAL MEDICINE CLINIC | Facility: CLINIC | Age: 54
End: 2024-10-22

## 2024-10-22 DIAGNOSIS — R79.89 HIGH SERUM FOLIC ACID LEVEL: Primary | ICD-10-CM

## 2024-10-27 DIAGNOSIS — E11.9 DIET-CONTROLLED DIABETES MELLITUS (HCC): ICD-10-CM

## 2024-10-30 NOTE — TELEPHONE ENCOUNTER
Requesting   Requested Prescriptions     Pending Prescriptions Disp Refills    FOLIC ACID 1 MG Oral Tab [Pharmacy Med Name: Folic Acid Oral Tablet 1 MG] 90 tablet 0     Sig: TAKE 1 TABLET BY MOUTH EVERY DAY    PHENTERMINE HCL 37.5 MG Oral Tab [Pharmacy Med Name: Phentermine HCl Oral Tablet 37.5 MG] 30 tablet 0     Sig: TAKE 1 TABLET BY MOUTH IN THE MORNING before breakfast.       LOV: 05/15/2024  RTC: 12/12/2024  Filled: Phentermine- 09/18/204  30 tablets  with 0 refills   Folic Acid- 04/20/2024 90 tablets 0 refills     Future Appointments   Date Time Provider Department Center   12/12/2024  8:20 AM Rhea Morris MD [facility] [address]   4/15/2025  9:00 AM Nikko Casas MD [facility]

## 2024-10-31 ENCOUNTER — PATIENT MESSAGE (OUTPATIENT)
Dept: INTERNAL MEDICINE CLINIC | Facility: CLINIC | Age: 54
End: 2024-10-31

## 2024-10-31 RX ORDER — PHENTERMINE HYDROCHLORIDE 37.5 MG/1
37.5 TABLET ORAL
Qty: 30 TABLET | Refills: 0 | Status: SHIPPED | OUTPATIENT
Start: 2024-10-31

## 2024-10-31 RX ORDER — FOLIC ACID 1 MG/1
1 TABLET ORAL DAILY
Qty: 90 TABLET | Refills: 0 | Status: SHIPPED | OUTPATIENT
Start: 2024-10-31

## 2024-11-24 DIAGNOSIS — E11.9 DIET-CONTROLLED DIABETES MELLITUS (HCC): ICD-10-CM

## 2024-11-25 ENCOUNTER — PATIENT MESSAGE (OUTPATIENT)
Dept: INTERNAL MEDICINE CLINIC | Facility: CLINIC | Age: 54
End: 2024-11-25

## 2024-11-25 RX ORDER — PHENTERMINE HYDROCHLORIDE 37.5 MG/1
37.5 TABLET ORAL
Qty: 30 TABLET | Refills: 0 | OUTPATIENT
Start: 2024-11-25

## 2024-12-05 DIAGNOSIS — F41.9 ANXIETY: ICD-10-CM

## 2024-12-06 RX ORDER — ALPRAZOLAM 0.25 MG/1
0.25 TABLET ORAL 2 TIMES DAILY PRN
Qty: 20 TABLET | Refills: 1 | Status: SHIPPED | OUTPATIENT
Start: 2024-12-06

## 2024-12-06 NOTE — TELEPHONE ENCOUNTER
LOV: 7/15/24  RTC: 6 months  Filled: 2/4/24 #20 1 refill   Future Appointments   Date Time Provider Department Center   12/12/2024  8:20 AM Rhea Morris MD [facility] [address]   4/10/2025  9:40 AM Rhea Morris MD [facility] [address]   4/15/2025  9:00 AM Nikko Casas MD [facility] [place]

## 2024-12-12 ENCOUNTER — OFFICE VISIT (OUTPATIENT)
Dept: INTERNAL MEDICINE CLINIC | Facility: CLINIC | Age: 54
End: 2024-12-12
Payer: COMMERCIAL

## 2024-12-12 VITALS
BODY MASS INDEX: 44.84 KG/M2 | RESPIRATION RATE: 20 BRPM | DIASTOLIC BLOOD PRESSURE: 84 MMHG | HEIGHT: 66 IN | HEART RATE: 85 BPM | WEIGHT: 279 LBS | SYSTOLIC BLOOD PRESSURE: 124 MMHG

## 2024-12-12 DIAGNOSIS — Z51.81 THERAPEUTIC DRUG MONITORING: ICD-10-CM

## 2024-12-12 DIAGNOSIS — E66.01 MORBID OBESITY (HCC): ICD-10-CM

## 2024-12-12 DIAGNOSIS — G47.33 OSA (OBSTRUCTIVE SLEEP APNEA): Primary | ICD-10-CM

## 2024-12-12 DIAGNOSIS — E11.9 DIET-CONTROLLED DIABETES MELLITUS (HCC): ICD-10-CM

## 2024-12-12 DIAGNOSIS — I10 ESSENTIAL HYPERTENSION: ICD-10-CM

## 2024-12-12 PROCEDURE — 99214 OFFICE O/P EST MOD 30 MIN: CPT | Performed by: INTERNAL MEDICINE

## 2024-12-12 RX ORDER — PHENTERMINE HYDROCHLORIDE 30 MG/1
30 CAPSULE ORAL EVERY MORNING
Qty: 30 CAPSULE | Refills: 2 | Status: SHIPPED | OUTPATIENT
Start: 2024-12-12

## 2024-12-12 NOTE — PROGRESS NOTES
HISTORY OF PRESENT ILLNESS  Patient presents with:  Chief Complaint   Patient presents with    Weight Check     Down 3 pounds          Soheila Morley is a 54 year old female here for follow up in medical weight loss program.     Denies chest pain, shortness of breath, dizziness, blurred vision, headache, paresthesia, nausea/vomiting.   Down 3 lb   Last appoint was more then 6 months ago.       Does feel cravings have been getting better since being back on it  Blood pressure is stable   Resumed medication       [facility] Follow Up    General Information  Nutrition Recall  Breakfast: English muffin, peanut butter, banana Lunch: leftover spaghetti     Dinner: few slices of ham    Fluids: gatorade zero Dining Out: 2   Exercise   Patient stated exercises # days/week: 0  Patient stated perceived level of   exertion: 1 Anaerobic Days: 0   Aerobic Days: 0   Patient stated average level of stress: 10  Sleep   Patient stated # hours uninterrupted sleep: 5   Patient stated feels   restful: No      Cause of disruption of sleep: mind racing   Goals: handling stress better                  Wt Readings from Last 6 Encounters:   12/12/24 279 lb (126.6 kg)   08/13/24 285 lb (129.3 kg)   07/15/24 285 lb (129.3 kg)   07/09/24 287 lb (130.2 kg)   05/15/24 282 lb (127.9 kg)   02/01/24 267 lb (121.1 kg)            Breakfast Lunch Dinner Snacks Fluids   Reviewed            REVIEW OF SYSTEMS  GENERAL HEALTH: feels well otherwise, denied any fevers chills or night sweats   RESPIRATORY: denies shortness of breath   CARDIOVASCULAR: denies chest pain  GI: denies abdominal pain    EXAM  /84   Pulse 85   Resp 20   Ht 5' 6\" (1.676 m)   Wt 279 lb (126.6 kg)   BMI 45.03 kg/m²   GENERAL: well developed, well nourished,in no apparent distress, A/O x3  SKIN: no rashes,no suspicious lesions  HEENT: atraumatic, normocephalic, OP-clear, PERRL  NECK: supple,no adenopathy  LUNGS: clear to auscultation bilaterally   CARDIO: RRR without murmur  GI:  good BS's,NT/ND, no masses or HSM  EXTREMITIES: no cyanosis, no clubbing, no edema    Lab Results   Component Value Date    WBC 8.3 07/06/2024    RBC 4.52 07/06/2024    HGB 13.7 07/06/2024    HCT 42.2 07/06/2024    MCV 93.4 07/06/2024    MCH 30.3 07/06/2024    MCHC 32.5 07/06/2024    RDW 13.2 07/06/2024    .0 07/06/2024     Lab Results   Component Value Date     (H) 07/06/2024    BUN 15 07/06/2024    BUNCREA NOT APPLICABLE 03/08/2022    CREATSERUM 0.92 07/06/2024    ANIONGAP 7 07/06/2024    GFR 87 09/01/2017    GFRNAA 77 03/08/2022    GFRAA 89 03/08/2022    CA 9.1 07/06/2024    OSMOCALC 292 07/06/2024    ALKPHO 79 07/06/2024    AST 33 07/06/2024    ALT 48 07/06/2024    BILT 0.4 07/06/2024    TP 7.4 07/06/2024    ALB 3.7 07/06/2024    GLOBULIN 3.7 07/06/2024    AGRATIO 1.3 03/08/2022     07/06/2024    K 4.4 07/06/2024     07/06/2024    CO2 26.0 07/06/2024     Lab Results   Component Value Date     07/06/2024    A1C 6.0 (H) 07/06/2024     Lab Results   Component Value Date    CHOLEST 182 07/06/2024    TRIG 124 07/06/2024    HDL 46 07/06/2024     (H) 07/06/2024    VLDL 21 07/06/2024    TCHDLRATIO 4.3 03/08/2022    NONHDLC 136 (H) 07/06/2024     Lab Results   Component Value Date    T4F 1.0 02/20/2019    TSH 0.671 07/06/2024    TSHT4 0.89 06/17/2021     Lab Results   Component Value Date    VITB12 352 10/09/2021     Lab Results   Component Value Date    VITD 42 10/09/2021       Current Outpatient Medications on File Prior to Visit   Medication Sig Dispense Refill    ALPRAZolam 0.25 MG Oral Tab Take 1 tablet (0.25 mg total) by mouth 2 (two) times daily as needed for Anxiety. 20 tablet 1    FOLIC ACID 1 MG Oral Tab TAKE 1 TABLET BY MOUTH EVERY DAY (Patient taking differently: Take 1 tablet (1 mg total) by mouth twice a week.) 90 tablet 0    PHENTERMINE HCL 37.5 MG Oral Tab TAKE 1 TABLET BY MOUTH IN THE MORNING before breakfast. 30 tablet 0    levothyroxine 100 MCG Oral Tab TAKE 1  TABLET BY MOUTH EVERY DAY 90 tablet 1    amLODIPine 10 MG Oral Tab TAKE 1 TABLET BY MOUTH EVERY DAY AT NIGHT 90 tablet 1    escitalopram 10 MG Oral Tab TAKE 1 TABLET BY MOUTH EVERY DAY 90 tablet 3    metFORMIN 500 MG Oral Tab TAKE 1 TABLET BY MOUTH EVERY DAY WITH BREAKFAST 90 tablet 3    losartan 100 MG Oral Tab TAKE 1 TABLET BY MOUTH AT NIGHT 90 tablet 3    Cholecalciferol (VITAMIN D) 50 MCG (2000 UT) Oral Cap Take by mouth.      omega-3 fatty acids 1000 MG Oral Cap Take 2,000 mg by mouth daily.       No current facility-administered medications on file prior to visit.       ASSESSMENT  Analyzed weight data:       Diagnoses and all orders for this visit:    KEISHA (obstructive sleep apnea)  -     Phentermine HCl 30 MG Oral Cap; Take 1 capsule (30 mg total) by mouth every morning.    Essential hypertension  -     Phentermine HCl 30 MG Oral Cap; Take 1 capsule (30 mg total) by mouth every morning.    Therapeutic drug monitoring  -     Phentermine HCl 30 MG Oral Cap; Take 1 capsule (30 mg total) by mouth every morning.    Diet-controlled diabetes mellitus (HCC)  -     Phentermine HCl 30 MG Oral Cap; Take 1 capsule (30 mg total) by mouth every morning.    Morbid obesity (HCC)  -     Phentermine HCl 30 MG Oral Cap; Take 1 capsule (30 mg total) by mouth every morning.              PLAN  Initital consult: 312 lb   Down 3   Down 32 lb  Total time spent on chart review, pre-charting, obtaining history, counseling, and educating, reviewing labs was 30 minutes. Spent time at length discussed goal set and motivation and staying on track.    Reviewed retrial of medication   Resume phentermine start wean to 30 mg   Hold glp1ra   Has never seen dietitian, advised huge benefits in helping her navigate diet.   Schedule for dietitian visit / declines cannot afford at this time  Reviwed contrave as next option   Would like to hold on medication at this time.  Monitor for worsening anxiety sx   Reviewed normal stress testing  No further  zenpep  -advised of side effects and adverse effects of this medication  Sleep study completed and titration testing.   KEISHA- continue present management  Nutrition: low carb diet/ recommended to eat breakfast daily/ regular protein intake  Medication use and side effects reviewed with patient.  Medication contraindications: n/a   Blood pressure stable   Start exercise as tolerated  Follow up with dietitian and psychologist as recommended.  Discussed the role of sleep and stress in weight management.  Counseled on comprehensive weight loss plan including attention to nutrition, exercise and behavior/stress management for success. See patient instruction below for more details.  Discussed strategies to overcome barriers to successful weight loss and weight maintenance  FITTE: ACSM recommendations (150-300 minutes/ week in active weight loss)   Weight Loss consent to treat reviewed and signed     There are no Patient Instructions on file for this visit.    No follow-ups on file.    Patient verbalizes understanding.    Rhea Morris MD      Answers for HPI/ROS submitted by the patient on 12/19/2021  If greater than 5/1O how would you grade your coping mechanisms?: fair

## 2024-12-13 ENCOUNTER — LAB ENCOUNTER (OUTPATIENT)
Dept: LAB | Age: 54
End: 2024-12-13
Attending: INTERNAL MEDICINE
Payer: COMMERCIAL

## 2024-12-13 DIAGNOSIS — R79.89 HIGH SERUM FOLIC ACID LEVEL: ICD-10-CM

## 2024-12-13 LAB — FOLATE SERPL-MCNC: 31.6 NG/ML (ref 5.4–?)

## 2024-12-13 PROCEDURE — 82746 ASSAY OF FOLIC ACID SERUM: CPT

## 2024-12-13 PROCEDURE — 36415 COLL VENOUS BLD VENIPUNCTURE: CPT

## 2025-01-15 ENCOUNTER — PATIENT MESSAGE (OUTPATIENT)
Dept: INTERNAL MEDICINE CLINIC | Facility: CLINIC | Age: 55
End: 2025-01-15

## 2025-01-16 NOTE — TELEPHONE ENCOUNTER
Pt called to follow up on her two mcm to LS. I informed she was not in today, but that our clinical team would try to get ahold of her to try and get this resolved soon for her.

## 2025-01-16 NOTE — TELEPHONE ENCOUNTER
Dr. Yao- See [employer] message. Patient is requesting medication for a yeast infection? Please advise. TY

## 2025-01-17 RX ORDER — FLUCONAZOLE 150 MG/1
150 TABLET ORAL ONCE
Qty: 1 TABLET | Refills: 0 | Status: SHIPPED | OUTPATIENT
Start: 2025-01-17 | End: 2025-01-17

## 2025-02-10 ENCOUNTER — PATIENT MESSAGE (OUTPATIENT)
Dept: INTERNAL MEDICINE CLINIC | Facility: CLINIC | Age: 55
End: 2025-02-10

## 2025-02-11 NOTE — TELEPHONE ENCOUNTER
Please see [facility] with mammogram results from 10/3.     Findings: No dominant mass, suspicious microcalcifications, or areas of architectural distortion appreciated. Benign findings are present which do not merit further evaluation.

## 2025-02-17 ENCOUNTER — PATIENT MESSAGE (OUTPATIENT)
Dept: INTERNAL MEDICINE CLINIC | Facility: CLINIC | Age: 55
End: 2025-02-17

## 2025-02-19 NOTE — TELEPHONE ENCOUNTER
LS: Pended letter per patient request with dates of lipid panel. Please review and sign if you agree, TY

## 2025-03-11 DIAGNOSIS — Z51.81 THERAPEUTIC DRUG MONITORING: ICD-10-CM

## 2025-03-11 DIAGNOSIS — G47.33 OSA (OBSTRUCTIVE SLEEP APNEA): ICD-10-CM

## 2025-03-11 DIAGNOSIS — E66.01 MORBID OBESITY (HCC): ICD-10-CM

## 2025-03-11 DIAGNOSIS — E11.9 DIET-CONTROLLED DIABETES MELLITUS (HCC): ICD-10-CM

## 2025-03-11 DIAGNOSIS — I10 ESSENTIAL HYPERTENSION: ICD-10-CM

## 2025-03-11 RX ORDER — PHENTERMINE HYDROCHLORIDE 30 MG/1
30 CAPSULE ORAL EVERY MORNING
Qty: 30 CAPSULE | Refills: 2 | Status: SHIPPED | OUTPATIENT
Start: 2025-03-11 | End: 2025-04-10

## 2025-03-11 NOTE — TELEPHONE ENCOUNTER
Requesting   Requested Prescriptions     Pending Prescriptions Disp Refills    PHENTERMINE HCL 30 MG Oral Cap [Pharmacy Med Name: Phentermine HCl Oral Capsule 30 MG] 30 capsule 0     Sig: Take 1 capsule by mouth every morning.       LOV: 12/12/24  RTC: 04/10/25  Filled: 12/12/24 #30 with 2 refills    Future Appointments   Date Time Provider Department Center   4/10/2025  9:40 AM Rhea Morris MD [facility]   4/15/2025  9:00 AM Nikko Casas MD [facility] [place]   7/30/2025  8:20 AM Rhea Morris MD [facility]

## 2025-03-12 RX ORDER — AMLODIPINE BESYLATE 10 MG/1
10 TABLET ORAL NIGHTLY
Qty: 90 TABLET | Refills: 0 | Status: SHIPPED | OUTPATIENT
Start: 2025-03-12

## 2025-03-12 RX ORDER — LEVOTHYROXINE SODIUM 100 UG/1
100 TABLET ORAL DAILY
Qty: 90 TABLET | Refills: 0 | Status: SHIPPED | OUTPATIENT
Start: 2025-03-12

## 2025-03-12 NOTE — TELEPHONE ENCOUNTER
Protocol passed     LOV: 7/15/24   RTC: none noted  Filled: 9/18/24 #90 1 refills  Labs: 7/6/24   Future Appointments   Date Time Provider Department Center   4/10/2025  9:40 AM Rhea Morris MD [facility]   4/15/2025  9:00 AM Nikko Casas MD [facility] [place]   7/30/2025  8:20 AM Rhea Morris MD [facility]   9/30/2025  8:40 AM Rhea Morris MD [facility]

## 2025-04-09 NOTE — PROGRESS NOTES
HISTORY OF PRESENT ILLNESS  Chief Complaint   Patient presents with    Weight Check     Up 8        Soheila Morley is a 54 year old female here for follow up in medical weight loss program.       History of Present Illness            Nikko Casas MD   Physician  Specialty: PULMONARY DISEASES     Progress Notes     Signed     Encounter Date: 1/24/2024     Signed       Expand All Collapse All          Pulmonary/Critical Care/Sleep Medicine    Consult Note      PCP: Hope Yao MD   Phone: [phone]   Fax: [phone]                 Chief Complaint   Patient presents with    New Patient    Obstructive Sleep Apnea (KEISHA)       PSG 12/13/23         HPI  I had the pleasure of seeing Soheila Morley who is a pleasant 53 year old female who presents for evaluation of KEISHA        The patient states that she has snored at home in past, she has excessive daytime sleepiness and underwent a sleep study that showed KEISHA, so she presents for sleep evaluation.       She states bed time around 1130 PM . It takes  few min to 1 hour to fall asleep and leaves bed around 730 AM. She wakes up sometimes 3 times a night.  She is sleepy and fatigued during the daytime.  She admits to tossing and turning at night.  She denies nightmares, sleep talking or sleep walking.  She  denies AM headaches.  She denies symptoms sleep attacks, hypnagogic hallucinations, sleep paralysis, or cataplexy.     The patient denies kicking legs at night.          She drinks  2  caffeine cans of soda off and on         She has pets  1 cat that does sleep in bed.           Hx of tobacco use: She  reports that she has never smoked. She has been exposed to tobacco smoke. She has never used smokeless tobacco.     Past Medical History        Past Medical History:   Diagnosis Date    ALCOHOL USE       very rare    Anxiety      Depression      Essential hypertension      Hyperlipidemia      Hypothyroidism      Obesity      Pneumonia due to organism        once as a baby/ once as adult         Past Surgical History         Past Surgical History:   Procedure Laterality Date    CARPAL TUNNEL RELEASE Bilateral      COLONOSCOPY         X2    COLONOSCOPY        D & C        DILATION/CURETTAGE,DIAGNOSTIC        HERNIA SURGERY   09/22/2017     Double hernia, umbilical and ventral    HYSTERECTOMY   10/2013     Ovaries Remain    OTHER SURGICAL HISTORY   02/13/2020     Anal Exam Under Anesthesia Transanal Excision of Anal Canal Polyp    TONSILLECTOMY             Allergies        Allergies   Allergen Reactions    Adhesive Tape RASH         Current Medications          Current Outpatient Medications   Medication Sig Dispense Refill    LOSARTAN 100 MG Oral Tab TAKE 1 TABLET BY MOUTH EVERY DAY AT NIGHT 90 tablet 0    Phentermine HCl 37.5 MG Oral Tab Take 1 tablet (37.5 mg total) by mouth before breakfast. 30 tablet 1    folic acid 1 MG Oral Tab Take 1 tablet (1 mg total) by mouth daily. 90 tablet 0    guaiFENesin-codeine (CHERATUSSIN AC) 100-10 MG/5ML Oral Solution Take 5 mL by mouth nightly as needed for cough. 118 mL 0    AMLODIPINE 10 MG Oral Tab TAKE 1 TABLET BY MOUTH AT NIGHT 90 tablet 0    levothyroxine 100 MCG Oral Tab TAKE 1 TABLET BY MOUTH EVERY DAY 90 tablet 3    escitalopram 10 MG Oral Tab TAKE 1 TABLET BY MOUTH EVERY DAY 90 tablet 1    metFORMIN 500 MG Oral Tab TAKE 1 TABLET BY MOUTH EVERY DAY WITH BREAKFAST 90 tablet 3    ALPRAZolam 0.25 MG Oral Tab Take 1 tablet (0.25 mg total) by mouth 2 (two) times daily as needed for Anxiety. 20 tablet 1    Cholecalciferol (VITAMIN D) 50 MCG (2000 UT) Oral Cap Take by mouth.        omega-3 fatty acids 1000 MG Oral Cap Take 2,000 mg by mouth daily.             Short Social Hx on File   Social History            Socioeconomic History    Marital status:    Occupational History    Occupation:        Employer: [employer]       Comment: [employer]    Occupation: [employer]   Tobacco Use     Smoking status: Never       Passive exposure: Past (Father smoked in home when pt was a child)    Smokeless tobacco: Never   Vaping Use    Vaping Use: Never used   Substance and Sexual Activity    Alcohol use: Yes       Comment: very rare    Drug use: No   Other Topics Concern    Caffeine Concern Yes    Exercise Yes    Seat Belt Yes    Special Diet No    Stress Concern Yes    Weight Concern Yes      Social Determinants of Health           Financial Resource Strain: Low Risk  (12/11/2023)     Financial Resource Strain      Difficulty of Paying Living Expenses: Not hard at all      Med Affordability: No   Food Insecurity: No Food Insecurity (12/11/2023)     Food Insecurity      Food Insecurity: Never true   Transportation Needs: No Transportation Needs (12/11/2023)     Transportation Needs      Lack of Transportation: No   Physical Activity: Inactive (12/11/2023)     Exercise Vital Sign      Days of Exercise per Week: 0 days      Minutes of Exercise per Session: 0 min   Stress: Stress Concern Present (12/11/2023)     Stress      Feeling of Stress : Yes   Social Connections: Somewhat Isolated (12/11/2023)     Social Connections      Frequency of Socialization with Friends and Family: 1   Housing Stability: Low Risk  (12/11/2023)     Housing Stability      Housing Instability: No              Immunization History   Administered Date(s) Administered    Covid-19 Vaccine Pfizer 30 mcg/0.3 ml 03/16/2021, 04/06/2021, 10/22/2021    TDAP 02/22/2018      Family History         Family History   Problem Relation Age of Onset    Heart Disorder Father      Stroke Father      Other (COPD) Father      Diabetes Mother           Diet Controlled    Hypertension Mother      Thyroid Disorder Mother      Cancer Mother           Lung/Skin/Thyroid    Lung Disorder Mother           [place] on lung    Heart Disorder Mother           Dec 27, 2017    Cancer Maternal Grandmother           Liver    Heart Disorder Maternal Grandfather      Stroke  Maternal Grandfather      Diabetes Paternal Grandmother      Heart Attack Paternal Grandmother      Heart Disorder Paternal Grandmother      Stroke Paternal Grandmother      Stroke Paternal Grandfather      Other (Brain Tumor) Paternal Grandfather      Infectious Disease Sister      Other (Prothrombin Gene Mutation) Sister      Other (Blood Clot) Sister      Obesity Sister      Pulmonary Disease Sister           Prothrombin Gene Mutation    Other (Prothrombin Gene Mutation) Sister      Pulmonary Disease Sister           Prothrombin Gene Mutation            Review of Systems   Constitutional:  Positive for fatigue. Negative for fever and unexpected weight change.   HENT:  Negative for congestion, mouth sores, nosebleeds, postnasal drip, rhinorrhea, sore throat and trouble swallowing.    Eyes:  Negative for visual disturbance.   Respiratory:  Negative for apnea, cough, choking, chest tightness, shortness of breath and wheezing.    Cardiovascular:  Negative for chest pain, palpitations and leg swelling.   Gastrointestinal:  Negative for abdominal pain, constipation, diarrhea, nausea and vomiting.   Genitourinary:  Negative for difficulty urinating.   Musculoskeletal:  Positive for back pain. Negative for arthralgias, gait problem and myalgias.   Neurological:  Negative for dizziness, weakness and headaches.   Psychiatric/Behavioral:  Positive for sleep disturbance.              Vitals:     01/24/24 1153   BP: 132/80   Pulse: 86   Resp: 16      SpO2: 95 %      Ht Readings from Last 1 Encounters:   01/24/24 5' 6\" (1.676 m)          Wt Readings from Last 1 Encounters:   01/24/24 269 lb (122 kg)      Body mass index is 43.42 kg/m².      Physical Exam  Constitutional:       General: She is not in acute distress.     Appearance: Normal appearance. She is obese. She is not ill-appearing or diaphoretic.   HENT:      Head: Normocephalic and atraumatic.      Nose: Nose normal. No congestion or rhinorrhea.      Comments: Narrow  nares bilaterally      Mouth/Throat:      Mouth: Mucous membranes are moist.      Pharynx: Oropharynx is clear. No oropharyngeal exudate or posterior oropharyngeal erythema.      Comments: Mallampati class IV palate   Eyes:      Extraocular Movements: Extraocular movements intact.      Pupils: Pupils are equal, round, and reactive to light.   Cardiovascular:      Rate and Rhythm: Normal rate.      Pulses: Normal pulses.      Heart sounds: Normal heart sounds. No murmur heard.  Pulmonary:      Effort: Pulmonary effort is normal. No respiratory distress.      Breath sounds: Normal breath sounds. No wheezing or rhonchi.   Chest:      Chest wall: No tenderness.   Abdominal:      General: Abdomen is flat. Bowel sounds are normal.      Palpations: Abdomen is soft.   Musculoskeletal:         General: Normal range of motion.   Skin:     General: Skin is warm.   Neurological:      General: No focal deficit present.      Mental Status: She is alert and oriented to person, place, and time.   Psychiatric:         Mood and Affect: Mood normal.         Behavior: Behavior normal.         Thought Content: Thought content normal.         Judgment: Judgment normal.               Labs:  Last BMP        Lab Results   Component Value Date     GLU 92 07/08/2023     BUN 13 07/08/2023     CREATSERUM 0.93 07/08/2023     BUNCREA NOT APPLICABLE 03/08/2022     ANIONGAP 8 07/08/2023     GFRAA 89 03/08/2022     GFRNAA 77 03/08/2022     CA 9.5 07/08/2023      07/08/2023     K 4.3 07/08/2023      07/08/2023     CO2 25.0 07/08/2023     OSMOCALC 288 07/08/2023      Last CBC        Lab Results   Component Value Date     WBC 9.3 07/08/2023     RBC 4.95 07/08/2023     HGB 14.8 07/08/2023     HCT 44.4 07/08/2023     MCV 89.7 07/08/2023     MCH 29.9 07/08/2023     MCHC 33.3 07/08/2023     RDW 12.9 07/08/2023     .0 07/08/2023      Last CMP        Lab Results   Component Value Date     GLU 92 07/08/2023     BUN 13 07/08/2023     BUNCREA  NOT APPLICABLE 03/08/2022     CREATSERUM 0.93 07/08/2023     ANIONGAP 8 07/08/2023     GFR 87 09/01/2017     GFRNAA 77 03/08/2022     GFRAA 89 03/08/2022     CA 9.5 07/08/2023     OSMOCALC 288 07/08/2023     ALKPHO 97 07/08/2023     AST 33 07/08/2023     ALT 36 07/08/2023     BILT 0.4 07/08/2023     TP 7.7 07/08/2023     ALB 3.6 07/08/2023     GLOBULIN 4.1 07/08/2023     AGRATIO 1.3 03/08/2022      07/08/2023     K 4.3 07/08/2023      07/08/2023     CO2 25.0 07/08/2023      Last Thyroid Function        Lab Results   Component Value Date     T4F 1.0 02/20/2019     TSH 0.800 07/08/2023     TSHT4 0.89 06/17/2021         Imaging:  None         Study Type: Diagnostic   Procedure: The Polysomnogram was performed with a sleep technologist in attendance at the [facility]. The following parameters were monitored: central, occipital and temporal EEG, EOG, submentalis EMG, nasal flow, nasal pressure, respiratory inductance plethysmography and oxygen saturation via continuous pulse oximetry. Sleep stages, periodic limb movements and EEG arousals were scored in accordance with the American Academy of Sleep Medicine Manual for the Scoring of Sleep and Associated Events. Apnea Hypopnea Index was calculated using the recommended definition of hypopnea for scoring respiratory events. Data acquisition, collection and scoring have been validated and clinically correlated.   Sleep History: BENITA ANDINO is a 53 year old Female referred by QUINCY KRAFT for the evaluation of suspected obstructive sleep apnea.   Past Medical History is pertinent for Hypersomnia, Hypertension, Diabetes Mellitus, Anxiety, Depresson, Obesity.   At the time of the study, the patient was prescribed the following medications: Phentemine HCI, Folic Acid, Levothyroxine, Escitalopram, Amlodipine, Metformin, Losartan, Alprazolam, Vitamin D, Omega-3.   The patient was studied from 09:26:26 PM to 04:52:22 AM, with a total  recording time of 445.9, total sleep time of 371.0 and a sleep efficiency of 83.2%. Wake after sleep onset was 60.5 minutes. The percentage of total sleep time by sleep stage is as follows: Stage 1 7.3%, Stage 2 78.0%, Stage 3 6.1% and Stage REM 8.6%.   Respiratory Analysis: The Apnea-Hypopnea Index (AHI) was 20.5 events per hour. REM related AHI was 33.8 events per hour. Supine related AHI was 22.2. Lateral related AHI was 9.8. The Central Apnea Index was 0. The oxygen saturation mary was 81.0% and patient spent 0.9% with saturations less than 88%.   Snoring Profile: Snoring was moderate.   Cardiac Profile: Electrocardiogram demonstrated normal sinus rhythm.   EEG Profile: Based on the limited recording montage, there were no significant EEG abnormalities noted. There were 105 arousals, with a total arousal index of 17.0.   Periodic Limb Movements: PLM index of 0. PLM arousal index of 0.   IMPRESSIONS: This study demonstrates moderate obstructive sleep apnea with desaturations.. The KEISHA becomes severe in REM.   Diagnosis: Obstructive Sleep Apnea G47.33   RECOMMENDATIONS:   1. It is recommended to proceed with CPAP titration.   2. The patient should avoid alcohol and sedative medications, as these may worsen severity of symptoms.   3. The patient should avoid drowsy driving.   4. Patient to follow up with the referring physician.      Thank you for allowing me to participate in this patient's care. Please do not hesitate to contact me with any additional questions.   Dictated by: Dr. Leger   Electronically signed by Uzair Leger MD   (Electronic Signature)   Board Certified in Sleep Medicine       This is a 53 year old female who presents with the following symptoms, risk factors, behaviors or other items associated with sleep problems.     Sleep Apnea:   snoring; coughing; restless sleep; overweight; high blood pressure; family member with sleep apnea; previous sleep study; previous CPAP use  Insomnia:   difficulty falling asleep; restless sleep; mind racing; depresssion; anxiety; job stress  Restless Leg:  urge to move legs when trying to sleep; tingling or crawly feeling in the legs  Parasomnias:   no symptoms of parasomnias  Daytime Problems:  previous sleep study                  [place] Sleepiness Scale: (ESS) score on today's visit is 4  out of 24.     Score total of 1-6          Normal sleep   Score total of 7-8          Average sleepiness   Score total of 9-24        Abnormal (possibly pathologic) sleepiness         Impression:     Obstructive sleep apnea syndrome (OSAS): Attended sleep study performed on 12/13/2023 showed  Apnea-Hypopnea Index (AHI) was 20.5 events per hour. REM related AHI was 33.8 events per hour. Supine related AHI was 22.2. Lateral related AHI was 9.8. The Central Apnea Index was 0. The oxygen saturation mary was 81.0% and patient spent 0.9% with saturations less than 88%.   Daytime hypersomnolence/fatigue  Obesity: Class IV ;  Body mass index is 43.42 kg/m².  Anxiety   Depression  Hyperlipidemia   Hypothyroidism                                Plan:     Schedule CPAP titration sleep study to be interpreted by Dr. Nikok Casas, will then arrange a NEW CPAP machine   Advised about weight loss   Advised against drowsy driving and to avoid alcoholic beverage and respiratory depressants as these may worsen sleep apnea     Follow up:  3  months      Thank you for allowing me to participate in your patient care.     JASVIR Casas MD, FACP, FCCP, FAA - Pulmonary/Critical care/Sleep Medicine  Please contact our office if you have any questions or concerns at [phone]            Electronically signed by Nikko Casas MD at 1/24/2024 12:23 PM               Soheila Morley is a 54 year old female who presents with challenges in weight management and emotional eating.    She experiences significant challenges in managing her weight, primarily due to emotional eating. She identifies as an  'emotional eater' and notes that recent work-related stress has exacerbated her eating habits. She feels a lack of control over her eating, despite recognizing it as something she should be able to manage. This has led to feelings of mental exhaustion and a lack of energy, impacting her ability to focus on self-care and weight management.    She describes feeling mentally exhausted and angry, which has led to withdrawal from social situations. She experiences brain fog and quickness to anger, attributing these symptoms to possible menopause. She is frustrated with the complexity of managing her health, including confusion over dietary and supplement advice.    She has been using phentermine but reports that her mental state has not been conducive to maintaining a healthy lifestyle. Despite its use, she continues to struggle with mindless eating and has not found long-term success with the medication.    She has a history of sleep apnea and uses a CPAP machine. She mentions that her sleep issues and mood disturbances may be related to her thyroid, which has not been checked recently. Her last A1c was 6.0 in July of the previous year, and she is concerned about the potential progression to diabetes.    She reports financial stress related to unexpected medical bills, which adds to her overall stress and feelings of being overwhelmed.    Wt Readings from Last 6 Encounters:   04/10/25 287 lb (130.2 kg)   12/12/24 279 lb (126.6 kg)   08/13/24 285 lb (129.3 kg)   07/15/24 285 lb (129.3 kg)   07/09/24 287 lb (130.2 kg)   05/15/24 282 lb (127.9 kg)              Breakfast Lunch Dinner Snacks Fluids   Reviewed           REVIEW OF SYSTEMS  GENERAL HEALTH: feels well otherwise, denied any fevers chills or night sweats   RESPIRATORY: denies shortness of breath   CARDIOVASCULAR: denies chest pain  GI: denies abdominal pain    EXAM  /82   Pulse 89   Ht 5' 6\" (1.676 m)   Wt 287 lb (130.2 kg)   BMI 46.32 kg/m²   GENERAL:  well developed, well nourished,in no apparent distress, A/O x3  SKIN: no rashes,no suspicious lesions  HEENT: atraumatic, normocephalic, OP-clear, PERRL  NECK: supple,no adenopathy  LUNGS: clear to auscultation bilaterally   CARDIO: RRR without murmur  GI: good BS's,NT/ND, no masses or HSM  EXTREMITIES: no cyanosis, no clubbing, no edema    Lab Results   Component Value Date    WBC 8.3 07/06/2024    RBC 4.52 07/06/2024    HGB 13.7 07/06/2024    HCT 42.2 07/06/2024    MCV 93.4 07/06/2024    MCH 30.3 07/06/2024    MCHC 32.5 07/06/2024    RDW 13.2 07/06/2024    .0 07/06/2024     Lab Results   Component Value Date     (H) 07/06/2024    BUN 15 07/06/2024    BUNCREA NOT APPLICABLE 03/08/2022    CREATSERUM 0.92 07/06/2024    ANIONGAP 7 07/06/2024    GFR 87 09/01/2017    GFRNAA 77 03/08/2022    GFRAA 89 03/08/2022    CA 9.1 07/06/2024    OSMOCALC 292 07/06/2024    ALKPHO 79 07/06/2024    AST 33 07/06/2024    ALT 48 07/06/2024    BILT 0.4 07/06/2024    TP 7.4 07/06/2024    ALB 3.7 07/06/2024    GLOBULIN 3.7 07/06/2024    AGRATIO 1.3 03/08/2022     07/06/2024    K 4.4 07/06/2024     07/06/2024    CO2 26.0 07/06/2024     Lab Results   Component Value Date     07/06/2024    A1C 6.0 (H) 07/06/2024     Lab Results   Component Value Date    CHOLEST 182 07/06/2024    TRIG 124 07/06/2024    HDL 46 07/06/2024     (H) 07/06/2024    VLDL 21 07/06/2024    TCHDLRATIO 4.3 03/08/2022    NONHDLC 136 (H) 07/06/2024     Lab Results   Component Value Date    T4F 1.0 02/20/2019    TSH 0.671 07/06/2024    TSHT4 0.89 06/17/2021     Lab Results   Component Value Date    VITB12 352 10/09/2021     Lab Results   Component Value Date    VITD 42 10/09/2021       Medications Ordered Prior to Encounter[1]    ASSESSMENT  Analyzed weight data:     Title    [facility] Information     Initial Body Fat %: 47.5           Date of Initial Weight: 10/6/2021       Initial Weight: 308               Have any comorbidities  improved since the last visit?:        Hypertension DM 2 CAD Dyslipidemia Osteoarthritis Sleep Apnea              Diagnoses and all orders for this visit:    KEISHA (obstructive sleep apnea)  -     Tirzepatide-Weight Management (ZEPBOUND) 2.5 MG/0.5ML Subcutaneous Solution Auto-injector; Inject 2.5 mg into the skin once a week.  -     Hemoglobin A1C; Future  -     B12 AND FOLATE; Future  -     Vitamin D; Future  -     Lipid Panel; Future  -     TSH and Free T4 [E]; Future  -     OP REFERRAL TO [facility]    Morbid obesity (HCC)  -     Tirzepatide-Weight Management (ZEPBOUND) 2.5 MG/0.5ML Subcutaneous Solution Auto-injector; Inject 2.5 mg into the skin once a week.  -     Hemoglobin A1C; Future  -     B12 AND FOLATE; Future  -     Vitamin D; Future  -     Lipid Panel; Future  -     TSH and Free T4 [E]; Future  -     OP REFERRAL TO [facility]    Essential hypertension  -     Hemoglobin A1C; Future  -     B12 AND FOLATE; Future  -     Vitamin D; Future  -     Lipid Panel; Future  -     TSH and Free T4 [E]; Future  -     OP REFERRAL TO [facility]    Therapeutic drug monitoring  -     Hemoglobin A1C; Future  -     B12 AND FOLATE; Future  -     Vitamin D; Future  -     Lipid Panel; Future  -     TSH and Free T4 [E]; Future  -     OP REFERRAL TO [facility]    Diet-controlled diabetes mellitus (HCC)  -     Hemoglobin A1C; Future  -     B12 AND FOLATE; Future  -     Vitamin D; Future  -     Lipid Panel; Future  -     TSH and Free T4 [E]; Future  -     OP REFERRAL TO [facility]    Anxiety and depression  -     Hemoglobin A1C; Future  -     B12 AND FOLATE; Future  -     Vitamin D; Future  -     Lipid Panel; Future  -     TSH and Free T4 [E]; Future  -     OP REFERRAL TO [facility]    Acquired hypothyroidism  -     TSH and Free T4 [E]; Future  -     OP REFERRAL TO [facility]        PLAN  Assessment & Plan  Obesity  Emotional eating due to stress and mental exhaustion. Phentermine ineffective. Discussed Zepbound for weight loss and sleep apnea.  Potential side effects include nausea and diarrhea. Insurance coverage pursued for sleep apnea indication.  - Recheck labs: A1c, thyroid, cholesterol, vitamin levels.  - Pursue insurance coverage for Zepbound.  - Discuss Zepbound side effects: nausea, diarrhea.  - Encourage therapy for emotional support, confirm insurance coverage.    Sleep Apnea  Moderate sleep apnea managed with CPAP. Zepbound may aid weight loss and reduce CPAP dependence. Coverage pursued for sleep apnea indication.  - Pursue insurance coverage for Zepbound.    Menopausal Symptoms  Symptoms may contribute to emotional eating and mental exhaustion.  - Consider addressing menopausal symptoms with weight management.    Follow-up  Close monitoring required due to potential new medication and ongoing challenges.  - Schedule follow-up in one month to assess interventions and adjust treatment.    There are no Patient Instructions on file for this visit.    No follow-ups on file.    Patient verbalizes understanding.    Rhea Morris MD           [1]   Current Outpatient Medications on File Prior to Visit   Medication Sig Dispense Refill    fluconazole 150 MG Oral Tab Take 1 tablet (150 mg total) by mouth one time. FOR ONE DOSE      AMLODIPINE 10 MG Oral Tab TAKE 1 TABLET BY MOUTH EVERY DAY AT NIGHT 90 tablet 0    LEVOTHYROXINE 100 MCG Oral Tab TAKE 1 TABLET BY MOUTH EVERY DAY 90 tablet 0    ALPRAZolam 0.25 MG Oral Tab Take 1 tablet (0.25 mg total) by mouth 2 (two) times daily as needed for Anxiety. 20 tablet 1    escitalopram 10 MG Oral Tab TAKE 1 TABLET BY MOUTH EVERY DAY 90 tablet 3    metFORMIN 500 MG Oral Tab TAKE 1 TABLET BY MOUTH EVERY DAY WITH BREAKFAST 90 tablet 3    losartan 100 MG Oral Tab TAKE 1 TABLET BY MOUTH AT NIGHT 90 tablet 3    Cholecalciferol (VITAMIN D) 50 MCG (2000 UT) Oral Cap Take by mouth.       No current facility-administered medications on file prior to visit.

## 2025-04-10 ENCOUNTER — OFFICE VISIT (OUTPATIENT)
Dept: INTERNAL MEDICINE CLINIC | Facility: CLINIC | Age: 55
End: 2025-04-10
Payer: COMMERCIAL

## 2025-04-10 ENCOUNTER — LAB ENCOUNTER (OUTPATIENT)
Dept: LAB | Age: 55
End: 2025-04-10
Attending: INTERNAL MEDICINE
Payer: COMMERCIAL

## 2025-04-10 VITALS
HEART RATE: 89 BPM | WEIGHT: 287 LBS | DIASTOLIC BLOOD PRESSURE: 82 MMHG | HEIGHT: 66 IN | SYSTOLIC BLOOD PRESSURE: 120 MMHG | BODY MASS INDEX: 46.12 KG/M2

## 2025-04-10 DIAGNOSIS — Z51.81 THERAPEUTIC DRUG MONITORING: ICD-10-CM

## 2025-04-10 DIAGNOSIS — E66.01 MORBID OBESITY (HCC): ICD-10-CM

## 2025-04-10 DIAGNOSIS — F41.9 ANXIETY AND DEPRESSION: ICD-10-CM

## 2025-04-10 DIAGNOSIS — E11.9 DIET-CONTROLLED DIABETES MELLITUS (HCC): ICD-10-CM

## 2025-04-10 DIAGNOSIS — G47.33 OSA (OBSTRUCTIVE SLEEP APNEA): ICD-10-CM

## 2025-04-10 DIAGNOSIS — E03.9 ACQUIRED HYPOTHYROIDISM: ICD-10-CM

## 2025-04-10 DIAGNOSIS — F32.A ANXIETY AND DEPRESSION: ICD-10-CM

## 2025-04-10 DIAGNOSIS — I10 ESSENTIAL HYPERTENSION: ICD-10-CM

## 2025-04-10 DIAGNOSIS — G47.33 OSA (OBSTRUCTIVE SLEEP APNEA): Primary | ICD-10-CM

## 2025-04-10 LAB
CHOLEST SERPL-MCNC: 172 MG/DL (ref ?–200)
EST. AVERAGE GLUCOSE BLD GHB EST-MCNC: 134 MG/DL (ref 68–126)
FASTING PATIENT LIPID ANSWER: NO
FOLATE SERPL-MCNC: 25.8 NG/ML (ref 5.4–?)
HBA1C MFR BLD: 6.3 % (ref ?–5.7)
HDLC SERPL-MCNC: 49 MG/DL (ref 40–59)
LDLC SERPL CALC-MCNC: 95 MG/DL (ref ?–100)
NONHDLC SERPL-MCNC: 123 MG/DL (ref ?–130)
T4 FREE SERPL-MCNC: 1.5 NG/DL (ref 0.8–1.7)
TRIGL SERPL-MCNC: 159 MG/DL (ref 30–149)
TSI SER-ACNC: 1.12 UIU/ML (ref 0.55–4.78)
VIT B12 SERPL-MCNC: 399 PG/ML (ref 211–911)
VIT D+METAB SERPL-MCNC: 55.1 NG/ML (ref 30–100)
VLDLC SERPL CALC-MCNC: 26 MG/DL (ref 0–30)

## 2025-04-10 PROCEDURE — 82746 ASSAY OF FOLIC ACID SERUM: CPT

## 2025-04-10 PROCEDURE — 80061 LIPID PANEL: CPT

## 2025-04-10 PROCEDURE — 83036 HEMOGLOBIN GLYCOSYLATED A1C: CPT

## 2025-04-10 PROCEDURE — 84439 ASSAY OF FREE THYROXINE: CPT

## 2025-04-10 PROCEDURE — 36415 COLL VENOUS BLD VENIPUNCTURE: CPT

## 2025-04-10 PROCEDURE — 99214 OFFICE O/P EST MOD 30 MIN: CPT | Performed by: INTERNAL MEDICINE

## 2025-04-10 PROCEDURE — 84443 ASSAY THYROID STIM HORMONE: CPT

## 2025-04-10 PROCEDURE — 82607 VITAMIN B-12: CPT

## 2025-04-10 PROCEDURE — 82306 VITAMIN D 25 HYDROXY: CPT

## 2025-04-10 RX ORDER — AMOXICILLIN 500 MG/1
500 CAPSULE ORAL 3 TIMES DAILY
COMMUNITY
Start: 2025-01-10 | End: 2025-04-10

## 2025-04-10 RX ORDER — FLUCONAZOLE 150 MG/1
150 TABLET ORAL ONCE
COMMUNITY
Start: 2025-01-17

## 2025-04-10 RX ORDER — TIRZEPATIDE 2.5 MG/.5ML
2.5 INJECTION, SOLUTION SUBCUTANEOUS WEEKLY
Qty: 2 ML | Refills: 0 | Status: SHIPPED | OUTPATIENT
Start: 2025-04-10

## 2025-04-10 NOTE — PROGRESS NOTES
The following individual(s) verbally consented to be recorded using ambient AI listening technology and understand that they can each withdraw their consent to this listening technology at any point by asking the clinician to turn off or pause the recording:    Patient name: Soheila Morley  Additional names:  n/a

## 2025-04-14 NOTE — PROGRESS NOTES
BENITA ANDINO  2025 - 2025  Patient ID: [id number]  : 1970  Age: 54 years  Gender: Female  [place] ([id number])  [place]  Compliance Report  Usage 2025 - 2025  Usage days 70/90 days (78%)  >= 4 hours 62 days (69%)  < 4 hours 8 days (9%)  Usage hours 402 hours 3 minutes  Average usage (total days) 4 hours 28 minutes  Average usage (days used) 5 hours 45 minutes  Median usage (days used) 5 hours 52 minutes  Total used hours (value since last reset - 2025) 1,319 hours  AirSense 11 AutoSet  Serial number [id number]  Mode AutoSet  Min Pressure 10 cmH2O  Max Pressure 20 cmH2O  EPR Ramp Only  EPR level 1  Response Standard  Therapy  Pressure - cmH2O Median: 11.3 95th percentile: 14.0 Maximum: 15.3  Leaks - L/min Median: 10.7 95th percentile: 28.4 Maximum: 40.5  Events per hour AI: 0.6 HI: 0.3 AHI: 0.9  Apnea Index Central: 0.1 Obstructive: 0.4 Unknown: 0.1  RERA Index 0.0  Cheyne-Gonsalez respiration (average duration per night) 0 minutes (0%)

## 2025-04-14 NOTE — PROGRESS NOTES
[facility] PULMONARY  SLEEP PROGRESS NOTE        BENITA MORLEY is a 54 year old female who presents today for 8 month f/u      Chief Complaint   Patient presents with    Obstructive Sleep Apnea (KEISHA)     Dme:  Mask:                The following individual(s) verbally consented to be recorded using ambient AI listening technology and understand that they can each withdraw their consent to this listening technology at any point by asking the clinician to turn off or pause the recording:    Patient name: Benita Morley  Additional names:  NA    History of Present Illness  The patient, with a history of sleep apnea, presents for an eight-month follow-up regarding her CPAP machine usage.     She reports inconsistent usage of the CPAP machine, with some nights being more comfortable than others. The patient describes difficulty adjusting to the machine, particularly due to her tendency to move around during sleep. She prefers the nasal pillows to a full face mask, as it feels less confining and allows her to drink water during the night. However, she often wakes up to an \"adjust\" notification on the machine, suggesting possible mask leaks. The patient also reports a persistent difficulty with waking up in the morning, regardless of the time, and does not feel any improvement in this area since starting CPAP therapy.    In addition to her sleep apnea, the patient mentions back pain that affects her sleep position and, potentially, her CPAP usage.     The patient is also struggling with weight loss and is in contact with a weight loss clinic. She is considering a new medication, Zepfil, but is waiting for insurance approval due to the high cost.    [place] score: 5/24    In bed 11-1130p  Out of bed 630a  SL generally quick, within 30 min  Nighttime awakenings multiple adjustments in bed - mask and back  Naps 15-20 min at lunch  Caffeine 1 energy drink 5x weekly, and coke zero some days     Denies teeth grinding, leg cramps,  restless legs, headaches, tinnitus, chest pain, thoracic back pain, bloating, drowsy driving, sleep walking, sleep talking    DME company: [employer]   Mask type: nasal pillows    BENITA ANDINO  2025 - 2025  Patient ID: [id number]  : 1970  Age: 54 years  Gender: Female  [place] ([id number])  [place]  Compliance Report  Usage 2025 - 2025  Usage days 70/90 days (78%)  >= 4 hours 62 days (69%)  < 4 hours 8 days (9%)  Usage hours 402 hours 3 minutes  Average usage (total days) 4 hours 28 minutes  Average usage (days used) 5 hours 45 minutes  Median usage (days used) 5 hours 52 minutes  Total used hours (value since last reset - 2025) 1,319 hours  AirSense 11 AutoSet  Serial number [id number]  Mode AutoSet  Min Pressure 10 cmH2O  Max Pressure 20 cmH2O  EPR Ramp Only  EPR level 1  Response Standard  Therapy  Pressure - cmH2O Median: 11.3 95th percentile: 14.0 Maximum: 15.3  Leaks - L/min Median: 10.7 95th percentile: 28.4 Maximum: 40.5  Events per hour AI: 0.6 HI: 0.3 AHI: 0.9  Apnea Index Central: 0.1 Obstructive: 0.4 Unknown: 0.1  RERA Index 0.0  Cheyne-Gonsalez respiration (average duration per night) 0 minutes (0%)       Patient: Sleep review of systems today: see form.    2023 PSG  Respiratory Analysis: The Apnea-Hypopnea Index (AHI) was 20.5 events per hour. REM related AHI was 33.8 events per hour. Supine related AHI was 22.2. Lateral related AHI was 9.8. The Central Apnea Index was 0. The oxygen saturation mary was 81.0% and patient spent 0.9% with saturations less than 88%.       Pt  PCP:  Hope Yao MD  No referring provider defined for this encounter.           No data to display                  Past Medical History[1]  Past Surgical History[2]  Social History:  Social History     Social History Narrative    Not on file     Short Social Hx on File[3]  Family History:  Family History[4]  Allergies:  Allergies[5]  Current Meds:  Current Medications[6]   Counseling  given: Not Answered         Problem List:  Problem List[7]    REVIEW OF SYSTEMS:   Review of Systems  See HPI     EXAM:   /74 (BP Location: Right arm, Patient Position: Sitting, Cuff Size: large)   Pulse 94   Resp 18   Ht 5' 6\" (1.676 m)   Wt 287 lb (130.2 kg)   SpO2 97%   BMI 46.32 kg/m²  Estimated body mass index is 46.32 kg/m² as calculated from the following:    Height as of this encounter: 5' 6\" (1.676 m).    Weight as of this encounter: 287 lb (130.2 kg).   Neck in inches:      Wt Readings from Last 6 Encounters:   04/15/25 287 lb (130.2 kg)   04/10/25 287 lb (130.2 kg)   12/12/24 279 lb (126.6 kg)   08/13/24 285 lb (129.3 kg)   07/15/24 285 lb (129.3 kg)   07/09/24 287 lb (130.2 kg)     BP Readings from Last 3 Encounters:   04/15/25 124/74   04/10/25 120/82   12/12/24 124/84     Pulse Readings from Last 3 Encounters:   04/15/25 94   04/10/25 89   12/12/24 85     SpO2 Readings from Last 3 Encounters:   04/15/25 97%   08/13/24 100%   07/15/24 97%      Patient weight not recorded    Vital signs reviewed.  Physical Exam  Vitals and nursing note reviewed.   Constitutional:       Appearance: Normal appearance. She is obese.   HENT:      Head: Normocephalic and atraumatic.      Right Ear: External ear normal.      Left Ear: External ear normal.   Pulmonary:      Effort: Pulmonary effort is normal. No respiratory distress.   Musculoskeletal:      Cervical back: Normal range of motion and neck supple.   Neurological:      General: No focal deficit present.      Mental Status: She is alert and oriented to person, place, and time.   Psychiatric:         Attention and Perception: Attention and perception normal.         Mood and Affect: Mood and affect normal.         Speech: Speech normal.         Behavior: Behavior normal. Behavior is cooperative.         Thought Content: Thought content normal.         Cognition and Memory: Cognition and memory normal.         Judgment: Judgment normal.             Assessment & Plan  Obstructive Sleep Apnea (KEISHA)- moderate AHI 20.5  Moderate to severe KEISHA with CPAP reducing apneic events to 0.9 per hour. Issues with mask fit and mouth breathing causing air leaks and dry mouth.   - Have respiratory therapist assess CPAP mask fit and explore different sizes or options.  - Consider use of a chin strap or mouth tape to prevent mouth opening during sleep.  - Encourage consistent CPAP use for at least 4 hours per night to meet insurance compliance.  - Schedule follow-up in 6 months to reassess CPAP usage and effectiveness.    Patient is using and benefiting from regular cpap use.  Patient was instructed to clean equipment on a weekly basis.  Patient was instructed to keep up to date with supplies.  Patient was informed to avoid drowsy driving, or using heavy machinery.    Obesity BMI 46.32  Consideration for Zepbound (semaglutide) due to moderate to severe sleep apnea. Insurance coverage issues with high cost. Prior authorization pursued.  - Await response from weight loss clinic regarding insurance coverage for Zepbound.   - She is a good candidate for Zepbound considering her BMI and moderate KEISHA and would greatly benefit        There are no Patient Instructions on file for this visit.    Independent interpretation of Sleep Download as defined above.  Continue with Rx management of Sleep apnea with PAP therapy.    COMPLIANCE is required by insurance for 4 hours a night most nights of the week.    Advised if still with sleep apnea and not using CPAP has a 7 fold increase in risk of heart attack, stroke, abnormal heart rhythm  and death,  increased risk of driving accidents.     Advised to refrain from driving when sleepy.      Recommend weight loss, and maintain and optimal BMI with Exercise 30 minutes most days to target heart rate .     Advised patient to change filters,masks,hoses  and tubes and equiptment on a  regular schedule.    Filters and seals shall be changed every  1 month,  Hoses every 3 months,   CPAP mask and humidifier chamber changed every 6 month  with the durable medical equipment provider.         Meds & Refills for this Visit:  Requested Prescriptions      No prescriptions requested or ordered in this encounter       Outcome: Parent verbalizes understanding. Parent is notified to call with any questions, complications, allergies, or worsening or changing symptoms.  Parent is to call with any side effects or complications from the treatments as a result of today.     \" This note was created utilizing Dragon speech recognition software.  Please excuse any grammatical errors. Call my office if you have any questions regarding this note. \"     Kasia Ghosh PA-C           [1]   Past Medical History:   ALCOHOL USE    very rare    Anxiety    Depression    Essential hypertension    Hyperlipidemia    Hypothyroidism    Obesity    Pneumonia due to organism    once as a baby/ once as adult   [2]   Past Surgical History:  Procedure Laterality Date    Carpal tunnel release Bilateral     Colonoscopy      X2    Colonoscopy      D & c      Dilation/curettage,diagnostic      Hernia surgery  09/22/2017    Double hernia, umbilical and ventral    Hysterectomy  10/2013    Ovaries Remain    Other surgical history  02/13/2020    Anal Exam Under Anesthesia Transanal Excision of Anal Canal Polyp    Tonsillectomy     [3]   Social History  Socioeconomic History    Marital status:    Occupational History    Occupation:      Employer: [employer]     Comment: [employer]    Occupation: [employer]   Tobacco Use    Smoking status: Never     Passive exposure: Past (Father smoked in home when pt was a child)    Smokeless tobacco: Never   Vaping Use    Vaping status: Never Used   Substance and Sexual Activity    Alcohol use: Yes     Comment: very rare    Drug use: No   Other Topics Concern    Caffeine Concern Yes    Exercise Yes    Seat Belt Yes     Special Diet No    Stress Concern Yes    Weight Concern Yes     Social Drivers of Health     Food Insecurity: No Food Insecurity (12/11/2023)    Food Insecurity     Food Insecurity: Never true   Transportation Needs: No Transportation Needs (12/11/2023)    Transportation Needs     Lack of Transportation: No   Stress: Stress Concern Present (12/11/2023)    Stress     Feeling of Stress : Yes   Housing Stability: Low Risk  (12/11/2023)    Housing Stability     Housing Instability: No   [4]   Family History  Problem Relation Age of Onset    Heart Disorder Father     Stroke Father     Other (COPD) Father     Heart Disease Father         Heart Attack    Diabetes Mother         Diet Controlled    Hypertension Mother     Thyroid Disorder Mother     Cancer Mother         Lung/Skin/Thyroid    Lung Disorder Mother         Turmor on lung    Heart Disorder Mother         Dec 27, 2017    Heart Disease Mother         Heart Attack    Cancer Maternal Grandmother         Liver    Heart Disorder Maternal Grandfather     Stroke Maternal Grandfather     Diabetes Paternal Grandmother     Heart Attack Paternal Grandmother     Heart Disorder Paternal Grandmother     Stroke Paternal Grandmother     Stroke Paternal Grandfather     Other (Brain Tumor) Paternal Grandfather     Infectious Disease Sister     Other (Prothrombin Gene Mutation) Sister     Other (Blood Clot) Sister     Obesity Sister     Pulmonary Disease Sister         Prothrombin Gene Mutation    Other (Prothrombin Gene Mutation) Sister     Pulmonary Disease Sister         Prothrombin Gene Mutation   [5]   Allergies  Allergen Reactions    Adhesive Tape RASH   [6]   Current Outpatient Medications   Medication Sig Dispense Refill    fluconazole 150 MG Oral Tab Take 1 tablet (150 mg total) by mouth one time. FOR ONE DOSE      AMLODIPINE 10 MG Oral Tab TAKE 1 TABLET BY MOUTH EVERY DAY AT NIGHT 90 tablet 0    LEVOTHYROXINE 100 MCG Oral Tab TAKE 1 TABLET BY MOUTH EVERY DAY 90 tablet 0     ALPRAZolam 0.25 MG Oral Tab Take 1 tablet (0.25 mg total) by mouth 2 (two) times daily as needed for Anxiety. 20 tablet 1    escitalopram 10 MG Oral Tab TAKE 1 TABLET BY MOUTH EVERY DAY 90 tablet 3    metFORMIN 500 MG Oral Tab TAKE 1 TABLET BY MOUTH EVERY DAY WITH BREAKFAST 90 tablet 3    losartan 100 MG Oral Tab TAKE 1 TABLET BY MOUTH AT NIGHT 90 tablet 3    Cholecalciferol (VITAMIN D) 50 MCG (2000 UT) Oral Cap Take by mouth.      Tirzepatide-Weight Management (ZEPBOUND) 2.5 MG/0.5ML Subcutaneous Solution Auto-injector Inject 2.5 mg into the skin once a week. (Patient not taking: Reported on 4/15/2025) 2 mL 0   [7]   Patient Active Problem List  Diagnosis    Essential hypertension    Anxiety and depression    Acquired hypothyroidism    History of hysterectomy, supracervical    Umbilical hernia    Ventral hernia    Ventral hernia without obstruction or gangrene    Class 3 severe obesity due to excess calories with serious comorbidity and body mass index (BMI) of 45.0 to 49.9 in adult (HCC)    Hypertriglyceridemia    Elevated fasting glucose    External hemorrhoid    Anal polyp    KEISHA (obstructive sleep apnea)    Daytime hypersomnolence    Diet-controlled diabetes mellitus ([facility])    Therapeutic drug monitoring

## 2025-04-15 ENCOUNTER — TELEPHONE (OUTPATIENT)
Facility: CLINIC | Age: 55
End: 2025-04-15

## 2025-04-15 ENCOUNTER — OFFICE VISIT (OUTPATIENT)
Facility: CLINIC | Age: 55
End: 2025-04-15
Payer: COMMERCIAL

## 2025-04-15 VITALS
RESPIRATION RATE: 18 BRPM | DIASTOLIC BLOOD PRESSURE: 74 MMHG | BODY MASS INDEX: 46.12 KG/M2 | OXYGEN SATURATION: 97 % | WEIGHT: 287 LBS | HEIGHT: 66 IN | SYSTOLIC BLOOD PRESSURE: 124 MMHG | HEART RATE: 94 BPM

## 2025-04-15 DIAGNOSIS — G47.33 OSA (OBSTRUCTIVE SLEEP APNEA): Primary | ICD-10-CM

## 2025-04-15 DIAGNOSIS — E66.01 CLASS 3 SEVERE OBESITY DUE TO EXCESS CALORIES WITH SERIOUS COMORBIDITY AND BODY MASS INDEX (BMI) OF 45.0 TO 49.9 IN ADULT (HCC): ICD-10-CM

## 2025-04-15 DIAGNOSIS — E66.813 CLASS 3 SEVERE OBESITY DUE TO EXCESS CALORIES WITH SERIOUS COMORBIDITY AND BODY MASS INDEX (BMI) OF 45.0 TO 49.9 IN ADULT (HCC): ICD-10-CM

## 2025-04-15 PROCEDURE — 99214 OFFICE O/P EST MOD 30 MIN: CPT | Performed by: PHYSICIAN ASSISTANT

## 2025-04-15 NOTE — PATIENT INSTRUCTIONS
Consider a CPAP chinstrap or mouth tape to help keep your mouth closed and reduce your air leakage overall

## 2025-05-05 RX ORDER — LOSARTAN POTASSIUM 100 MG/1
100 TABLET ORAL NIGHTLY
Qty: 90 TABLET | Refills: 1 | Status: SHIPPED | OUTPATIENT
Start: 2025-05-05

## 2025-05-05 NOTE — TELEPHONE ENCOUNTER
Protocol passed     LOV: 7/15/24   RTC: 6 months  Filled: 5/8/24 #90 3 refill   Labs: 7/6/24   Future Appointments   Date Time Provider Department Center   5/14/2025  8:30 AM Danyell Way APN [facility]   7/16/2025  8:00 AM Hope Yao MD EM[facility] 8 [facility] [place]   7/30/2025  8:20 AM Rhea Morris MD [facility]   9/30/2025  8:40 AM Rhea Morris MD [facility]   11/4/2025  8:30 AM Kasia Ghosh PA-C [facility] Pulm [facility] [place]   12/16/2025  8:40 AM Rhea Morris MD [facility]

## 2025-05-06 ENCOUNTER — PATIENT MESSAGE (OUTPATIENT)
Dept: INTERNAL MEDICINE CLINIC | Facility: CLINIC | Age: 55
End: 2025-05-06

## 2025-05-08 NOTE — TELEPHONE ENCOUNTER
PA entered in CMM today for zepbound 2.5 mg  Initiated by me as there was no request in CMM  Pending approval or denial  Attached current visit and first visit 10/2021    Soheila Morley (Key: [id number])  Zepbound 2.5MG/0.5ML pen-injectors  Form  [employer] (AMPS) Prior Authorization Form   Plan Contact  [phone] phone  [phone] fax

## 2025-05-19 NOTE — TELEPHONE ENCOUNTER
BENITA ANDINO  2025 - 2025  Patient ID: [id number]  : 1970  Age: 54 years  Gender: Female  [place] ([id number])  [place]  Compliance Report  Usage 2025 - 2025  Usage days 27/30 days (90%)  >= 4 hours 26 days (87%)  < 4 hours 1 days (3%)  Usage hours 150 hours 30 minutes  Average usage (total days) 5 hours 1 minutes  Average usage (days used) 5 hours 34 minutes  Median usage (days used) 5 hours 23 minutes  Total used hours (value since last reset - 2025) 1,475 hours  AirSense 11 AutoSet  Serial number [id number]  Mode AutoSet  Min Pressure 10 cmH2O  Max Pressure 20 cmH2O  EPR Ramp Only  EPR level 1  Response Standard  Therapy  Pressure - cmH2O Median: 11.2 95th percentile: 13.3 Maximum: 14.3  Leaks - L/min Median: 3.6 95th percentile: 22.0 Maximum: 32.8  Events per hour AI: 0.7 HI: 0.3 AHI: 1.0  Apnea Index Central: 0.2 Obstructive: 0.4 Unknown: 0.1  RERA Index 0.1  Cheyne-Gonsalez respiration (average duration per night) 0 minutes (0%

## 2025-05-26 NOTE — TELEPHONE ENCOUNTER
Metformin 500 mg  Filled 6-29-22  Qty 90  3 refills  Future Appointments   Date Time Provider Shannan Dubon   11/15/2023  8:20 AM Alana Johnson MD [facility]   LOV 7-12-23 LS  RTC 4 wks   Labs 7-8-23 A1c
Alert and oriented to person, place and time

## 2025-06-02 ENCOUNTER — PATIENT MESSAGE (OUTPATIENT)
Dept: INTERNAL MEDICINE CLINIC | Facility: CLINIC | Age: 55
End: 2025-06-02

## 2025-06-02 DIAGNOSIS — E53.8 B12 DEFICIENCY: Primary | ICD-10-CM

## 2025-06-03 RX ORDER — CYANOCOBALAMIN 500 UG/1
1 SPRAY, METERED NASAL WEEKLY
Qty: 4 EACH | Refills: 5 | Status: SHIPPED | OUTPATIENT
Start: 2025-06-03

## 2025-06-03 NOTE — TELEPHONE ENCOUNTER
[facility].  PA in CMM still pending - 14 days since sent online  Called Insurance Company - Jo at 7 am and they are closed.  Must try later    Soheila Morley (Key: [id number])  Zepbound 2.5MG/0.5ML pen-injectors  Form  [employer] (AMPS) Prior Authorization Form   Plan Contact  [phone] phone  [phone] fax

## 2025-06-03 NOTE — TELEPHONE ENCOUNTER
Call placed to [facility]- spoke with Gerber to check status of PA Zepbound 2.5mg sent 14 days ago.  Was transferred to \"dedicated team\" that manages patient's policy (phone# [phone]).  Spoke with Sammi- she confirmed patient does not any AOM coverage and Zepbound is excluded from plan- not covered for any reason.  Plan only covers GLP-1's for Type 2 DM with A1c greater than 7 and also failed two other oral medications.     NS- please advise on any further recommendations    LOV: 4/10/25   Assessment & Plan  Obesity  Emotional eating due to stress and mental exhaustion. Phentermine ineffective. Discussed Zepbound for weight loss and sleep apnea. Potential side effects include nausea and diarrhea. Insurance coverage pursued for sleep apnea indication.  - Recheck labs: A1c, thyroid, cholesterol, vitamin levels.  - Pursue insurance coverage for Zepbound.  - Discuss Zepbound side effects: nausea, diarrhea.  - Encourage therapy for emotional support, confirm insurance coverage.

## 2025-06-03 NOTE — TELEPHONE ENCOUNTER
Option A : zepbound vial program   Option B: bariatric surgery considerations     In regards to b12 can do b12 injections monthly instead

## 2025-06-10 DIAGNOSIS — G47.33 OSA (OBSTRUCTIVE SLEEP APNEA): ICD-10-CM

## 2025-06-10 DIAGNOSIS — I10 ESSENTIAL HYPERTENSION: ICD-10-CM

## 2025-06-10 DIAGNOSIS — Z51.81 THERAPEUTIC DRUG MONITORING: ICD-10-CM

## 2025-06-10 DIAGNOSIS — E66.01 MORBID OBESITY (HCC): ICD-10-CM

## 2025-06-10 DIAGNOSIS — E11.9 DIET-CONTROLLED DIABETES MELLITUS (HCC): ICD-10-CM

## 2025-06-10 RX ORDER — PHENTERMINE HYDROCHLORIDE 30 MG/1
30 CAPSULE ORAL EVERY MORNING
Qty: 30 CAPSULE | Refills: 0 | Status: SHIPPED | OUTPATIENT
Start: 2025-06-10

## 2025-06-10 NOTE — TELEPHONE ENCOUNTER
Requesting   Requested Prescriptions     Pending Prescriptions Disp Refills    PHENTERMINE HCL 30 MG Oral Cap [Pharmacy Med Name: Phentermine HCl Oral Capsule 30 MG] 30 capsule 0     Sig: TAKE 1 CAPSULE BY MOUTH IN THE MORNING      LOV: 04/14/25  RTC: 07/30/25  Filled: 02/17/25 #30 with 2 refills    Future Appointments   Date Time Provider Department Center   7/16/2025  8:00 AM Hope Yao MD [facility] 8 [facility] [place]   7/30/2025  8:20 AM Rhea Morris MD [facility]   9/30/2025  8:40 AM Rhea Morris MD [facility]   11/4/2025  8:30 AM Kasia Ghosh PA-C [facility]m [facility] [place]   12/16/2025  8:40 AM Rhea Morris MD [facility]

## 2025-06-11 RX ORDER — AMLODIPINE BESYLATE 10 MG/1
10 TABLET ORAL NIGHTLY
Qty: 90 TABLET | Refills: 3 | Status: SHIPPED | OUTPATIENT
Start: 2025-06-11

## 2025-06-11 RX ORDER — LEVOTHYROXINE SODIUM 100 UG/1
100 TABLET ORAL DAILY
Qty: 90 TABLET | Refills: 3 | Status: SHIPPED | OUTPATIENT
Start: 2025-06-11

## 2025-06-11 NOTE — TELEPHONE ENCOUNTER
Protocol passed     LOV: 7/15/24   RTC: 6 months  Filled 3/12/25 #90 0 refills   Labs: 4/10/25   Future Appointments   Date Time Provider Department Center   7/16/2025  8:00 AM Hope Yao MD [facility] 8 [facility] [place]   7/30/2025  8:20 AM Rhea Morris MD [facility]   9/30/2025  8:40 AM Rhea Morirs MD [facility]   11/4/2025  8:30 AM Kasia Ghosh PA-C [facility] Pul[facility] [place]   12/16/2025  8:40 AM Rhea Morris MD [facility]

## 2025-06-16 ENCOUNTER — NURSE ONLY (OUTPATIENT)
Dept: INTERNAL MEDICINE CLINIC | Facility: CLINIC | Age: 55
End: 2025-06-16
Payer: COMMERCIAL

## 2025-06-16 DIAGNOSIS — E53.8 B12 DEFICIENCY: Primary | ICD-10-CM

## 2025-06-16 RX ORDER — CYANOCOBALAMIN 1000 UG/ML
1000 INJECTION, SOLUTION INTRAMUSCULAR; SUBCUTANEOUS ONCE
Status: COMPLETED | OUTPATIENT
Start: 2025-06-16 | End: 2025-06-16

## 2025-06-16 RX ADMIN — CYANOCOBALAMIN 1000 MCG: 1000 INJECTION, SOLUTION INTRAMUSCULAR; SUBCUTANEOUS at 13:44:00

## 2025-07-09 DIAGNOSIS — I10 ESSENTIAL HYPERTENSION: ICD-10-CM

## 2025-07-09 DIAGNOSIS — E66.01 MORBID OBESITY (HCC): ICD-10-CM

## 2025-07-09 DIAGNOSIS — E11.9 DIET-CONTROLLED DIABETES MELLITUS (HCC): ICD-10-CM

## 2025-07-09 DIAGNOSIS — Z51.81 THERAPEUTIC DRUG MONITORING: ICD-10-CM

## 2025-07-09 DIAGNOSIS — G47.33 OSA (OBSTRUCTIVE SLEEP APNEA): ICD-10-CM

## 2025-07-10 ENCOUNTER — PATIENT MESSAGE (OUTPATIENT)
Dept: INTERNAL MEDICINE CLINIC | Facility: CLINIC | Age: 55
End: 2025-07-10

## 2025-07-10 DIAGNOSIS — Z00.00 LABORATORY EXAM ORDERED AS PART OF ROUTINE GENERAL MEDICAL EXAMINATION: Primary | ICD-10-CM

## 2025-07-10 NOTE — TELEPHONE ENCOUNTER
Dr. Yao- See [employer] message. Are you wanting labs prior to 7/16 appointment? Please advise. TY

## 2025-07-12 ENCOUNTER — LAB ENCOUNTER (OUTPATIENT)
Dept: LAB | Age: 55
End: 2025-07-12
Attending: INTERNAL MEDICINE
Payer: COMMERCIAL

## 2025-07-12 DIAGNOSIS — Z00.00 LABORATORY EXAM ORDERED AS PART OF ROUTINE GENERAL MEDICAL EXAMINATION: ICD-10-CM

## 2025-07-12 LAB
ALBUMIN SERPL-MCNC: 4.5 G/DL (ref 3.2–4.8)
ALBUMIN/GLOB SERPL: 1.6 {RATIO} (ref 1–2)
ALP LIVER SERPL-CCNC: 81 U/L (ref 41–108)
ALT SERPL-CCNC: 72 U/L (ref 10–49)
ANION GAP SERPL CALC-SCNC: 10 MMOL/L (ref 0–18)
AST SERPL-CCNC: 61 U/L (ref ?–34)
BASOPHILS # BLD AUTO: 0.08 X10(3) UL (ref 0–0.2)
BASOPHILS NFR BLD AUTO: 0.8 %
BILIRUB SERPL-MCNC: 0.4 MG/DL (ref 0.3–1.2)
BUN BLD-MCNC: 12 MG/DL (ref 9–23)
CALCIUM BLD-MCNC: 9.4 MG/DL (ref 8.7–10.6)
CHLORIDE SERPL-SCNC: 105 MMOL/L (ref 98–112)
CO2 SERPL-SCNC: 26 MMOL/L (ref 21–32)
CREAT BLD-MCNC: 0.99 MG/DL (ref 0.55–1.02)
EGFRCR SERPLBLD CKD-EPI 2021: 68 ML/MIN/1.73M2 (ref 60–?)
EOSINOPHIL # BLD AUTO: 0.22 X10(3) UL (ref 0–0.7)
EOSINOPHIL NFR BLD AUTO: 2.3 %
ERYTHROCYTE [DISTWIDTH] IN BLOOD BY AUTOMATED COUNT: 13 %
EST. AVERAGE GLUCOSE BLD GHB EST-MCNC: 134 MG/DL (ref 68–126)
FASTING STATUS PATIENT QL REPORTED: YES
GLOBULIN PLAS-MCNC: 2.9 G/DL (ref 2–3.5)
GLUCOSE BLD-MCNC: 154 MG/DL (ref 70–99)
HBA1C MFR BLD: 6.3 % (ref ?–5.7)
HCT VFR BLD AUTO: 41.8 % (ref 35–48)
HGB BLD-MCNC: 13.7 G/DL (ref 12–16)
IMM GRANULOCYTES # BLD AUTO: 0.03 X10(3) UL (ref 0–1)
IMM GRANULOCYTES NFR BLD: 0.3 %
LYMPHOCYTES # BLD AUTO: 2.54 X10(3) UL (ref 1–4)
LYMPHOCYTES NFR BLD AUTO: 26.7 %
MCH RBC QN AUTO: 29.7 PG (ref 26–34)
MCHC RBC AUTO-ENTMCNC: 32.8 G/DL (ref 31–37)
MCV RBC AUTO: 90.5 FL (ref 80–100)
MONOCYTES # BLD AUTO: 0.64 X10(3) UL (ref 0.1–1)
MONOCYTES NFR BLD AUTO: 6.7 %
NEUTROPHILS # BLD AUTO: 6.01 X10 (3) UL (ref 1.5–7.7)
NEUTROPHILS # BLD AUTO: 6.01 X10(3) UL (ref 1.5–7.7)
NEUTROPHILS NFR BLD AUTO: 63.2 %
OSMOLALITY SERPL CALC.SUM OF ELEC: 295 MOSM/KG (ref 275–295)
PLATELET # BLD AUTO: 267 10(3)UL (ref 150–450)
POTASSIUM SERPL-SCNC: 4.4 MMOL/L (ref 3.5–5.1)
PROT SERPL-MCNC: 7.4 G/DL (ref 5.7–8.2)
RBC # BLD AUTO: 4.62 X10(6)UL (ref 3.8–5.3)
SODIUM SERPL-SCNC: 141 MMOL/L (ref 136–145)
WBC # BLD AUTO: 9.5 X10(3) UL (ref 4–11)

## 2025-07-12 PROCEDURE — 36415 COLL VENOUS BLD VENIPUNCTURE: CPT

## 2025-07-12 PROCEDURE — 83036 HEMOGLOBIN GLYCOSYLATED A1C: CPT

## 2025-07-12 PROCEDURE — 80053 COMPREHEN METABOLIC PANEL: CPT

## 2025-07-12 PROCEDURE — 85025 COMPLETE CBC W/AUTO DIFF WBC: CPT

## 2025-07-14 ENCOUNTER — NURSE ONLY (OUTPATIENT)
Dept: INTERNAL MEDICINE CLINIC | Facility: CLINIC | Age: 55
End: 2025-07-14
Payer: COMMERCIAL

## 2025-07-14 DIAGNOSIS — E53.8 B12 DEFICIENCY: Primary | ICD-10-CM

## 2025-07-14 RX ORDER — PHENTERMINE HYDROCHLORIDE 30 MG/1
30 CAPSULE ORAL EVERY MORNING
Qty: 30 CAPSULE | Refills: 0 | Status: SHIPPED | OUTPATIENT
Start: 2025-07-14

## 2025-07-14 RX ORDER — CYANOCOBALAMIN 1000 UG/ML
1000 INJECTION, SOLUTION INTRAMUSCULAR; SUBCUTANEOUS ONCE
Status: COMPLETED | OUTPATIENT
Start: 2025-07-14 | End: 2025-07-14

## 2025-07-14 RX ADMIN — CYANOCOBALAMIN 1000 MCG: 1000 INJECTION, SOLUTION INTRAMUSCULAR; SUBCUTANEOUS at 13:20:00

## 2025-07-14 NOTE — TELEPHONE ENCOUNTER
Requesting   Requested Prescriptions     Pending Prescriptions Disp Refills    PHENTERMINE HCL 30 MG Oral Cap [Pharmacy Med Name: Phentermine HCl Oral Capsule 30 MG] 30 capsule 0     Sig: TAKE 1 CAPSULE BY MOUTH IN THE MORNING      LOV: 04/10/25  RTC: 07/30/25  Filled: 06/10/25 #30 with 0 refills    Future Appointments   Date Time Provider Department Center   7/14/2025  1:00 PM EM[facility] NURSE [facility]   7/16/2025  8:00 AM Hope Yao MD EM[facility] 8 [facility] [place]   7/30/2025  8:20 AM Rhea Morris MD [facility]   8/11/2025  1:00 PM [facility] NURSE [facility]   9/8/2025  1:00 PM [facility]   9/30/2025  8:40 AM Rhea Morris MD [facility]   10/6/2025  1:00 PM EM[facility] NURSE [facility]   11/3/2025  1:00 PM EMG [facility] NURSE [facility]   11/4/2025  8:30 AM Kasia Ghosh PA-C [facility] Pulm [facility] [place]   12/16/2025  8:40 AM Rhea Morris MD [facility]

## 2025-07-16 ENCOUNTER — OFFICE VISIT (OUTPATIENT)
Dept: INTERNAL MEDICINE CLINIC | Facility: CLINIC | Age: 55
End: 2025-07-16
Payer: COMMERCIAL

## 2025-07-16 VITALS
RESPIRATION RATE: 16 BRPM | TEMPERATURE: 98 F | HEART RATE: 82 BPM | DIASTOLIC BLOOD PRESSURE: 78 MMHG | WEIGHT: 287.19 LBS | OXYGEN SATURATION: 99 % | SYSTOLIC BLOOD PRESSURE: 126 MMHG | BODY MASS INDEX: 47.85 KG/M2 | HEIGHT: 65 IN

## 2025-07-16 DIAGNOSIS — R74.8 ELEVATED LIVER ENZYMES: ICD-10-CM

## 2025-07-16 DIAGNOSIS — Z00.00 ENCOUNTER FOR ROUTINE ADULT MEDICAL EXAMINATION: Primary | ICD-10-CM

## 2025-07-16 DIAGNOSIS — I10 ESSENTIAL HYPERTENSION: ICD-10-CM

## 2025-07-16 DIAGNOSIS — Z23 NEED FOR VACCINATION FOR STREP PNEUMONIAE: ICD-10-CM

## 2025-07-16 DIAGNOSIS — M54.42 CHRONIC BILATERAL LOW BACK PAIN WITH LEFT-SIDED SCIATICA: ICD-10-CM

## 2025-07-16 DIAGNOSIS — Z12.31 SCREENING MAMMOGRAM FOR BREAST CANCER: ICD-10-CM

## 2025-07-16 DIAGNOSIS — E11.9 DIET-CONTROLLED DIABETES MELLITUS (HCC): ICD-10-CM

## 2025-07-16 DIAGNOSIS — G89.29 CHRONIC BILATERAL LOW BACK PAIN WITH LEFT-SIDED SCIATICA: ICD-10-CM

## 2025-07-16 DIAGNOSIS — Z12.11 SCREENING FOR COLON CANCER: ICD-10-CM

## 2025-07-16 DIAGNOSIS — E03.9 ACQUIRED HYPOTHYROIDISM: ICD-10-CM

## 2025-07-16 DIAGNOSIS — F41.9 ANXIETY: ICD-10-CM

## 2025-07-16 PROCEDURE — 99396 PREV VISIT EST AGE 40-64: CPT | Performed by: INTERNAL MEDICINE

## 2025-07-16 PROCEDURE — 90471 IMMUNIZATION ADMIN: CPT | Performed by: INTERNAL MEDICINE

## 2025-07-16 PROCEDURE — 99213 OFFICE O/P EST LOW 20 MIN: CPT | Performed by: INTERNAL MEDICINE

## 2025-07-16 PROCEDURE — 90677 PCV20 VACCINE IM: CPT | Performed by: INTERNAL MEDICINE

## 2025-07-16 RX ORDER — FLUTICASONE PROPIONATE 50 MCG
2 SPRAY, SUSPENSION (ML) NASAL DAILY
Qty: 16 G | Refills: 2 | Status: SHIPPED | OUTPATIENT
Start: 2025-07-16

## 2025-07-16 NOTE — PATIENT INSTRUCTIONS
Repeat blood work for liver enzymes in 1-2 months (no fasting needed)     If results are still elevated, plan for follow up with Dr. Guillen[place] [address]  [phone]    Please call to scheduled an appointment with gastroenterology to schedule colonoscopy     Consider Shingrix vaccine - can get here with nurse visit appointment or at pharmacy     Flonase/fluticasone 2 sprays each nostril once a day

## 2025-07-16 NOTE — PROGRESS NOTES
[facility] Internal Medicine Office Note  Chief Complaint:   Chief Complaint   Patient presents with    Physical       HPI:   This is a 54 year old female coming in for annual physical   HPI       The following individual(s) verbally consented to be recorded using ambient AI listening technology and understand that they can each withdraw their consent to this listening technology at any point by asking the clinician to turn off or pause the recording:    Patient name: Soheila Morley       She had cash pay testing done for blood work through a company.     Diet controlled DM - A1c 6.3    Back pain - seeing a chiropractor     Due for Shingrix and Prevnar 20     Mood has been doing well   On escitalopram 10mg daily       History of Present Illness  Soheila Morley is a 54 year old female with chronic back pain and diabetes who presents for follow-up and medication management.    She experiences chronic back pain with a recent exacerbation affecting the lower back and more pronounced sciatica on the left side. An acute episode two weeks ago resulted in immobility, leading to a [facility] consultation and a prescription for diclofenac. She is following with chiropractor several times a week     Her diabetes is managed with a stable A1c of 6.3.     Liver enzymes, AST and ALT, are elevated at 61 and 72, respectively. Her last blood work prior to this one showed normal levels. She has a history of fatty liver and underwent an abdominal ultrasound in June 2021. She had followed with Dr. Dale. Alcohol consumption is rare.    She is on escitalopram, experiencing mood fluctuations, particularly at work, but mood is manageable outside of work. Occasional swelling occurs, especially in heat. She denies chest pain, breathing concerns, or digestive issues. Allergy symptoms, such as tearing and sneezing, occur occasionally and resolve within a day.       Problem List[1]  Past Surgical History[2]  Family History[3]      I reviewed her's Past Medical History, Past Surgical History, Family History and   Social History updated shows  Short Social Hx on File[4]  Allergies:  Allergies[5]  Current Medications[6]      REVIEW OF SYSTEMS:   Review of Systems   Constitutional:  Negative for fever.   HENT:  Negative for congestion.    Eyes:  Negative for visual disturbance.   Respiratory:  Negative for shortness of breath.    Cardiovascular:  Negative for chest pain.   Gastrointestinal:  Negative for constipation.   Genitourinary:  Negative for dysuria.   Neurological: Negative.    Hematological: Negative.    Psychiatric/Behavioral: Negative.          EXAM:   /78   Pulse 82   Temp 98 °F (36.7 °C) (Temporal)   Resp 16   Ht 5' 5\" (1.651 m)   Wt 287 lb 3.2 oz (130.3 kg)   SpO2 99%   BMI 47.79 kg/m²  Estimated body mass index is 47.79 kg/m² as calculated from the following:    Height as of this encounter: 5' 5\" (1.651 m).    Weight as of this encounter: 287 lb 3.2 oz (130.3 kg).   Vital signs reviewed. Appears stated age, well groomed.  Physical Exam  Vitals reviewed.   Constitutional:       General: She is not in acute distress.     Appearance: She is well-developed.   HENT:      Head: Normocephalic and atraumatic.      Right Ear: Tympanic membrane normal.      Left Ear: Tympanic membrane normal.   Eyes:      Conjunctiva/sclera: Conjunctivae normal.   Cardiovascular:      Rate and Rhythm: Normal rate and regular rhythm.      Heart sounds: Normal heart sounds.   Pulmonary:      Effort: Pulmonary effort is normal.      Breath sounds: Normal breath sounds.   Musculoskeletal:      Cervical back: Neck supple.      Right lower leg: No edema.      Left lower leg: No edema.   Lymphadenopathy:      Cervical: No cervical adenopathy.   Skin:     General: Skin is warm and dry.   Neurological:      General: No focal deficit present.      Mental Status: She is alert.   Psychiatric:         Mood and Affect: Mood normal.          ASSESSMENT AND  PLAN:   Soheila Morley is a 54 year old female with  1. Encounter for routine adult medical examination    2. Screening mammogram for breast cancer    3. Chronic bilateral low back pain with left-sided sciatica    4. Essential hypertension    5. Acquired hypothyroidism    6. Diet-controlled diabetes mellitus (HCC)    7. Elevated liver enzymes    8. Screening for colon cancer    9. Need for vaccination for Strep pneumoniae    10. Anxiety          The plan is as follows  Soheila was seen today for physical.    Diagnoses and all orders for this visit:    Encounter for routine adult medical examination  -due for colonoscopy   -due for mammo  -Shingrix recommended  -Prevnar 20 recommended     Screening mammogram for breast cancer  -     [facility] ROB 2D+3D SCREENING BILAT (CPT=77067/13960); Future    Chronic bilateral low back pain with left-sided sciatica    Essential hypertension -at goal; cont present management     Acquired hypothyroidism    Diet-controlled diabetes mellitus (HCC)    Elevated liver enzymes -as below   -     Hepatology Referral - External  -     Hepatic Function Panel (7) [E]; Future    Screening for colon cancer  -     Gastro Referral - In Network    Need for vaccination for Strep pneumoniae  -     Prevnar 20 (PCV20) [21421]    Anxiety    Other orders  -     diclofenac 50 MG Oral Tab EC; Take 1 tablet (50 mg total) by mouth 3 (three) times daily as needed.  -     fluticasone propionate 50 MCG/ACT Nasal Suspension; 2 sprays by Each Nare route daily.    Discussed statin for decreasing LDL and decreasing risk for heart attack and stroke. She will think about it for now     Assessment & Plan  Low Back Pain with Sciatica  Chronic low back pain with recent exacerbation and left-sided sciatica. Increased chiropractic care frequency. Diclofenac prescribed for acute management.  - Refill diclofenac prescription at [facility] [place] [facility].  - Continue chiropractic care as needed.    Elevated Liver Enzymes  Elevated AST  and ALT likely due to non-alcoholic fatty liver disease. Discussed risk of progression to fibrosis or cirrhosis.  - Avoid alcohol consumption.  - Repeat liver enzyme blood work in 1-2 months.  - Consider referral to liver specialist Dr. Ravin Willson if liver enzymes remain elevated.  - Consider fibroscan ultrasound if liver enzymes remain elevated.    DM:   A1c at 6.3% , well controlled     Hyperlipidemia  LDL cholesterol slightly above target for diabetes. Discussed statin therapy risks and benefits. She prefers to defer statin due to muscle pain concerns.  - Recommended starting a low-dose statin in the future to reduce LDL cholesterol.    Allergic Rhinitis  Intermittent symptoms suggestive of allergic rhinitis. Discussed Flonase and alternative antihistamines.  - Prescribe Flonase (fluticasone) nasal spray, 2 sprays each nostril once a day.  - Consider using saline nasal spray if nasal dryness occurs.    General Health Maintenance  Due for Shingrix and Prevnar 20 vaccinations. Discussed potential side effects of Shingrix.  - Recommend Shingrix vaccine.  - Administer Prevnar 20 vaccine today    Follow-up  Colonoscopy due - at 5 year follow up. Insurance did not cover a 3 year follow up, patient noted. Mammogram appointment is scheduled.  - Schedule repeat liver enzyme blood work in 1-2 months.  - Schedule colonoscopy with [facility]        Orders Placed This Encounter   Procedures    Hepatic Function Panel (7) [E]    Prevnar 20 (PCV20) [63956]       Meds & Refills for this Visit:  Requested Prescriptions     Signed Prescriptions Disp Refills    diclofenac 50 MG Oral Tab EC 90 tablet 2     Sig: Take 1 tablet (50 mg total) by mouth 3 (three) times daily as needed.    fluticasone propionate 50 MCG/ACT Nasal Suspension 16 g 2     Si sprays by Each Nare route daily.       Imaging & Consults:  HEPATOLOGY - EXTERNAL  GASTRO - INTERNAL  PCV20 VACCINE FOR INTRAMUSCULAR USE  [facility] ROB 2D+3D SCREENING BILAT  (CPT=77067/07387)    Health Maintenance Due   Topic Date Due    Statin Use in Persons with Diabetes  Never done    Pneumococcal Vaccine: 50+ Years (1 of 2 - PCV) Never done    Zoster Vaccines (1 of 2) Never done    Colorectal Cancer Screening  06/02/2023    COVID-19 Vaccine (4 - 2024-25 season) 09/01/2024    Annual Physical  07/15/2025    Mammogram  10/03/2025     Patient/Caregiver Education: Patient/Caregiver Education: There are no barriers to learning. Medical education done. Outcome: Patient verbalizes understanding. Patient is notified to call with any questions, complications, allergies, or worsening or changing symptoms.  Patient is to call with any side effects or complications from the treatments as a result of today.     Hope Yao MD        [1]   Patient Active Problem List  Diagnosis    Essential hypertension    Anxiety and depression    Acquired hypothyroidism    History of hysterectomy, supracervical    Umbilical hernia    Ventral hernia    Ventral hernia without obstruction or gangrene    Class 3 severe obesity due to excess calories with serious comorbidity and body mass index (BMI) of 45.0 to 49.9 in adult    Hypertriglyceridemia    Elevated fasting glucose    External hemorrhoid    Anal polyp    KEISHA (obstructive sleep apnea)- moderate, AHI 20.5    Daytime hypersomnolence    Diet-controlled diabetes mellitus (HCC)    Therapeutic drug monitoring   [2]   Past Surgical History:  Procedure Laterality Date    Carpal tunnel release Bilateral     Colonoscopy      X2    Colonoscopy      D & c      Dilation/curettage,diagnostic      Hernia surgery  09/22/2017    Double hernia, umbilical and ventral    Hysterectomy  10/2013    Ovaries Remain    Other surgical history  02/13/2020    Anal Exam Under Anesthesia Transanal Excision of Anal Canal Polyp    Tonsillectomy     [3]   Family History  Problem Relation Age of Onset    Heart Disorder Father     Stroke Father     Other (COPD) Father     Heart Disease  Father         Heart Attack    Diabetes Mother         Diet Controlled    Hypertension Mother     Thyroid Disorder Mother     Cancer Mother         Lung/Skin/Thyroid    Lung Disorder Mother         Turmor on lung    Heart Disorder Mother         Dec 27, 2017    Heart Disease Mother         Heart Attack    Cancer Maternal Grandmother         Liver    Heart Disorder Maternal Grandfather     Stroke Maternal Grandfather     Diabetes Paternal Grandmother     Heart Attack Paternal Grandmother     Heart Disorder Paternal Grandmother     Stroke Paternal Grandmother     Stroke Paternal Grandfather     Other (Brain Tumor) Paternal Grandfather     Infectious Disease Sister     Other (Prothrombin Gene Mutation) Sister     Other (Blood Clot) Sister     Obesity Sister     Pulmonary Disease Sister         Prothrombin Gene Mutation    Other (Prothrombin Gene Mutation) Sister     Pulmonary Disease Sister         Prothrombin Gene Mutation   [4]   Social History  Socioeconomic History    Marital status:    Occupational History    Occupation:      Employer: [employer]     Comment: [employer]    Occupation: [employer]   Tobacco Use    Smoking status: Never     Passive exposure: Past (Father smoked in home when pt was a child)    Smokeless tobacco: Never   Vaping Use    Vaping status: Never Used   Substance and Sexual Activity    Alcohol use: Yes     Comment: very rare    Drug use: No   Other Topics Concern    Caffeine Concern Yes    Exercise Yes    Seat Belt Yes    Special Diet No    Stress Concern Yes    Weight Concern Yes     Social Drivers of Health     Food Insecurity: No Food Insecurity (7/16/2025)    NCSS - Food Insecurity     Worried About Running Out of Food in the Last Year: No     Ran Out of Food in the Last Year: No   Transportation Needs: No Transportation Needs (7/16/2025)    NCSS - Transportation     Lack of Transportation: No   Stress: Stress Concern Present (12/11/2023)     Stress     Feeling of Stress : Yes   Housing Stability: Not At Risk (7/16/2025)    NCSS - Housing/Utilities     Has Housing: Yes     Worried About Losing Housing: No     Unable to Get Utilities: No   [5]   Allergies  Allergen Reactions    Adhesive Tape RASH   [6]   Current Outpatient Medications   Medication Sig Dispense Refill    diclofenac 50 MG Oral Tab EC Take 1 tablet (50 mg total) by mouth 3 (three) times daily as needed. 90 tablet 2    fluticasone propionate 50 MCG/ACT Nasal Suspension 2 sprays by Each Nare route daily. 16 g 2    PHENTERMINE HCL 30 MG Oral Cap TAKE 1 CAPSULE BY MOUTH IN THE MORNING 30 capsule 0    amLODIPine 10 MG Oral Tab TAKE 1 TABLET BY MOUTH AT NIGHT 90 tablet 3    levothyroxine 100 MCG Oral Tab TAKE 1 TABLET BY MOUTH EVERY DAY 90 tablet 3    losartan 100 MG Oral Tab TAKE 1 TABLET BY MOUTH AT NIGHT 90 tablet 1    ALPRAZolam 0.25 MG Oral Tab Take 1 tablet (0.25 mg total) by mouth 2 (two) times daily as needed for Anxiety. 20 tablet 1    escitalopram 10 MG Oral Tab TAKE 1 TABLET BY MOUTH EVERY DAY 90 tablet 3    metFORMIN 500 MG Oral Tab TAKE 1 TABLET BY MOUTH EVERY DAY WITH BREAKFAST 90 tablet 3    Cholecalciferol (VITAMIN D) 50 MCG (2000 UT) Oral Cap Take by mouth.

## 2025-07-30 ENCOUNTER — OFFICE VISIT (OUTPATIENT)
Dept: INTERNAL MEDICINE CLINIC | Facility: CLINIC | Age: 55
End: 2025-07-30
Payer: COMMERCIAL

## 2025-07-30 VITALS
WEIGHT: 286 LBS | DIASTOLIC BLOOD PRESSURE: 80 MMHG | RESPIRATION RATE: 18 BRPM | HEIGHT: 66 IN | HEART RATE: 89 BPM | BODY MASS INDEX: 45.96 KG/M2 | SYSTOLIC BLOOD PRESSURE: 118 MMHG

## 2025-07-30 DIAGNOSIS — G47.33 OSA (OBSTRUCTIVE SLEEP APNEA): ICD-10-CM

## 2025-07-30 DIAGNOSIS — E66.01 MORBID OBESITY (HCC): ICD-10-CM

## 2025-07-30 DIAGNOSIS — Z51.81 THERAPEUTIC DRUG MONITORING: Primary | ICD-10-CM

## 2025-07-30 DIAGNOSIS — E11.9 DIET-CONTROLLED DIABETES MELLITUS (HCC): ICD-10-CM

## 2025-07-30 DIAGNOSIS — I10 ESSENTIAL HYPERTENSION: ICD-10-CM

## 2025-07-30 DIAGNOSIS — E53.8 B12 DEFICIENCY: ICD-10-CM

## 2025-07-30 PROCEDURE — 99214 OFFICE O/P EST MOD 30 MIN: CPT | Performed by: INTERNAL MEDICINE

## 2025-07-30 RX ORDER — PHENTERMINE HYDROCHLORIDE 15 MG/1
15 CAPSULE ORAL EVERY MORNING
Qty: 30 CAPSULE | Refills: 2 | Status: SHIPPED | OUTPATIENT
Start: 2025-07-30

## 2025-08-11 ENCOUNTER — NURSE ONLY (OUTPATIENT)
Dept: INTERNAL MEDICINE CLINIC | Facility: CLINIC | Age: 55
End: 2025-08-11

## 2025-08-11 RX ADMIN — CYANOCOBALAMIN 1000 MCG: 1000 INJECTION, SOLUTION INTRAMUSCULAR; SUBCUTANEOUS at 08:47:00

## 2025-08-15 ENCOUNTER — LABORATORY ENCOUNTER (OUTPATIENT)
Dept: LAB | Age: 55
End: 2025-08-15
Attending: STUDENT IN AN ORGANIZED HEALTH CARE EDUCATION/TRAINING PROGRAM

## 2025-08-15 DIAGNOSIS — Z01.818 PRE-OP TESTING: ICD-10-CM

## 2025-08-15 DIAGNOSIS — R74.8 ELEVATED LIVER ENZYMES: ICD-10-CM

## 2025-08-15 LAB
ALBUMIN SERPL-MCNC: 4.6 G/DL (ref 3.2–4.8)
ALP LIVER SERPL-CCNC: 83 U/L (ref 41–108)
ALT SERPL-CCNC: 60 U/L (ref 10–49)
AST SERPL-CCNC: 52 U/L (ref ?–34)
BILIRUB DIRECT SERPL-MCNC: 0.1 MG/DL (ref ?–0.3)
BILIRUB SERPL-MCNC: 0.4 MG/DL (ref 0.3–1.2)
CHLORIDE SERPL-SCNC: 105 MMOL/L (ref 98–112)
CO2 SERPL-SCNC: 25 MMOL/L (ref 21–32)
POTASSIUM SERPL-SCNC: 4.6 MMOL/L (ref 3.5–5.1)
PROT SERPL-MCNC: 7.6 G/DL (ref 5.7–8.2)
SODIUM SERPL-SCNC: 141 MMOL/L (ref 136–145)

## 2025-08-15 PROCEDURE — 36415 COLL VENOUS BLD VENIPUNCTURE: CPT

## 2025-08-15 PROCEDURE — 80051 ELECTROLYTE PANEL: CPT

## 2025-08-15 PROCEDURE — 80076 HEPATIC FUNCTION PANEL: CPT

## 2025-08-18 ENCOUNTER — RESULTS FOLLOW-UP (OUTPATIENT)
Dept: INTERNAL MEDICINE CLINIC | Facility: CLINIC | Age: 55
End: 2025-08-18

## 2025-08-18 DIAGNOSIS — R74.8 ELEVATED LIVER ENZYMES: Primary | ICD-10-CM

## 2025-08-20 ENCOUNTER — ANESTHESIA (OUTPATIENT)
Dept: ENDOSCOPY | Facility: HOSPITAL | Age: 55
End: 2025-08-20

## 2025-08-20 ENCOUNTER — ANESTHESIA EVENT (OUTPATIENT)
Dept: ENDOSCOPY | Facility: HOSPITAL | Age: 55
End: 2025-08-20

## 2025-08-20 ENCOUNTER — HOSPITAL ENCOUNTER (OUTPATIENT)
Facility: HOSPITAL | Age: 55
Setting detail: HOSPITAL OUTPATIENT SURGERY
Discharge: HOME | End: 2025-08-20
Attending: STUDENT IN AN ORGANIZED HEALTH CARE EDUCATION/TRAINING PROGRAM | Admitting: STUDENT IN AN ORGANIZED HEALTH CARE EDUCATION/TRAINING PROGRAM

## 2025-08-20 VITALS
TEMPERATURE: 97 F | HEART RATE: 58 BPM | BODY MASS INDEX: 45.96 KG/M2 | DIASTOLIC BLOOD PRESSURE: 70 MMHG | SYSTOLIC BLOOD PRESSURE: 121 MMHG | OXYGEN SATURATION: 100 % | HEIGHT: 66 IN | RESPIRATION RATE: 20 BRPM | WEIGHT: 286 LBS

## 2025-08-20 DIAGNOSIS — Z01.818 PRE-OP TESTING: Primary | ICD-10-CM

## 2025-08-20 DIAGNOSIS — Z86.0100 HX OF COLONIC POLYPS: ICD-10-CM

## 2025-08-20 PROCEDURE — 88305 TISSUE EXAM BY PATHOLOGIST: CPT | Performed by: STUDENT IN AN ORGANIZED HEALTH CARE EDUCATION/TRAINING PROGRAM

## 2025-08-20 RX ORDER — SODIUM CHLORIDE, SODIUM LACTATE, POTASSIUM CHLORIDE, CALCIUM CHLORIDE 600; 310; 30; 20 MG/100ML; MG/100ML; MG/100ML; MG/100ML
INJECTION, SOLUTION INTRAVENOUS CONTINUOUS
Status: DISCONTINUED | OUTPATIENT
Start: 2025-08-20 | End: 2025-08-20

## 2025-08-20 RX ORDER — NALOXONE HYDROCHLORIDE 0.4 MG/ML
0.08 INJECTION, SOLUTION INTRAMUSCULAR; INTRAVENOUS; SUBCUTANEOUS ONCE AS NEEDED
Status: DISCONTINUED | OUTPATIENT
Start: 2025-08-20 | End: 2025-08-20

## 2025-08-20 RX ORDER — LIDOCAINE HYDROCHLORIDE 10 MG/ML
INJECTION, SOLUTION EPIDURAL; INFILTRATION; INTRACAUDAL; PERINEURAL AS NEEDED
Status: DISCONTINUED | OUTPATIENT
Start: 2025-08-20 | End: 2025-08-20 | Stop reason: SURG

## 2025-08-20 RX ADMIN — SODIUM CHLORIDE, SODIUM LACTATE, POTASSIUM CHLORIDE, CALCIUM CHLORIDE: 600; 310; 30; 20 INJECTION, SOLUTION INTRAVENOUS at 12:33:00

## 2025-08-20 RX ADMIN — SODIUM CHLORIDE, SODIUM LACTATE, POTASSIUM CHLORIDE, CALCIUM CHLORIDE: 600; 310; 30; 20 INJECTION, SOLUTION INTRAVENOUS at 12:39:00

## 2025-08-20 RX ADMIN — LIDOCAINE HYDROCHLORIDE 50 MG: 10 INJECTION, SOLUTION EPIDURAL; INFILTRATION; INTRACAUDAL; PERINEURAL at 12:36:00

## (undated) DIAGNOSIS — I10 ESSENTIAL HYPERTENSION: ICD-10-CM

## (undated) DIAGNOSIS — E03.9 ACQUIRED HYPOTHYROIDISM: Primary | ICD-10-CM

## (undated) DEVICE — Device

## (undated) DEVICE — KIT CUSTOM ENDOPROCEDURE STERIS

## (undated) DEVICE — SUTURE ETHILON 2-0 FS

## (undated) DEVICE — DRAIN CHANNEL 19FR BLAKE

## (undated) DEVICE — VIOLET BRAIDED (POLYGLACTIN 910), SYNTHETIC ABSORBABLE SUTURE: Brand: COATED VICRYL

## (undated) DEVICE — SUTURE PROLENE 0 CT-1

## (undated) DEVICE — DRAIN RELIAVAC 100CC

## (undated) DEVICE — PROXIMATE SKIN STAPLERS (35 WIDE) CONTAINS 35 STAINLESS STEEL STAPLES (FIXED HEAD): Brand: PROXIMATE

## (undated) DEVICE — GLOVE BIOGEL M SURG SZ 71/2

## (undated) DEVICE — SUTURE ETHIBOND EXCEL 1-0

## (undated) DEVICE — LAPAROTOMY CDS: Brand: MEDLINE INDUSTRIES, INC.

## (undated) DEVICE — ELECTRODE MONIT RED DOT 3PK

## (undated) DEVICE — SUTURE VICRYL 2-0

## (undated) DEVICE — CANNULA NSL AD W/ FLTR 14FT O2/CO2

## (undated) DEVICE — SOL  .9 1000ML BTL

## (undated) DEVICE — KIT VLV 5 PC AIR H2O SUCT BX ENDOGATOR CONN

## (undated) DEVICE — KIT MFLD FOR SPEC COLL

## (undated) DEVICE — KENDALL SCD EXPRESS SLEEVES, KNEE LENGTH, MEDIUM: Brand: KENDALL SCD

## (undated) NOTE — LETTER
07/21/21        Soheila [address] [facility]      Dear Clearance Vy,    [facility] records indicate that you have outstanding lab work and or testing that was ordered for you and has not yet been completed:  Orders Placed This Encounter

## (undated) NOTE — LETTER
[address] [facility]  [address], [facility] [address]  [facility]  [phone]          6/18/2020    Re: Frida Snowden    To Whom It May Concern:     Ms. Aury Arguello is a patient of mine and was seen for a routine physical

## (undated) NOTE — ED AVS SNAPSHOT
THE [facility] Emergency Department in [address] [facility]    Phone:  [phone]    Fax:  [phone]           Chyaa Walker   MRN: [id number]    Department:  THE [facility] Emergency Department in [place]   Date of Visit: Pediatric [phone] Emergency Department   [phone]       To Check ER Wait Times:  TEXT 'ERwait' to 85189      Click [email or web address]      Or call [phone]    If you have any problems with your follow-up, please call our case [facility] before you leave. After you leave, you should follow the attached instructions. I have read and understand the instructions given to me by my caregivers. 24-Hour Pharmacies        Pharmacy Address Phone Number   [phone]  [address]. [employer] will allow you to access patient instructions from your recent visit,  view other health information, and more. To sign up or find more information, go to [email or web address] and click on the Sign Up Now link in the [employer] box.      Enter

## (undated) NOTE — Clinical Note
I had the pleasure of seeing Mert Lunao on 7/27/2017. Please see my attached note.   Rachell Bonds MD FACS EMG--Surgery

## (undated) NOTE — LETTER
02/19/25    To Whom It May Concern,    The above patient is under my care and has completed Lipid Panel blood work on 7/8/2023 and 7/6/2024 at [facility].    Please call our office at [phone] with any questions.      Regards,      Hope Yao MD      Re: Soheila Morley MRN: #[id number]

## (undated) NOTE — LETTER
[facility], [address]  [facility] [id number] [phone]          To Whom It May Concern,     Soheila Morley is a patient of mine at [facility]. She was seen on 7/15/24 for her annual physical.     Sincerely,        Hope Yao M.D.

## (undated) NOTE — LETTER
20    Patient: Kenyon Bill  : 1970 Visit date: 2020    Dear  Dr. Jose Luis Medina MD,    Thank you for referring Kenyon Bill to my practice. Please find my assessment and plan below.              Assessment   Anal polyp  (primary en She does state that she has had a nodule on her rectum that has been there for many years but has never caused her any issues. The patient has had a colonoscopy greater than 10 years ago secondary to [place] [facility]     The patient has nev any fissures, or other etiologies for her pain. If there are we may proceed with treatment at that time. However, we are only planning for a transanal excision of her anal canal polyp at this time.     The patient will also subsequently require a colonosc

## (undated) NOTE — LETTER
Date: 7/16/2025    Patient Name: Soheila Morley          To Whom it May Concern:    This letter has been written at the patient's request. The above patient was seen at [facility] for her yearly annual physical today, July 16th, 2025.      Sincerely,     Hope Yao MD

## (undated) NOTE — LETTER
Date: 7/17/2023    Patient Name: Concha Rick          To Whom it May Concern: This letter has been written at the patient's request. The above patient was seen at the [facility] for her annual physical on 7/12/2023.       Sincerely,       Princess Luz Marina MD

## (undated) NOTE — ED AVS SNAPSHOT
THE [facility] Emergency Department in [address] [facility]    Phone:  [phone]    Fax:  [phone]           Anitha Quintana   MRN: [id number]    Department:  THE [facility] Emergency Department in [place]   Date of Visit: IF THERE IS ANY CHANGE OR WORSENING OF YOUR CONDITION, CALL YOUR PRIMARY CARE PHYSICIAN AT ONCE OR RETURN IMMEDIATELY TO THE EMERGENCY DEPARTMENT.     If you have been prescribed any medication(s), please fill your prescription right away and begin taking t

## (undated) NOTE — LETTER
21    Re: Yanet Sampson   1970    To Whom It May Concern,      This letter is written by request of patient. She had a routine adult physical performed today on . Please call with any questions.      Sincerely,   Dr. Sanjay Roth

## (undated) NOTE — LETTER
[facility], [address]  [facility] [id number] [phone]            2/19/25    Re: Soheila Morley    To Whom It May Concern,      Soheila Morley is a patient of mine at [facility]. She has completed a lipid panel within the past year, on 7/6/24 and previous to that on 7/8/23. Please call with any questions.    Sincerely,      Hope Yao M.D.

## (undated) NOTE — Clinical Note
Hi, patient having 2-3 days of chest tightness substernal radiates to neck and shoulders. I did an ecg that was stable from previous and did order a stress test. I did let her know if it gets worse to contact you or go to ER, thanks!

## (undated) NOTE — LETTER
10/5/2017    Return to Work    Name: Maryam Rehan        : 1970    To Whom It May Concern,    Soheila Chatterjee had surgery on 17 and is:      Able to return to work on 2017. Please allow the patient to walk once hourly.

## (undated) NOTE — LETTER
04/08/19        Soheila [address] [facility] [address]      Dear Jeff Rodríguez,    [facility] records indicate that you have outstanding lab work and or testing that was ordered for you and has not yet been completed: Mammogram - Please contact Hussein

## (undated) NOTE — LETTER
[address]  [address], [facility] [address]  [place] [phone]          2/27/2020    Re; Vega Burroughs    To Whom It May Concern,      My patient, Vega Burroughs, is released to go back to work on [employer]

## (undated) NOTE — LETTER
20    Patient: Anahi Mosqueda  : 1970 Visit date: 2020    Dear  Dr. Poncho Wilson MD,    Thank you for referring Anahi Mosqueda to my practice. Please find my assessment and plan below.           Assessment   Anal polyp  (primary encou mass lesions. This patient will require colonoscopy. We will be doing it within the next few weeks.           Sincerely,       Becky Carter MD   CC: No Recipients

## (undated) NOTE — LETTER
22    Re: Sanjana Calvillo    1970    To Whom It May Concern:     Ms. Som Olguin is a patient of mine at [facility]. She has completed an annual physical on 22. Please call with any questions. Sincerely,     Yoanna Glez.  Robi Ingram M.D.